# Patient Record
Sex: MALE | Race: WHITE | NOT HISPANIC OR LATINO | Employment: OTHER | URBAN - METROPOLITAN AREA
[De-identification: names, ages, dates, MRNs, and addresses within clinical notes are randomized per-mention and may not be internally consistent; named-entity substitution may affect disease eponyms.]

---

## 2017-01-12 ENCOUNTER — GENERIC CONVERSION - ENCOUNTER (OUTPATIENT)
Dept: OTHER | Facility: OTHER | Age: 27
End: 2017-01-12

## 2017-02-02 ENCOUNTER — GENERIC CONVERSION - ENCOUNTER (OUTPATIENT)
Dept: OTHER | Facility: OTHER | Age: 27
End: 2017-02-02

## 2017-05-02 ENCOUNTER — ALLSCRIPTS OFFICE VISIT (OUTPATIENT)
Dept: OTHER | Facility: OTHER | Age: 27
End: 2017-05-02

## 2017-07-03 ENCOUNTER — ALLSCRIPTS OFFICE VISIT (OUTPATIENT)
Dept: OTHER | Facility: OTHER | Age: 27
End: 2017-07-03

## 2017-10-03 ENCOUNTER — ALLSCRIPTS OFFICE VISIT (OUTPATIENT)
Dept: OTHER | Facility: OTHER | Age: 27
End: 2017-10-03

## 2017-10-04 NOTE — PROGRESS NOTES
Assessment  1  Opioid dependence (304 00) (F11 20)   2  Anxiety (300 00) (F41 9)    Plan  Anxiety    · Follow-up visit in 3 months Evaluation and Treatment  Follow-up  Status: Hold For -  Scheduling  Requested for: 25XHV1581  Anxiety, Insomnia    · ALPRAZolam 2 MG Oral Tablet; TAKE 1 TABLET 3 TIMES DAILY AS NEEDED    Discussion/Summary    Continue methadone taper  Chief Complaint  Patient here today for follow up visit  No complaint of pain  Prescription refill needed  History of Present Illness  Patient comes in for recheck  He is now taking 24 mg of methadone per day  He is decreasing his dose by 3 mg every 2 weeks  He is having more withdrawal symptoms  Review of Systems    Constitutional: feeling poorly-and-feeling tired  Cardiovascular: No complaints of slow heart rate, no fast heart rate, no chest pain, no palpitations, no leg claudication, no lower extremity  Respiratory: No complaints of shortness of breath, no wheezing, no cough, no SOB on exertion, no orthopnea or PND  Gastrointestinal: No complaints of abdominal pain, no constipation, no nausea or vomiting, no diarrhea or bloody stools  Active Problems  1  Anxiety (300 00) (F41 9)   2  Constipation (564 00) (K59 00)   3  Insomnia (780 52) (G47 00)   4  Left-sided chest wall pain (786 52) (R07 89)   5  Need for influenza vaccination (V04 81) (Z23)   6  Need for Tdap vaccination (V06 1) (Z23)   7  Opioid dependence (304 00) (F11 20)   8  Prolonged QT interval (794 31) (R94 31)   9  Rhabdomyolysis (728 88) (M62 82)   10  Tobacco use (305 1) (Z72 0)    Past Medical History    The active problems and past medical history were reviewed and updated today  Family History  Mother    1  Denied: Family history of mental disorder   2  Denied: Family history of substance abuse   3  No pertinent family history  Father    4   No pertinent family history    Social History   · Always uses seat belt   · Current every day smoker (305 1) (F17 200)   · Does not use illicit drugs (J18 57) (Z78 9)   · Exercises regularly   · Full-time employment   · Lives with family   · Single   · Tobacco use (305 1) (Z72 0)    Current Meds   1  ALPRAZolam 2 MG Oral Tablet; TAKE 1 TABLET 3 TIMES DAILY AS NEEDED; Therapy: 29Apr2016 to (Evaluate:01Oct2017); Last Rx:[id number] Ordered   2  Methadone HCl - 10 MG Oral Tablet; 5 daily; Therapy: 41Zcv9716 to (Last Rx:[id number]) Ordered    The medication list was reviewed and updated today  Allergies  1  No Known Drug Allergies    Vitals  Vital Signs    Recorded: 18WQE6438 04:34PM   Heart Rate 72   Systolic 460   Diastolic 86   Height 6 ft    Weight 217 lb 6 oz   BMI Calculated 29 48   BSA Calculated 2 21   O2 Saturation 99     Physical Exam    Constitutional   General appearance: No acute distress, well appearing and well nourished  Eyes   Conjunctiva and lids: No swelling, erythema, or discharge  Pupils and irises: Equal, round and reactive to light  Ears, Nose, Mouth, and Throat   External inspection of ears and nose: Normal     Oropharynx: Normal with no erythema, edema, exudate or lesions  Pulmonary   Respiratory effort: No increased work of breathing or signs of respiratory distress  Auscultation of lungs: Clear to auscultation, equal breath sounds bilaterally, no wheezes, no rales, no rhonci  Cardiovascular   Auscultation of heart: Normal rate and rhythm, normal S1 and S2, without murmurs  Abdomen   Abdomen: Non-tender, no masses  Liver and spleen: No hepatomegaly or splenomegaly  Musculoskeletal   Gait and station: Normal     Digits and nails: Normal without clubbing or cyanosis      Inspection/palpation of joints, bones, and muscles: Normal     Psychiatric   Orientation to person, place and time: Normal     Mood and affect: Normal          Signatures   Electronically signed by : GODFREY Richards ; Oct  3 2017  5:04PM EST                       (Author)

## 2017-10-31 ENCOUNTER — GENERIC CONVERSION - ENCOUNTER (OUTPATIENT)
Dept: OTHER | Facility: OTHER | Age: 27
End: 2017-10-31

## 2017-11-07 ENCOUNTER — GENERIC CONVERSION - ENCOUNTER (OUTPATIENT)
Dept: OTHER | Facility: OTHER | Age: 27
End: 2017-11-07

## 2017-11-30 ENCOUNTER — GENERIC CONVERSION - ENCOUNTER (OUTPATIENT)
Dept: OTHER | Facility: OTHER | Age: 27
End: 2017-11-30

## 2018-01-05 ENCOUNTER — GENERIC CONVERSION - ENCOUNTER (OUTPATIENT)
Dept: OTHER | Facility: OTHER | Age: 28
End: 2018-01-05

## 2018-01-14 VITALS
OXYGEN SATURATION: 98 % | HEART RATE: 59 BPM | WEIGHT: 217 LBS | BODY MASS INDEX: 29.39 KG/M2 | SYSTOLIC BLOOD PRESSURE: 122 MMHG | HEIGHT: 72 IN | DIASTOLIC BLOOD PRESSURE: 68 MMHG

## 2018-01-15 VITALS
HEART RATE: 72 BPM | OXYGEN SATURATION: 99 % | SYSTOLIC BLOOD PRESSURE: 134 MMHG | HEIGHT: 72 IN | WEIGHT: 217.38 LBS | DIASTOLIC BLOOD PRESSURE: 86 MMHG | BODY MASS INDEX: 29.44 KG/M2

## 2018-01-17 NOTE — PROGRESS NOTES
Assessment    1  Encounter for preventive health examination (V70 0) (Z00 00)   2  Opioid dependence (304 00) (F11 20)   3  Prolonged QT interval (794 31) (R94 31)   4  Tobacco use (305 1) (Z72 0)    Plan  Anxiety, Insomnia    · ALPRAZolam 2 MG Oral Tablet; TAKE 1 TABLET 3 TIMES DAILY AS NEEDED  Health Maintenance    · Follow-up visit in 3 months Evaluation and Treatment  Follow-up  Status: Complete   Done: 51UQH7733  Need for influenza vaccination    · Influenza  Opioid dependence    · From  Methadone HCl - 10 MG Oral Tablet 7 daily To Methadone HCl - 10 MG  Oral Tablet 5 daily    Discussion/Summary  Impression: health maintenance visit  Currently, he eats a healthy diet  Prostate cancer screening: PSA is not indicated  Testicular cancer screening: the risks and benefits of testicular cancer screening were discussed  Colorectal cancer screening: colorectal cancer screening is not indicated  The immunizations are up to date  He will continue to follow-up at his methadone clinic  Chief Complaint  Pt  in office today for a check up  History of Present Illness  HM, Adult Male: The patient is being seen for a health maintenance evaluation  Social History: He is unmarried  Work status: working full time  The patient is a current cigarette smoker  He is not ready to quit using tobacco  He reports occasional alcohol use  He Patient on methadone maintenance program    General Health: The patient's health since the last visit is described as good  Lifestyle:  He uses tobacco  He denies drug use  Screening:   HPI: Patient comes in for a recheck  Continues to decrease his methadone  He is now using 50 mg per day  He is decreasing by 5 mg every 2 weeks  Review of Systems    Constitutional: No fever or chills, feels well, no tiredness, no recent weight gain or weight loss     Cardiovascular: No complaints of slow heart rate, no fast heart rate, no chest pain, no palpitations, no leg claudication, no lower extremity  Respiratory: No complaints of shortness of breath, no wheezing, no cough, no SOB on exertion, no orthopnea or PND  Active Problems    1  Anxiety (300 00) (F41 9)   2  Constipation (564 00) (K59 00)   3  Insomnia (780 52) (G47 00)   4  Left-sided chest wall pain (786 52) (R07 89)   5  Need for influenza vaccination (V04 81) (Z23)   6  Need for Tdap vaccination (V06 1) (Z23)   7  Opioid dependence (304 00) (F11 20)   8  Prolonged QT interval (794 31) (R94 31)   9  Rhabdomyolysis (728 88) (M62 82)   10  Tobacco use (305 1) (Z72 0)    Family History  Mother    · Denied: Family history of mental disorder   · Denied: Family history of substance abuse   · No pertinent family history  Father    · No pertinent family history    Social History    · Always uses seat belt   · Current every day smoker (305 1) (F17 200)   · Does not use illicit drugs (M95 90) (Z78 9)   · Exercises regularly   · Full-time employment   · Lives with family   · Single   · Tobacco use (305 1) (Z72 0)    Current Meds   1  ALPRAZolam 2 MG Oral Tablet; TAKE 1 TABLET 3 TIMES DAILY AS NEEDED; Therapy: 98Dmw8025 to (Evaluate:47Xqm7827); Last AW:[id number] Ordered   2  Methadone HCl - 10 MG Oral Tablet; 7 daily; Therapy: 68Pau6673 to (Last Rx:[id number]) Ordered    Allergies    1  No Known Drug Allergies    Vitals   Recorded: 88HJY9204 05:45PM   Heart Rate 57   Systolic 148   Diastolic 68   Height 6 ft    Weight 211 lb    BMI Calculated 28 62   BSA Calculated 2 18   O2 Saturation 97     Physical Exam    Constitutional   General appearance: No acute distress, well appearing and well nourished  Eyes   Conjunctiva and lids: No swelling, erythema, or discharge  Pupils and irises: Equal, round and reactive to light  Ears, Nose, Mouth, and Throat   External inspection of ears and nose: Normal     Oropharynx: Normal with no erythema, edema, exudate or lesions      Pulmonary   Respiratory effort: No increased work of breathing or signs of respiratory distress  Auscultation of lungs: Clear to auscultation  Cardiovascular   Palpation of heart: Normal PMI, no thrills  Auscultation of heart: Normal rate and rhythm, normal S1 and S2, without murmurs  Examination of extremities for edema and/or varicosities: Normal     Abdomen   Abdomen: Non-tender, no masses  Liver and spleen: No hepatomegaly or splenomegaly  Lymphatic   Palpation of lymph nodes in neck: No lymphadenopathy  Musculoskeletal   Gait and station: Normal     Digits and nails: Normal without clubbing or cyanosis  Inspection/palpation of joints, bones, and muscles: Normal     Skin   Skin and subcutaneous tissue: Normal without rashes or lesions  Neurologic   Cranial nerves: Cranial nerves 2-12 intact  Reflexes: 2+ and symmetric  Sensation: No sensory loss      Psychiatric   Orientation to person, place and time: Normal     Mood and affect: Normal        Signatures   Electronically signed by : GODFREY Farris ; Jul  3 2017  6:05PM EST                       (Author)

## 2018-01-22 VITALS
HEIGHT: 72 IN | BODY MASS INDEX: 29.26 KG/M2 | OXYGEN SATURATION: 97 % | WEIGHT: 216 LBS | SYSTOLIC BLOOD PRESSURE: 130 MMHG | HEART RATE: 85 BPM | DIASTOLIC BLOOD PRESSURE: 80 MMHG

## 2018-01-22 VITALS
DIASTOLIC BLOOD PRESSURE: 68 MMHG | OXYGEN SATURATION: 97 % | WEIGHT: 211 LBS | SYSTOLIC BLOOD PRESSURE: 110 MMHG | HEART RATE: 57 BPM | BODY MASS INDEX: 28.58 KG/M2 | HEIGHT: 72 IN

## 2018-01-22 VITALS
HEIGHT: 72 IN | SYSTOLIC BLOOD PRESSURE: 122 MMHG | HEART RATE: 86 BPM | BODY MASS INDEX: 28.71 KG/M2 | DIASTOLIC BLOOD PRESSURE: 80 MMHG | OXYGEN SATURATION: 98 % | WEIGHT: 212 LBS

## 2018-01-22 VITALS
WEIGHT: 213 LBS | SYSTOLIC BLOOD PRESSURE: 130 MMHG | DIASTOLIC BLOOD PRESSURE: 78 MMHG | OXYGEN SATURATION: 97 % | HEART RATE: 93 BPM | BODY MASS INDEX: 28.85 KG/M2 | HEIGHT: 72 IN

## 2018-01-22 VITALS
HEIGHT: 72 IN | SYSTOLIC BLOOD PRESSURE: 132 MMHG | DIASTOLIC BLOOD PRESSURE: 70 MMHG | HEART RATE: 89 BPM | OXYGEN SATURATION: 98 % | WEIGHT: 205 LBS | BODY MASS INDEX: 27.77 KG/M2

## 2018-01-24 VITALS
SYSTOLIC BLOOD PRESSURE: 122 MMHG | OXYGEN SATURATION: 98 % | TEMPERATURE: 100.4 F | WEIGHT: 212.13 LBS | HEIGHT: 72 IN | HEART RATE: 106 BPM | RESPIRATION RATE: 16 BRPM | DIASTOLIC BLOOD PRESSURE: 88 MMHG | BODY MASS INDEX: 28.73 KG/M2

## 2018-02-01 RX ORDER — METHADONE HYDROCHLORIDE 5 MG/1
1 TABLET ORAL DAILY
COMMUNITY
Start: 2015-12-23 | End: 2018-02-02

## 2018-02-01 RX ORDER — HYDROXYZINE 50 MG/1
1-2 TABLET, FILM COATED ORAL AS NEEDED
COMMUNITY
Start: 2017-10-31 | End: 2018-05-03 | Stop reason: SDUPTHER

## 2018-02-01 RX ORDER — ALPRAZOLAM 2 MG/1
2 TABLET ORAL 2 TIMES DAILY
COMMUNITY
Start: 2016-04-29 | End: 2018-02-02 | Stop reason: SDUPTHER

## 2018-02-02 ENCOUNTER — OFFICE VISIT (OUTPATIENT)
Dept: FAMILY MEDICINE CLINIC | Facility: CLINIC | Age: 28
End: 2018-02-02
Payer: COMMERCIAL

## 2018-02-02 VITALS
HEART RATE: 87 BPM | TEMPERATURE: 100.1 F | HEIGHT: 73 IN | OXYGEN SATURATION: 98 % | SYSTOLIC BLOOD PRESSURE: 142 MMHG | DIASTOLIC BLOOD PRESSURE: 84 MMHG | WEIGHT: 209 LBS | RESPIRATION RATE: 17 BRPM | BODY MASS INDEX: 27.7 KG/M2

## 2018-02-02 DIAGNOSIS — F11.23 NARCOTIC WITHDRAWAL (HCC): ICD-10-CM

## 2018-02-02 DIAGNOSIS — F41.9 ANXIETY: Primary | ICD-10-CM

## 2018-02-02 PROBLEM — F11.93 NARCOTIC WITHDRAWAL (HCC): Status: ACTIVE | Noted: 2018-02-02

## 2018-02-02 PROCEDURE — 99213 OFFICE O/P EST LOW 20 MIN: CPT | Performed by: FAMILY MEDICINE

## 2018-02-02 RX ORDER — ALPRAZOLAM 2 MG/1
2 TABLET ORAL 3 TIMES DAILY
Qty: 180 TABLET | Refills: 0 | Status: SHIPPED | OUTPATIENT
Start: 2018-02-02 | End: 2018-02-02 | Stop reason: SDUPTHER

## 2018-02-02 RX ORDER — ALPRAZOLAM 2 MG/1
4 TABLET ORAL 3 TIMES DAILY
Qty: 180 TABLET | Refills: 0 | Status: SHIPPED | OUTPATIENT
Start: 2018-02-02 | End: 2018-03-05 | Stop reason: SDUPTHER

## 2018-02-02 NOTE — PATIENT INSTRUCTIONS
Once his withdrawal symptoms have improved he will attempt to wean off Xanax  He will call for refills until his follow-up appointment

## 2018-02-02 NOTE — PROGRESS NOTES
Assessment/Plan:           Problem List Items Addressed This Visit     Anxiety - Primary    Relevant Medications    ALPRAZolam (XANAX) 2 MG tablet    Narcotic withdrawal (HCC)    Relevant Medications    hydrOXYzine HCL (ATARAX) 50 mg tablet    ALPRAZolam Forestine Guitar) 2 MG tablet            Subjective:      Patient ID: Manuela Avila is a 24 y o  male  Patient comes in for recheck  He took his last dose of methadone yesterday and is in full-blown withdrawal with anxiety, jitteriness, fever and chills  The following portions of the patient's history were reviewed and updated as appropriate: allergies, current medications, past family history, past medical history, past social history, past surgical history and problem list     Review of Systems   Constitutional: Positive for chills, diaphoresis and fatigue  HENT: Negative  Respiratory: Negative  Cardiovascular: Negative  Objective:     Physical Exam   Constitutional: He is oriented to person, place, and time  He appears well-developed and well-nourished  HENT:   Head: Normocephalic and atraumatic  Right Ear: Tympanic membrane normal    Left Ear: Tympanic membrane normal    Eyes: EOM are normal  Pupils are equal, round, and reactive to light  Neck: Neck supple  Cardiovascular: Normal rate, regular rhythm and normal heart sounds  Pulmonary/Chest: Effort normal and breath sounds normal    Abdominal: Soft  Bowel sounds are normal    Musculoskeletal: Normal range of motion  Neurological: He is alert and oriented to person, place, and time  Skin: Skin is warm and dry  Psychiatric: He has a normal mood and affect  Thought content normal    Vitals reviewed

## 2018-03-05 ENCOUNTER — OFFICE VISIT (OUTPATIENT)
Dept: FAMILY MEDICINE CLINIC | Facility: CLINIC | Age: 28
End: 2018-03-05
Payer: COMMERCIAL

## 2018-03-05 VITALS
HEIGHT: 73 IN | TEMPERATURE: 98.6 F | SYSTOLIC BLOOD PRESSURE: 124 MMHG | WEIGHT: 197 LBS | DIASTOLIC BLOOD PRESSURE: 82 MMHG | BODY MASS INDEX: 26.11 KG/M2 | HEART RATE: 110 BPM | OXYGEN SATURATION: 99 %

## 2018-03-05 DIAGNOSIS — F41.9 ANXIETY: Primary | ICD-10-CM

## 2018-03-05 DIAGNOSIS — G47.00 INSOMNIA, UNSPECIFIED TYPE: ICD-10-CM

## 2018-03-05 PROCEDURE — 99213 OFFICE O/P EST LOW 20 MIN: CPT | Performed by: FAMILY MEDICINE

## 2018-03-05 RX ORDER — ALPRAZOLAM 2 MG/1
4 TABLET ORAL 3 TIMES DAILY
Qty: 180 TABLET | Refills: 0 | Status: SHIPPED | OUTPATIENT
Start: 2018-03-05 | End: 2018-04-03 | Stop reason: SDUPTHER

## 2018-03-05 NOTE — PROGRESS NOTES
Assessment/Plan:           Problem List Items Addressed This Visit     None            Subjective:      Patient ID: Omega Neil is a 24 y o  male  Patient comes in for recheck  He still having problems with anxiety and insomnia despite the amount of medication he is taking        The following portions of the patient's history were reviewed and updated as appropriate: allergies, current medications, past family history, past medical history, past social history, past surgical history and problem list     Review of Systems   HENT: Negative  Respiratory: Negative  Cardiovascular: Negative  Gastrointestinal: Negative  Objective:      /82   Pulse (!) 110   Temp 98 6 °F (37 °C)   Ht 6' 1" (1 854 m)   Wt 89 4 kg (197 lb)   SpO2 99%   BMI 25 99 kg/m²          Physical Exam   Skin: Skin is warm and dry  Psychiatric: He has a normal mood and affect   His behavior is normal  Thought content normal

## 2018-04-03 ENCOUNTER — OFFICE VISIT (OUTPATIENT)
Dept: FAMILY MEDICINE CLINIC | Facility: CLINIC | Age: 28
End: 2018-04-03
Payer: COMMERCIAL

## 2018-04-03 VITALS
DIASTOLIC BLOOD PRESSURE: 90 MMHG | WEIGHT: 184.4 LBS | HEIGHT: 73 IN | HEART RATE: 88 BPM | OXYGEN SATURATION: 98 % | BODY MASS INDEX: 24.44 KG/M2 | TEMPERATURE: 98.4 F | SYSTOLIC BLOOD PRESSURE: 130 MMHG

## 2018-04-03 DIAGNOSIS — F41.9 ANXIETY: Primary | ICD-10-CM

## 2018-04-03 DIAGNOSIS — G47.00 INSOMNIA, UNSPECIFIED TYPE: ICD-10-CM

## 2018-04-03 PROCEDURE — 99213 OFFICE O/P EST LOW 20 MIN: CPT | Performed by: FAMILY MEDICINE

## 2018-04-03 RX ORDER — ALPRAZOLAM 2 MG/1
4 TABLET ORAL 3 TIMES DAILY
Qty: 180 TABLET | Refills: 0 | Status: SHIPPED | OUTPATIENT
Start: 2018-04-03 | End: 2018-05-03 | Stop reason: SDUPTHER

## 2018-04-03 NOTE — PROGRESS NOTES
Assessment/Plan:           Problem List Items Addressed This Visit     Anxiety - Primary    Relevant Medications    ALPRAZolam (XANAX) 2 MG tablet    Insomnia            Subjective:      Patient ID: Santo Feliz is a 34 y o  male  Patient comes in for a recheck  He continues to require the same amount of Xanax  He is able to work 4-5 hours per day  But continues to have problems sleeping  He is in counseling once per week  Medication Refill         The following portions of the patient's history were reviewed and updated as appropriate: allergies, current medications, past family history, past medical history, past social history, past surgical history and problem list     Review of Systems   Constitutional: Negative  HENT: Negative  Respiratory: Negative  Cardiovascular: Negative  Objective:      /90   Pulse 88   Temp 98 4 °F (36 9 °C)   Ht 6' 1" (1 854 m)   Wt 83 6 kg (184 lb 6 4 oz)   SpO2 98%   BMI 24 33 kg/m²          Physical Exam   Psychiatric: He has a normal mood and affect   His behavior is normal  Judgment and thought content normal

## 2018-04-03 NOTE — PROGRESS NOTES
Assessment/Plan:           Problem List Items Addressed This Visit     Anxiety - Primary    Insomnia            Subjective:      Patient ID: Wagner Simons is a 34 y o  male      HPI    The following portions of the patient's history were reviewed and updated as appropriate: allergies, current medications, past family history, past medical history, past social history, past surgical history and problem list     Review of Systems      Objective:      /90   Pulse 88   Temp 98 4 °F (36 9 °C)   Ht 6' 1" (1 854 m)   Wt 83 6 kg (184 lb 6 4 oz)   SpO2 98%   BMI 24 33 kg/m²          Physical Exam

## 2018-04-03 NOTE — PATIENT INSTRUCTIONS
We discussed his weaning of Xanax and using hydroxyzine to help him sleep or further anxiety problems  He agrees with this plan but does not feel ready to do this at this point

## 2018-04-09 DIAGNOSIS — F41.9 ANXIETY: ICD-10-CM

## 2018-04-10 RX ORDER — ALPRAZOLAM 2 MG/1
TABLET ORAL
Qty: 180 TABLET | Refills: 0 | OUTPATIENT
Start: 2018-04-10

## 2018-05-03 ENCOUNTER — OFFICE VISIT (OUTPATIENT)
Dept: FAMILY MEDICINE CLINIC | Facility: CLINIC | Age: 28
End: 2018-05-03
Payer: COMMERCIAL

## 2018-05-03 ENCOUNTER — TELEPHONE (OUTPATIENT)
Dept: FAMILY MEDICINE CLINIC | Facility: CLINIC | Age: 28
End: 2018-05-03

## 2018-05-03 VITALS
HEIGHT: 73 IN | HEART RATE: 96 BPM | TEMPERATURE: 99.1 F | OXYGEN SATURATION: 99 % | WEIGHT: 177 LBS | DIASTOLIC BLOOD PRESSURE: 72 MMHG | BODY MASS INDEX: 23.46 KG/M2 | SYSTOLIC BLOOD PRESSURE: 126 MMHG

## 2018-05-03 DIAGNOSIS — R60.9 EDEMA, UNSPECIFIED TYPE: ICD-10-CM

## 2018-05-03 DIAGNOSIS — F11.29 OPIOID DEPENDENCE WITH OPIOID-INDUCED DISORDER (HCC): ICD-10-CM

## 2018-05-03 DIAGNOSIS — G47.00 INSOMNIA, UNSPECIFIED TYPE: Primary | ICD-10-CM

## 2018-05-03 DIAGNOSIS — R63.4 WEIGHT LOSS: ICD-10-CM

## 2018-05-03 DIAGNOSIS — M25.50 ARTHRALGIA, UNSPECIFIED JOINT: ICD-10-CM

## 2018-05-03 DIAGNOSIS — F41.9 ANXIETY: ICD-10-CM

## 2018-05-03 PROBLEM — F11.93 NARCOTIC WITHDRAWAL (HCC): Status: RESOLVED | Noted: 2018-02-02 | Resolved: 2018-05-03

## 2018-05-03 PROBLEM — F11.23 NARCOTIC WITHDRAWAL (HCC): Status: RESOLVED | Noted: 2018-02-02 | Resolved: 2018-05-03

## 2018-05-03 PROCEDURE — 99214 OFFICE O/P EST MOD 30 MIN: CPT | Performed by: FAMILY MEDICINE

## 2018-05-03 RX ORDER — HYDROXYZINE 50 MG/1
50-100 TABLET, FILM COATED ORAL EVERY 4 HOURS PRN
Qty: 60 TABLET | Refills: 3 | Status: SHIPPED | OUTPATIENT
Start: 2018-05-03 | End: 2018-09-04 | Stop reason: SDUPTHER

## 2018-05-03 RX ORDER — ALPRAZOLAM 2 MG/1
4 TABLET ORAL 3 TIMES DAILY
Qty: 180 TABLET | Refills: 0 | Status: SHIPPED | OUTPATIENT
Start: 2018-05-03 | End: 2018-06-04 | Stop reason: SDUPTHER

## 2018-05-03 NOTE — PROGRESS NOTES
Assessment/Plan:           Problem List Items Addressed This Visit     Anxiety    Relevant Medications    ALPRAZolam (XANAX) 2 MG tablet    hydrOXYzine HCL (ATARAX) 50 mg tablet    Insomnia - Primary    Relevant Medications    hydrOXYzine HCL (ATARAX) 50 mg tablet    Opioid dependence (HCC)    Edema    Relevant Orders    CBC and differential    Comprehensive metabolic panel    Urinalysis with reflex to microscopic    Arthralgia    Relevant Orders    MISAEL Screen w/ Reflex to Titer/Pattern    Lyme Antibody Profile with reflex to WB    Uric acid    Sedimentation rate, automated    RF Screen w/ Reflex to Titer    TSH, 3rd generation with T4 reflex    Myoglobin, urine    Urinalysis with reflex to microscopic    Weight loss            Subjective:      Patient ID: Curtistine Fleischer is a 34 y o  male  Patient comes in for recheck  He is still having great deal problems with anxiety and insomnia  He is now noting joint aches and pains especially in his ankles and edema of his feet  He has resume working full-time  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  The following portions of the patient's history were reviewed and updated as appropriate: allergies, current medications, past family history, past medical history, past social history, past surgical history and problem list     Review of Systems   Constitutional: Positive for diaphoresis and fatigue  HENT: Negative  Respiratory: Negative  Cardiovascular: Negative  Gastrointestinal: Negative  Objective:      /72   Pulse 96   Temp 99 1 °F (37 3 °C)   Ht 6' 1" (1 854 m)   Wt 80 3 kg (177 lb)   SpO2 99%   BMI 23 35 kg/m²          Physical Exam   Constitutional: He is oriented to person, place, and time  He appears well-developed and well-nourished  HENT:   Head: Normocephalic and atraumatic     Right Ear: Tympanic membrane normal    Left Ear: Tympanic membrane normal    Eyes: EOM are normal  Pupils are equal, round, and reactive to light  Neck: Neck supple  Cardiovascular: Normal rate, regular rhythm and normal heart sounds  Pulmonary/Chest: Effort normal and breath sounds normal    Abdominal: Soft  Bowel sounds are normal    Musculoskeletal: Normal range of motion  Neurological: He is alert and oriented to person, place, and time  Skin: Skin is warm and dry  Psychiatric: He has a normal mood and affect   Thought content normal

## 2018-05-03 NOTE — TELEPHONE ENCOUNTER
Pharmacy called and the Alprazolam isn't covered at 6 a day - spoke with BD and he said that the patient is aware of that and he pays out of pocket

## 2018-06-04 ENCOUNTER — OFFICE VISIT (OUTPATIENT)
Dept: FAMILY MEDICINE CLINIC | Facility: CLINIC | Age: 28
End: 2018-06-04
Payer: COMMERCIAL

## 2018-06-04 VITALS
TEMPERATURE: 98.4 F | OXYGEN SATURATION: 98 % | WEIGHT: 172 LBS | HEART RATE: 82 BPM | DIASTOLIC BLOOD PRESSURE: 70 MMHG | HEIGHT: 73 IN | BODY MASS INDEX: 22.8 KG/M2 | SYSTOLIC BLOOD PRESSURE: 104 MMHG

## 2018-06-04 DIAGNOSIS — G47.00 INSOMNIA, UNSPECIFIED TYPE: ICD-10-CM

## 2018-06-04 DIAGNOSIS — R63.4 WEIGHT LOSS: ICD-10-CM

## 2018-06-04 DIAGNOSIS — M77.51 TENDINITIS OF RIGHT ANKLE: ICD-10-CM

## 2018-06-04 DIAGNOSIS — F41.9 ANXIETY: Primary | ICD-10-CM

## 2018-06-04 PROCEDURE — 99214 OFFICE O/P EST MOD 30 MIN: CPT | Performed by: FAMILY MEDICINE

## 2018-06-04 RX ORDER — ALPRAZOLAM 2 MG/1
4 TABLET ORAL 3 TIMES DAILY
Qty: 180 TABLET | Refills: 0 | Status: SHIPPED | OUTPATIENT
Start: 2018-06-04 | End: 2018-07-03 | Stop reason: SDUPTHER

## 2018-06-04 NOTE — PROGRESS NOTES
Assessment/Plan:           Problem List Items Addressed This Visit     Anxiety - Primary     He will attempt to wean back on his Xanax  Relevant Medications    ALPRAZolam (XANAX) 2 MG tablet    Insomnia    Weight loss     Although his recent blood work is normal he has a TSH that is on the low side  I recommend a full thyroid profile for his weight loss and possibly CT of chest abdomen pelvis  Tendinitis of right ankle     Air cast is recommended for time being  He has appointment with Orthopedics in 3 days  Subjective:      Patient ID: Francine Ferreira is a 34 y o  male  Patient comes in for recheck  He still is having problems with anxiety and insomnia  He continues to lose weight  Complains of right ankle pain  The following portions of the patient's history were reviewed and updated as appropriate: allergies, current medications, past family history, past medical history, past social history, past surgical history and problem list     Review of Systems   Constitutional: Negative  HENT: Negative  Respiratory: Negative  Cardiovascular: Negative  Objective:      /70   Pulse 82   Temp 98 4 °F (36 9 °C)   Ht 6' 1" (1 854 m)   Wt 78 kg (172 lb)   SpO2 98%   BMI 22 69 kg/m²          Physical Exam   Constitutional: He is oriented to person, place, and time  He appears well-developed and well-nourished  HENT:   Head: Normocephalic and atraumatic  Right Ear: Tympanic membrane normal    Left Ear: Tympanic membrane normal    Eyes: EOM are normal  Pupils are equal, round, and reactive to light  Neck: Neck supple  Cardiovascular: Normal rate, regular rhythm and normal heart sounds  Pulmonary/Chest: Effort normal and breath sounds normal    Abdominal: Soft  Bowel sounds are normal    Musculoskeletal: Normal range of motion  Tender anterior posterior right ankle  Pain with flexion and extension of foot     Neurological: He is alert and oriented to person, place, and time  Skin: Skin is warm and dry  Psychiatric: He has a normal mood and affect   Thought content normal

## 2018-06-05 ENCOUNTER — TELEPHONE (OUTPATIENT)
Dept: FAMILY MEDICINE CLINIC | Facility: CLINIC | Age: 28
End: 2018-06-05

## 2018-06-05 NOTE — TELEPHONE ENCOUNTER
Xanax 2 mg 2 tab tid   Insurance allows 5 daily without prior auth  Call for prior auth if needed @ [phone]  ID# [id number]    Decrease amount or Prior Auth?     Call pharmacy to inform

## 2018-06-22 ENCOUNTER — TELEPHONE (OUTPATIENT)
Dept: FAMILY MEDICINE CLINIC | Facility: CLINIC | Age: 28
End: 2018-06-22

## 2018-07-03 ENCOUNTER — OFFICE VISIT (OUTPATIENT)
Dept: FAMILY MEDICINE CLINIC | Facility: CLINIC | Age: 28
End: 2018-07-03
Payer: COMMERCIAL

## 2018-07-03 VITALS
BODY MASS INDEX: 23.72 KG/M2 | DIASTOLIC BLOOD PRESSURE: 64 MMHG | OXYGEN SATURATION: 98 % | WEIGHT: 179 LBS | TEMPERATURE: 98.1 F | SYSTOLIC BLOOD PRESSURE: 102 MMHG | HEART RATE: 100 BPM | HEIGHT: 73 IN

## 2018-07-03 DIAGNOSIS — G71.11 MYOTONIC DYSTROPHY (HCC): ICD-10-CM

## 2018-07-03 DIAGNOSIS — M79.671 RIGHT FOOT PAIN: Primary | ICD-10-CM

## 2018-07-03 DIAGNOSIS — F41.9 ANXIETY: ICD-10-CM

## 2018-07-03 PROCEDURE — 99214 OFFICE O/P EST MOD 30 MIN: CPT | Performed by: FAMILY MEDICINE

## 2018-07-03 RX ORDER — ALPRAZOLAM 2 MG/1
4 TABLET ORAL 3 TIMES DAILY
Qty: 180 TABLET | Refills: 0 | Status: SHIPPED | OUTPATIENT
Start: 2018-07-03 | End: 2018-08-06 | Stop reason: SDUPTHER

## 2018-07-03 NOTE — TELEPHONE ENCOUNTER
Rejection from pharmacy not received via fax with all the pertinent information  T/c to patient, l/m stating I need insurance info if prior Michele Cid is required for this rx or for further refills

## 2018-07-03 NOTE — PROGRESS NOTES
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[address] )     Continue follow-up with Neurology  Subjective:      Patient ID: April Harding is a 34 y o  male  Patient comes in for a recheck  He is now seeing Neurology at [facility] for his right foot pain  He has been diagnosed with a myotonic dystrophy  He continues aches Xanax 4 mg 3 times a day for his anxiety  He is hydroxyzine to help him sleep at night  He had a full cardiology workup while at [facility]  The following portions of the patient's history were reviewed and updated as appropriate: allergies, current medications, past family history, past medical history, past social history, past surgical history and problem list     Review of Systems   Constitutional: Negative  HENT: Negative  Respiratory: Negative  Cardiovascular: Negative  Objective:      /64   Pulse 100   Temp 98 1 °F (36 7 °C)   Ht 6' 1" (1 854 m)   Wt 81 2 kg (179 lb)   SpO2 98%   BMI 23 62 kg/m²          Physical Exam   Constitutional: He is oriented to person, place, and time  He appears well-developed and well-nourished  HENT:   Head: Normocephalic and atraumatic  Right Ear: Tympanic membrane normal    Left Ear: Tympanic membrane normal    Eyes: EOM are normal  Pupils are equal, round, and reactive to light  Neck: Neck supple  Cardiovascular: Normal rate, regular rhythm and normal heart sounds  Pulmonary/Chest: Effort normal and breath sounds normal    Abdominal: Soft   Bowel sounds are normal    Musculoskeletal:   Right foot and ankle are in a splint   Neurological: He is alert and oriented to person, place, and time    Skin: Skin is warm and dry  Psychiatric: He has a normal mood and affect   Thought content normal

## 2018-08-06 ENCOUNTER — TELEPHONE (OUTPATIENT)
Dept: FAMILY MEDICINE CLINIC | Facility: CLINIC | Age: 28
End: 2018-08-06

## 2018-08-06 DIAGNOSIS — F41.9 ANXIETY: ICD-10-CM

## 2018-08-06 RX ORDER — ALPRAZOLAM 2 MG/1
4 TABLET ORAL 3 TIMES DAILY
Qty: 180 TABLET | Refills: 0 | Status: SHIPPED | OUTPATIENT
Start: 2018-08-06 | End: 2018-09-04 | Stop reason: SDUPTHER

## 2018-08-07 NOTE — TELEPHONE ENCOUNTER
T/c to Pharmacy  Prior Shy Gene is required at dose of 6 qd (2 tab TID)  Pharmacist checked with patient and he stated he definitely needs the 6 a day   [facility]: [phone]  ID [id number]    T/c to Yanet Jaimes     Case reference # [id number]    Determination will be received via fax within 72 hours

## 2018-08-08 ENCOUNTER — TRANSCRIBE ORDERS (OUTPATIENT)
Dept: ADMINISTRATIVE | Facility: HOSPITAL | Age: 28
End: 2018-08-08

## 2018-08-08 DIAGNOSIS — G90.521 COMPLEX REGIONAL PAIN SYNDROME TYPE 1 OF RIGHT LOWER EXTREMITY: Primary | ICD-10-CM

## 2018-08-08 DIAGNOSIS — G89.29 CHRONIC PAIN OF RIGHT ANKLE: ICD-10-CM

## 2018-08-08 DIAGNOSIS — M25.571 CHRONIC PAIN OF RIGHT ANKLE: ICD-10-CM

## 2018-08-17 ENCOUNTER — HOSPITAL ENCOUNTER (OUTPATIENT)
Dept: NUCLEAR MEDICINE | Facility: HOSPITAL | Age: 28
Discharge: HOME | End: 2018-08-17
Payer: COMMERCIAL

## 2018-08-17 DIAGNOSIS — M25.571 CHRONIC PAIN OF RIGHT ANKLE: ICD-10-CM

## 2018-08-17 DIAGNOSIS — G90.521 COMPLEX REGIONAL PAIN SYNDROME TYPE 1 OF RIGHT LOWER EXTREMITY: ICD-10-CM

## 2018-08-17 DIAGNOSIS — G89.29 CHRONIC PAIN OF RIGHT ANKLE: ICD-10-CM

## 2018-08-17 PROCEDURE — 78315 BONE IMAGING 3 PHASE: CPT

## 2018-08-17 PROCEDURE — A9503 TC99M MEDRONATE: HCPCS

## 2018-09-04 ENCOUNTER — OFFICE VISIT (OUTPATIENT)
Dept: FAMILY MEDICINE CLINIC | Facility: CLINIC | Age: 28
End: 2018-09-04
Payer: COMMERCIAL

## 2018-09-04 VITALS
DIASTOLIC BLOOD PRESSURE: 78 MMHG | HEART RATE: 78 BPM | WEIGHT: 176 LBS | HEIGHT: 73 IN | OXYGEN SATURATION: 99 % | TEMPERATURE: 97.8 F | SYSTOLIC BLOOD PRESSURE: 122 MMHG | BODY MASS INDEX: 23.33 KG/M2

## 2018-09-04 DIAGNOSIS — G90.521 COMPLEX REGIONAL PAIN SYNDROME TYPE 1 OF RIGHT LOWER EXTREMITY: Primary | ICD-10-CM

## 2018-09-04 DIAGNOSIS — F41.9 ANXIETY: ICD-10-CM

## 2018-09-04 DIAGNOSIS — G47.00 INSOMNIA, UNSPECIFIED TYPE: ICD-10-CM

## 2018-09-04 DIAGNOSIS — G71.11 MYOTONIC DYSTROPHY (HCC): ICD-10-CM

## 2018-09-04 PROCEDURE — 3008F BODY MASS INDEX DOCD: CPT | Performed by: FAMILY MEDICINE

## 2018-09-04 PROCEDURE — 99214 OFFICE O/P EST MOD 30 MIN: CPT | Performed by: FAMILY MEDICINE

## 2018-09-04 RX ORDER — ALPRAZOLAM 2 MG/1
4 TABLET ORAL 3 TIMES DAILY
Qty: 180 TABLET | Refills: 0 | Status: SHIPPED | OUTPATIENT
Start: 2018-09-04 | End: 2018-10-09 | Stop reason: SDUPTHER

## 2018-09-04 RX ORDER — GABAPENTIN 300 MG/1
CAPSULE ORAL
COMMUNITY
Start: 2018-08-27 | End: 2018-12-04

## 2018-09-04 RX ORDER — HYDROXYZINE 50 MG/1
50-100 TABLET, FILM COATED ORAL EVERY 4 HOURS PRN
Qty: 60 TABLET | Refills: 5 | Status: SHIPPED | OUTPATIENT
Start: 2018-09-04 | End: 2018-10-09 | Stop reason: SDUPTHER

## 2018-09-04 NOTE — PROGRESS NOTES
Assessment/Plan:      Return visit in 4 months     Problem List Items Addressed This Visit     Anxiety    Relevant Medications    ALPRAZolam (XANAX) 2 MG tablet    hydrOXYzine HCL (ATARAX) 50 mg tablet    Insomnia    Relevant Medications    hydrOXYzine HCL (ATARAX) 50 mg tablet    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[address] )     Follow-up with          Complex regional pain syndrome type 1 of right lower extremity - Primary     Follow-up with pain management         Relevant Orders    CBC and differential    Comprehensive metabolic panel            Subjective:      Patient ID: Shannan Stratton is a 34 y o  male  Patient comes in for a checkup  He continues high dose Xanax and continues to have anxiety type symptoms  He has now been diagnosed with complex regional pain syndrome of his right leg  He is seeing Podiatry and another doctor for management of his pain  He is also having difficulty sleeping  He also is seeing a  for myotonic dystonia of which he has no symptoms  The following portions of the patient's history were reviewed and updated as appropriate: allergies, current medications, past family history, past medical history, past social history, past surgical history and problem list     Review of Systems   Constitutional: Positive for diaphoresis  HENT: Negative  Respiratory: Negative  Cardiovascular: Negative  Objective:      /78   Pulse 78   Temp 97 8 °F (36 6 °C)   Ht 6' 1" (1 854 m)   Wt 79 8 kg (176 lb)   SpO2 99%   BMI 23 22 kg/m²          Physical Exam   Constitutional: He is oriented to person, place, and time  He appears well-developed and well-nourished  HENT:   Head: Normocephalic and atraumatic  Right Ear: Tympanic membrane normal    Left Ear: Tympanic membrane normal    Eyes: EOM are normal  Pupils are equal, round, and reactive to light  Neck: Neck supple  Cardiovascular: Normal rate, regular rhythm and normal heart sounds  Pulmonary/Chest: Effort normal and breath sounds normal    Abdominal: Soft  Bowel sounds are normal    Musculoskeletal:   Ambulates with crutches and splint on right ankle   Neurological: He is alert and oriented to person, place, and time  Skin: Skin is warm and dry  Psychiatric: He has a normal mood and affect   Thought content normal

## 2018-10-09 ENCOUNTER — TELEPHONE (OUTPATIENT)
Dept: FAMILY MEDICINE CLINIC | Facility: CLINIC | Age: 28
End: 2018-10-09

## 2018-10-09 DIAGNOSIS — G47.00 INSOMNIA, UNSPECIFIED TYPE: ICD-10-CM

## 2018-10-09 DIAGNOSIS — F41.9 ANXIETY: ICD-10-CM

## 2018-10-09 RX ORDER — ALPRAZOLAM 2 MG/1
4 TABLET ORAL 3 TIMES DAILY
Qty: 180 TABLET | Refills: 0 | Status: SHIPPED | OUTPATIENT
Start: 2018-10-09 | End: 2018-10-10 | Stop reason: SDUPTHER

## 2018-10-09 RX ORDER — HYDROXYZINE 50 MG/1
50-100 TABLET, FILM COATED ORAL EVERY 4 HOURS PRN
Qty: 60 TABLET | Refills: 0 | Status: SHIPPED | OUTPATIENT
Start: 2018-10-09 | End: 2018-11-08 | Stop reason: SDUPTHER

## 2018-10-09 NOTE — TELEPHONE ENCOUNTER
Pt called needs refill for alprazolam and his insurance will only pay for 5-pills per day at 10mg and he also needs refill for hydroxyzine  Site check-ok  [phone]      Pt called and for his alprazolam medication his insurance will only allow him to have 150-pills      Pt called again that he still have not received his alprazolam, the prescription needs to change to 150-pills

## 2018-10-10 RX ORDER — ALPRAZOLAM 2 MG/1
4 TABLET ORAL 3 TIMES DAILY
Qty: 150 TABLET | Refills: 0 | Status: SHIPPED | OUTPATIENT
Start: 2018-10-10 | End: 2018-11-08 | Stop reason: SDUPTHER

## 2018-11-08 DIAGNOSIS — F41.9 ANXIETY: ICD-10-CM

## 2018-11-08 DIAGNOSIS — G47.00 INSOMNIA, UNSPECIFIED TYPE: ICD-10-CM

## 2018-11-08 RX ORDER — HYDROXYZINE 50 MG/1
50-100 TABLET, FILM COATED ORAL EVERY 4 HOURS PRN
Qty: 60 TABLET | Refills: 5 | Status: SHIPPED | OUTPATIENT
Start: 2018-11-08 | End: 2019-01-08

## 2018-11-08 RX ORDER — ALPRAZOLAM 2 MG/1
4 TABLET ORAL 3 TIMES DAILY
Qty: 150 TABLET | Refills: 0 | Status: SHIPPED | OUTPATIENT
Start: 2018-11-08 | End: 2018-12-04 | Stop reason: SDUPTHER

## 2018-12-04 ENCOUNTER — OFFICE VISIT (OUTPATIENT)
Dept: FAMILY MEDICINE CLINIC | Facility: CLINIC | Age: 28
End: 2018-12-04
Payer: COMMERCIAL

## 2018-12-04 VITALS
SYSTOLIC BLOOD PRESSURE: 130 MMHG | WEIGHT: 180 LBS | DIASTOLIC BLOOD PRESSURE: 68 MMHG | BODY MASS INDEX: 23.86 KG/M2 | HEIGHT: 73 IN | HEART RATE: 102 BPM | OXYGEN SATURATION: 98 % | TEMPERATURE: 98.1 F

## 2018-12-04 DIAGNOSIS — G90.521 COMPLEX REGIONAL PAIN SYNDROME TYPE 1 OF RIGHT LOWER EXTREMITY: Primary | ICD-10-CM

## 2018-12-04 DIAGNOSIS — F41.9 ANXIETY: ICD-10-CM

## 2018-12-04 PROCEDURE — 99213 OFFICE O/P EST LOW 20 MIN: CPT | Performed by: FAMILY MEDICINE

## 2018-12-04 PROCEDURE — 3008F BODY MASS INDEX DOCD: CPT | Performed by: FAMILY MEDICINE

## 2018-12-04 RX ORDER — ALPRAZOLAM 2 MG/1
TABLET ORAL
Qty: 150 TABLET | Refills: 3 | Status: SHIPPED | OUTPATIENT
Start: 2018-12-04 | End: 2019-04-04 | Stop reason: SDUPTHER

## 2018-12-04 NOTE — PROGRESS NOTES
Assessment/Plan:    Return visit in 4 months       Problem List Items Addressed This Visit     Anxiety    Relevant Medications    ALPRAZolam (XANAX) 2 MG tablet    Complex regional pain syndrome type 1 of right lower extremity - Primary     Continue follow-up with pain management                 Subjective:      Patient ID: Surjit Spence is a 34 y o  male  Patient comes in for recheck  He has cut down his Xanax to 5 per day  He continues to have severe pain in his right ankle and foot  He has been diagnosed with RS D  there are plans to have spinal nerve stimulator implanted to decrease his pain  The following portions of the patient's history were reviewed and updated as appropriate: allergies, current medications, past family history, past medical history, past social history, past surgical history and problem list     Review of Systems   Constitutional: Negative  HENT: Negative  Cardiovascular: Negative  Gastrointestinal: Negative  Objective:      /68   Pulse 102   Temp 98 1 °F (36 7 °C)   Ht 6' 1" (1 854 m)   Wt 81 6 kg (180 lb)   SpO2 98%   BMI 23 75 kg/m²          Physical Exam   Constitutional: He is oriented to person, place, and time  He appears well-developed and well-nourished  HENT:   Head: Normocephalic and atraumatic  Right Ear: Tympanic membrane normal    Left Ear: Tympanic membrane normal    Eyes: Pupils are equal, round, and reactive to light  EOM are normal    Neck: Neck supple  Cardiovascular: Normal rate, regular rhythm and normal heart sounds  Pulmonary/Chest: Effort normal and breath sounds normal    Abdominal: Soft  Bowel sounds are normal    Musculoskeletal: Normal range of motion  Neurological: He is alert and oriented to person, place, and time  Skin: Skin is warm and dry  Psychiatric: He has a normal mood and affect   Thought content normal

## 2019-01-08 ENCOUNTER — TELEPHONE (OUTPATIENT)
Dept: FAMILY MEDICINE CLINIC | Facility: CLINIC | Age: 29
End: 2019-01-08

## 2019-01-08 DIAGNOSIS — F41.9 ANXIETY: Primary | ICD-10-CM

## 2019-01-08 RX ORDER — HYDROXYZINE PAMOATE 50 MG/1
CAPSULE ORAL
Qty: 60 CAPSULE | Refills: 5 | Status: SHIPPED | OUTPATIENT
Start: 2019-01-08 | End: 2019-07-02 | Stop reason: SDUPTHER

## 2019-01-08 NOTE — TELEPHONE ENCOUNTER
Fax received from [facility] pharmacy  Hydroxyzine HCL tablets is not cover under pt's insurance plan  They suggested Hydroxyzine MARY ANN 50 mg capsules instead  Please send new Rx to the [facility] pharmacy  Thank you

## 2019-01-21 ENCOUNTER — TRANSCRIBE ORDERS (OUTPATIENT)
Dept: NEUROSURGERY | Facility: CLINIC | Age: 29
End: 2019-01-21

## 2019-01-21 DIAGNOSIS — G89.4 CHRONIC PAIN SYNDROME: Primary | ICD-10-CM

## 2019-03-04 ENCOUNTER — TELEPHONE (OUTPATIENT)
Dept: PAIN MEDICINE | Facility: CLINIC | Age: 29
End: 2019-03-04

## 2019-03-04 ENCOUNTER — CONSULT (OUTPATIENT)
Dept: PAIN MEDICINE | Facility: CLINIC | Age: 29
End: 2019-03-04
Payer: COMMERCIAL

## 2019-03-04 VITALS
DIASTOLIC BLOOD PRESSURE: 92 MMHG | SYSTOLIC BLOOD PRESSURE: 134 MMHG | HEART RATE: 74 BPM | TEMPERATURE: 98.8 F | BODY MASS INDEX: 23.22 KG/M2 | WEIGHT: 176 LBS

## 2019-03-04 DIAGNOSIS — G90.521 COMPLEX REGIONAL PAIN SYNDROME TYPE 1 OF RIGHT LOWER EXTREMITY: Primary | ICD-10-CM

## 2019-03-04 PROBLEM — I73.00 RAYNAUD PHENOMENON: Status: ACTIVE | Noted: 2017-11-30

## 2019-03-04 PROBLEM — R94.31 PROLONGED QT INTERVAL: Status: ACTIVE | Noted: 2017-02-02

## 2019-03-04 PROCEDURE — 99244 OFF/OP CNSLTJ NEW/EST MOD 40: CPT | Performed by: ANESTHESIOLOGY

## 2019-03-04 NOTE — TELEPHONE ENCOUNTER
Cecil Reardon and I seen this patient today  He recently underwent a [employer] spinal cord stimulator trial  We discussed with him about proceeding a [employer] DRG trial  He reports that he recently had a psychological evaluation with Dr Drummond prior to his spinal cord stimulator trial in January  Can you please call Dr Drummond's office and obtain the psychological evaluation? Can you also set him up with information for an educational class?

## 2019-03-04 NOTE — PROGRESS NOTES
Assessment:  1  Complex regional pain syndrome type 1 of right lower extremity        Plan:  Moi Chandler is a 34 y o  male who presents for initial consultation for right ankle pain which has been present for the last 10 months  The patient presents today with ongoing right foot pain, as a result of complex regional pain syndrome  The patient has tried multiple interventions to help with his pain including sympathetic nerve blocks, a [employer] Spinal cord stimulator trial which provided 30% pain relief as well as multiple therapies and medications  He will be seeing Dr Bromberg next week for a ketamine infusion to help with his pain and symptoms  I discussed with the patient the possibility of proceeding with a [employer] DRG spinal cord stimulator trial to help with his pain and symptoms  He was educated on this procedure during his office visit today and was given a brochure on this procedure  He recently had a psychological evaluation by Dr Drummond  Therefore, he was instructed that our office will obtain this evaluation to proceed with a DRG trial  He was instructed that the spinal cord stimulator coordinator will contact him on information to proceed with an educational class prior to the trial  The patient stated that he would first like to see if his ketamine infusion is helpful prior to proceeding with the trial      The patient did have a 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 football injury  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  Therefore, we will avoid the use of opioids to treat his pain  However, I discussed with the patient additional medication options to help with his pain such as nortriptyline, amitriptyline, and Cymbalta  However, he will hold off on any of these medications until he has completed his ketamine infusion  The patient will follow up after this ketamine infusion or sooner with the worsening of symptoms       My impressions and treatment recommendations were discussed in detail with the patient who verbalized understanding and had no further questions  Discharge instructions were provided  I personally saw and examined the patient and I agree with the above discussed plan of care  No orders of the defined types were placed in this encounter  No orders of the defined types were placed in this encounter  History of Present Illness:    Flor Eddy is a 34 y o  male who presents for initial consultation for right ankle pain which has been present for the last 10 months  The patient contributes his pain is symptoms due to foot swelling that occurred on April 2018  He denies any predisposing injury and trauma  He reports that his pain is severe nature occurring nearly constant with symptoms worsening during the morning hours  He currently rates his pain at 10 out 10 numeric pain scale  He describes his pain as cramping, shooting, sharp, pressure-like, throbbing, freezing pain  He reports week is in his upper and lower extremities  He reports that his pain is decreased with lying down  He reports no change in pain with prayer, sitting, relaxation, coughing/sneezing, bowel movements  The patient reports increased pain with standing, bending, exercising, relaxation  Patient's treatment history includes surgery, exercise which provided no relief of symptoms  The patient reports that he underwent a nerve block/injection the past which provided him 4 hr relief of symptoms  The patient has tried Lyrica and gabapentin in the past     I have personally reviewed and/or updated the patient's past medical history, past surgical history, family history, social history, current medications, allergies, and vital signs today  Review of Systems:    Review of Systems   Constitutional: Positive for unexpected weight change  Negative for fever  HENT: Negative for trouble swallowing  Eyes: Negative for visual disturbance     Respiratory: Negative for shortness of breath and wheezing  Cardiovascular: Negative for chest pain and palpitations  Gastrointestinal: Negative for constipation, diarrhea, nausea and vomiting  Endocrine: Negative for cold intolerance, heat intolerance and polydipsia  Genitourinary: Negative for difficulty urinating and frequency  Musculoskeletal: Positive for gait problem and joint swelling  Negative for arthralgias and myalgias  Skin: Negative for rash  Neurological: Positive for weakness  Negative for dizziness, seizures, syncope and headaches  Hematological: Does not bruise/bleed easily  Psychiatric/Behavioral: Negative for dysphoric mood  All other systems reviewed and are negative  Patient Active Problem List   Diagnosis    Anxiety    Insomnia    Edema    Arthralgia    Weight loss    Right foot pain    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[address] )    Complex regional pain syndrome type 1 of right lower extremity    Prolonged QT interval    Raynaud phenomenon    Left-sided chest wall pain    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    Constipation       No past medical history on file  Past Surgical History:   Procedure Laterality Date    ARTHROSCOPIC REPAIR ACL Right     ROTATOR CUFF REPAIR Right        Family History   Problem Relation Age of Onset    No Known Problems Mother     No Known Problems Father        Social History     Occupational History    Occupation: Full-time employment   Tobacco Use    Smoking status: Current Every Day Smoker    Smokeless tobacco: Current User   Substance and Sexual Activity    Alcohol use: Not on file    Drug use: No    Sexual activity: Not on file       Current Outpatient Medications on File Prior to Visit   Medication Sig    ALPRAZolam (XANAX) 2 MG tablet 2 bid, 1 qhs    hydrOXYzine pamoate (VISTARIL) 50 mg capsule 1-2 every 4 hr as needed for anxiety     No current facility-administered medications on file prior to visit          No Known Allergies    Physical Exam:    /92   Pulse 74   Temp 98 8 °F (37 1 °C) (Oral)   Wt 79 8 kg (176 lb)   BMI 23 22 kg/m²     Constitutional: normal, well developed, well nourished, alert, in no distress and non-toxic and no overt pain behavior  Eyes: anicteric  HEENT: grossly intact  Neck: supple, symmetric, trachea midline and no masses   Pulmonary:even and unlabored  Cardiovascular:No edema or pitting edema present  Skin:Normal without rashes or lesions and well hydrated  Psychiatric:Mood and affect appropriate  Neurologic:Cranial Nerves II-XII grossly intact  Musculoskeletal:normal    Right Foot Exam:   Hyperesthesia with light palpation 1 cm proximal to the mid malleolus down the anterior aspect of mid metatarsals  Range of motion of right foot non limited with pain  Imaging    THREE PHASE BONE SCAN OF THE FEET AND ANKLES     INDICATION: Acute right ankle pain, swelling, tenderness to touch right lower extremity, 2008 right ACL repair, G90 521: Complex regional pain syndrome i of right lower limb  M25 571: Pain in right ankle and joints of right foot  G89 29: Other chronic pain     PREVIOUS FILM CORRELATION:    No prior pertinent studies for comparison      RADIOPHARMACEUTICAL 23 5 mCi Tc-99m MDP IV     TECHNIQUE:  Perfusion images of the feet and ankles were acquired in the anterior and posterior projection  Blood pool and 2-3 hour delayed images were acquired of the feet and ankles in multiple projections       FINDINGS:      PERFUSION:   Symmetric perfusion to bilateral feet and ankles      BLOOD POOL:  Symmetric unremarkable distribution of the radiotracer      DELAYED:  Delayed 3 hour images of the feet are symmetric and unremarkable, though osseous uptake is limited  Additional 24 hour delayed images of the knees, tibias and feet and ankles are symmetric and unremarkable  There are no focal osseous lesions      IMPRESSION:     1    Symmetric perfusion and radiotracer uptake to both distal lower extremities  No osseous lesions demonstrated  No scintigraphic evidence for CRPS

## 2019-03-05 NOTE — TELEPHONE ENCOUNTER
Pt to be an [employer] DRG trial with Dr Arlette Harris  Pt had a psych eval done with Dr Philip Harry   LM asking Dr Philip Harry to fax eval to me  LM for patient to call me to schedule education class

## 2019-03-05 NOTE — TELEPHONE ENCOUNTER
Spoke with patient's mother and d/t the pain patient is experiencing I will ask [email or web address] to educate patient via phone  Email sent to [employer]    Clinical info excluding psych eval faxed to [employer]

## 2019-03-13 NOTE — TELEPHONE ENCOUNTER
Right L5-S1  Can you find out if he had a Lumbar MRI  I can't see it in the scans  If he hasn't I will order one

## 2019-03-15 PROBLEM — G89.4 CHRONIC PAIN SYNDROME: Status: ACTIVE | Noted: 2019-03-15

## 2019-03-15 NOTE — TELEPHONE ENCOUNTER
Dr Her Favors, Can you please order Lumbar MRI? Received authorization, Auth #[id number], valid 3/5/19-6/5/19      Spoke with patient and he is scheduled as follows:  4/22/19 @ 1300 w/ Leonides Alcantar for H&P  5/2/19 approx 1200 for 1300 trial [address]  [facility]  5/8/19 @ 1315 w/ Akilah Givens for lead removal  NKDA  Pt denies any blood thinning meds/NSAIDS  Email sent to [employer] to inform them of trial dates

## 2019-04-04 ENCOUNTER — OFFICE VISIT (OUTPATIENT)
Dept: FAMILY MEDICINE CLINIC | Facility: CLINIC | Age: 29
End: 2019-04-04
Payer: COMMERCIAL

## 2019-04-04 VITALS
HEART RATE: 71 BPM | WEIGHT: 165 LBS | RESPIRATION RATE: 16 BRPM | SYSTOLIC BLOOD PRESSURE: 110 MMHG | DIASTOLIC BLOOD PRESSURE: 68 MMHG | OXYGEN SATURATION: 98 % | BODY MASS INDEX: 21.87 KG/M2 | HEIGHT: 73 IN | TEMPERATURE: 98.5 F

## 2019-04-04 DIAGNOSIS — F41.9 ANXIETY: ICD-10-CM

## 2019-04-04 DIAGNOSIS — G47.00 INSOMNIA, UNSPECIFIED TYPE: ICD-10-CM

## 2019-04-04 DIAGNOSIS — G90.521 COMPLEX REGIONAL PAIN SYNDROME TYPE 1 OF RIGHT LOWER EXTREMITY: Primary | ICD-10-CM

## 2019-04-04 PROCEDURE — 99213 OFFICE O/P EST LOW 20 MIN: CPT | Performed by: FAMILY MEDICINE

## 2019-04-04 PROCEDURE — 3008F BODY MASS INDEX DOCD: CPT | Performed by: FAMILY MEDICINE

## 2019-04-04 RX ORDER — ALPRAZOLAM 2 MG/1
TABLET ORAL
Qty: 150 TABLET | Refills: 3 | Status: SHIPPED | OUTPATIENT
Start: 2019-04-04 | End: 2019-08-05 | Stop reason: SDUPTHER

## 2019-04-05 ENCOUNTER — HOSPITAL ENCOUNTER (OUTPATIENT)
Dept: RADIOLOGY | Facility: HOSPITAL | Age: 29
Discharge: HOME | End: 2019-04-05
Attending: ANESTHESIOLOGY
Payer: COMMERCIAL

## 2019-04-05 DIAGNOSIS — G90.521 COMPLEX REGIONAL PAIN SYNDROME TYPE 1 OF RIGHT LOWER EXTREMITY: ICD-10-CM

## 2019-04-05 PROCEDURE — 72148 MRI LUMBAR SPINE W/O DYE: CPT

## 2019-04-08 ENCOUNTER — TELEPHONE (OUTPATIENT)
Dept: PAIN MEDICINE | Facility: CLINIC | Age: 29
End: 2019-04-08

## 2019-04-08 NOTE — TELEPHONE ENCOUNTER
Dr Vera Julio, Can you please give patient a call? [phone]  Phone call from mom stating she is upset that they are not seeing you for a pre procedure visit and that this is unacceptable

## 2019-04-12 DIAGNOSIS — G90.521 COMPLEX REGIONAL PAIN SYNDROME TYPE 1 OF RIGHT LOWER EXTREMITY: ICD-10-CM

## 2019-04-12 DIAGNOSIS — G89.4 CHRONIC PAIN SYNDROME: Primary | ICD-10-CM

## 2019-04-12 DIAGNOSIS — Z01.812 PRE-PROCEDURE LAB EXAM: ICD-10-CM

## 2019-04-12 DIAGNOSIS — Z79.01 CHRONIC ANTICOAGULATION: ICD-10-CM

## 2019-04-18 ENCOUNTER — ANESTHESIA EVENT (OUTPATIENT)
Dept: PERIOP | Facility: AMBULARY SURGERY CENTER | Age: 29
End: 2019-04-18

## 2019-04-24 ENCOUNTER — OFFICE VISIT (OUTPATIENT)
Dept: PAIN MEDICINE | Facility: CLINIC | Age: 29
End: 2019-04-24
Payer: COMMERCIAL

## 2019-04-24 VITALS — TEMPERATURE: 98.4 F | DIASTOLIC BLOOD PRESSURE: 76 MMHG | SYSTOLIC BLOOD PRESSURE: 124 MMHG | HEART RATE: 76 BPM

## 2019-04-24 DIAGNOSIS — G90.521 COMPLEX REGIONAL PAIN SYNDROME TYPE 1 OF RIGHT LOWER EXTREMITY: ICD-10-CM

## 2019-04-24 DIAGNOSIS — M51.16 INTERVERTEBRAL DISC DISORDER WITH RADICULOPATHY OF LUMBAR REGION: Primary | ICD-10-CM

## 2019-04-24 PROBLEM — M51.17 INTERVERTEBRAL DISC DISORDER WITH RADICULOPATHY OF LUMBOSACRAL REGION: Status: ACTIVE | Noted: 2019-04-24

## 2019-04-24 PROCEDURE — 99214 OFFICE O/P EST MOD 30 MIN: CPT | Performed by: ANESTHESIOLOGY

## 2019-04-26 ENCOUNTER — HOSPITAL ENCOUNTER (OUTPATIENT)
Dept: RADIOLOGY | Facility: CLINIC | Age: 29
Discharge: HOME | End: 2019-04-26
Attending: ANESTHESIOLOGY | Admitting: ANESTHESIOLOGY
Payer: COMMERCIAL

## 2019-04-26 VITALS
DIASTOLIC BLOOD PRESSURE: 68 MMHG | OXYGEN SATURATION: 100 % | HEART RATE: 71 BPM | SYSTOLIC BLOOD PRESSURE: 118 MMHG | RESPIRATION RATE: 18 BRPM | TEMPERATURE: 97.5 F

## 2019-04-26 DIAGNOSIS — G90.521 COMPLEX REGIONAL PAIN SYNDROME TYPE 1 OF RIGHT LOWER EXTREMITY: ICD-10-CM

## 2019-04-26 PROCEDURE — 64484 NJX AA&/STRD TFRM EPI L/S EA: CPT | Performed by: ANESTHESIOLOGY

## 2019-04-26 PROCEDURE — 64483 NJX AA&/STRD TFRM EPI L/S 1: CPT | Performed by: ANESTHESIOLOGY

## 2019-04-26 RX ORDER — BUPIVACAINE HCL/PF 2.5 MG/ML
10 VIAL (ML) INJECTION ONCE
Status: COMPLETED | OUTPATIENT
Start: 2019-04-26 | End: 2019-04-26

## 2019-04-26 RX ORDER — METHYLPREDNISOLONE ACETATE 80 MG/ML
80 INJECTION, SUSPENSION INTRA-ARTICULAR; INTRALESIONAL; INTRAMUSCULAR; PARENTERAL; SOFT TISSUE ONCE
Status: COMPLETED | OUTPATIENT
Start: 2019-04-26 | End: 2019-04-26

## 2019-04-26 RX ORDER — 0.9 % SODIUM CHLORIDE 0.9 %
10 VIAL (ML) INJECTION ONCE
Status: COMPLETED | OUTPATIENT
Start: 2019-04-26 | End: 2019-04-26

## 2019-04-26 RX ADMIN — Medication 5 ML: at 10:05

## 2019-04-26 RX ADMIN — METHYLPREDNISOLONE ACETATE 80 MG: 80 INJECTION, SUSPENSION INTRA-ARTICULAR; INTRALESIONAL; INTRAMUSCULAR; PARENTERAL; SOFT TISSUE at 10:07

## 2019-04-26 RX ADMIN — IOHEXOL 1 ML: 300 INJECTION, SOLUTION INTRAVENOUS at 10:07

## 2019-04-26 RX ADMIN — BUPIVACAINE HYDROCHLORIDE 2 ML: 2.5 INJECTION, SOLUTION EPIDURAL; INFILTRATION; INTRACAUDAL at 10:07

## 2019-04-26 RX ADMIN — SODIUM CHLORIDE 5 ML: 9 INJECTION, SOLUTION INTRAMUSCULAR; INTRAVENOUS; SUBCUTANEOUS at 10:05

## 2019-04-30 ENCOUNTER — HOSPITAL ENCOUNTER (OUTPATIENT)
Dept: NEUROLOGY | Facility: CLINIC | Age: 29
Discharge: HOME | End: 2019-04-30
Payer: COMMERCIAL

## 2019-04-30 DIAGNOSIS — G90.521 COMPLEX REGIONAL PAIN SYNDROME TYPE 1 OF RIGHT LOWER EXTREMITY: ICD-10-CM

## 2019-04-30 PROCEDURE — 95886 MUSC TEST DONE W/N TEST COMP: CPT | Performed by: PHYSICAL MEDICINE & REHABILITATION

## 2019-04-30 PROCEDURE — 95911 NRV CNDJ TEST 9-10 STUDIES: CPT | Performed by: PHYSICAL MEDICINE & REHABILITATION

## 2019-05-02 ENCOUNTER — ANESTHESIA (OUTPATIENT)
Dept: PERIOP | Facility: AMBULARY SURGERY CENTER | Age: 29
End: 2019-05-02

## 2019-05-03 ENCOUNTER — TELEPHONE (OUTPATIENT)
Dept: PAIN MEDICINE | Facility: CLINIC | Age: 29
End: 2019-05-03

## 2019-05-03 DIAGNOSIS — M51.17 INTERVERTEBRAL DISC DISORDER WITH RADICULOPATHY OF LUMBOSACRAL REGION: Primary | ICD-10-CM

## 2019-05-14 ENCOUNTER — TELEPHONE (OUTPATIENT)
Dept: PAIN MEDICINE | Facility: CLINIC | Age: 29
End: 2019-05-14

## 2019-05-14 ENCOUNTER — CONSULT (OUTPATIENT)
Dept: NEUROSURGERY | Facility: CLINIC | Age: 29
End: 2019-05-14
Payer: COMMERCIAL

## 2019-05-14 VITALS
BODY MASS INDEX: 21.79 KG/M2 | DIASTOLIC BLOOD PRESSURE: 77 MMHG | HEIGHT: 73 IN | TEMPERATURE: 97.8 F | WEIGHT: 164.4 LBS | RESPIRATION RATE: 16 BRPM | SYSTOLIC BLOOD PRESSURE: 130 MMHG | HEART RATE: 48 BPM

## 2019-05-14 DIAGNOSIS — G90.521 COMPLEX REGIONAL PAIN SYNDROME TYPE 1 OF RIGHT LOWER EXTREMITY: Primary | ICD-10-CM

## 2019-05-14 DIAGNOSIS — M51.17 INTERVERTEBRAL DISC DISORDER WITH RADICULOPATHY OF LUMBOSACRAL REGION: ICD-10-CM

## 2019-05-14 DIAGNOSIS — G89.4 CHRONIC PAIN SYNDROME: ICD-10-CM

## 2019-05-14 PROCEDURE — 99243 OFF/OP CNSLTJ NEW/EST LOW 30: CPT | Performed by: NEUROLOGICAL SURGERY

## 2019-05-16 RX ORDER — CEFAZOLIN SODIUM 1 G/50ML
1000 SOLUTION INTRAVENOUS ONCE
Status: CANCELLED | OUTPATIENT
Start: 2019-05-16 | End: 2019-05-16

## 2019-05-23 ENCOUNTER — OFFICE VISIT (OUTPATIENT)
Dept: PAIN MEDICINE | Facility: CLINIC | Age: 29
End: 2019-05-23
Payer: COMMERCIAL

## 2019-05-23 VITALS — HEART RATE: 71 BPM | TEMPERATURE: 98 F | SYSTOLIC BLOOD PRESSURE: 124 MMHG | DIASTOLIC BLOOD PRESSURE: 80 MMHG

## 2019-05-23 DIAGNOSIS — G90.521 COMPLEX REGIONAL PAIN SYNDROME TYPE 1 OF RIGHT LOWER EXTREMITY: ICD-10-CM

## 2019-05-23 DIAGNOSIS — G89.4 CHRONIC PAIN SYNDROME: Primary | ICD-10-CM

## 2019-05-23 DIAGNOSIS — M51.17 INTERVERTEBRAL DISC DISORDER WITH RADICULOPATHY OF LUMBOSACRAL REGION: ICD-10-CM

## 2019-05-23 PROCEDURE — 99214 OFFICE O/P EST MOD 30 MIN: CPT | Performed by: NURSE PRACTITIONER

## 2019-05-30 LAB — HBA1C MFR BLD HPLC: 4.8 %

## 2019-06-05 ENCOUNTER — TELEPHONE (OUTPATIENT)
Dept: OTHER | Facility: HOSPITAL | Age: 29
End: 2019-06-05

## 2019-06-06 ENCOUNTER — TELEPHONE (OUTPATIENT)
Dept: RADIOLOGY | Facility: CLINIC | Age: 29
End: 2019-06-06

## 2019-06-06 ENCOUNTER — DOCUMENTATION (OUTPATIENT)
Dept: OTHER | Facility: HOSPITAL | Age: 29
End: 2019-06-06

## 2019-06-06 ENCOUNTER — HOSPITAL ENCOUNTER (OUTPATIENT)
Dept: RADIOLOGY | Facility: CLINIC | Age: 29
Discharge: HOME | End: 2019-06-06
Payer: COMMERCIAL

## 2019-06-06 VITALS
OXYGEN SATURATION: 97 % | HEART RATE: 74 BPM | SYSTOLIC BLOOD PRESSURE: 131 MMHG | TEMPERATURE: 98.6 F | RESPIRATION RATE: 20 BRPM | DIASTOLIC BLOOD PRESSURE: 84 MMHG

## 2019-06-06 DIAGNOSIS — G89.4 CHRONIC PAIN SYNDROME: ICD-10-CM

## 2019-06-06 DIAGNOSIS — G90.521 COMPLEX REGIONAL PAIN SYNDROME TYPE 1 OF RIGHT LOWER EXTREMITY: ICD-10-CM

## 2019-06-06 PROCEDURE — 96374 THER/PROPH/DIAG INJ IV PUSH: CPT

## 2019-06-06 PROCEDURE — 95972 ALYS CPLX SP/PN NPGT W/PRGRM: CPT | Performed by: ANESTHESIOLOGY

## 2019-06-06 PROCEDURE — 63650 IMPLANT NEUROELECTRODES: CPT | Performed by: ANESTHESIOLOGY

## 2019-06-06 RX ORDER — CEFAZOLIN SODIUM 1 G/50ML
1000 SOLUTION INTRAVENOUS ONCE
Status: COMPLETED | OUTPATIENT
Start: 2019-06-06 | End: 2019-06-06

## 2019-06-06 RX ORDER — CEPHALEXIN 500 MG/1
500 CAPSULE ORAL EVERY 6 HOURS SCHEDULED
Qty: 28 CAPSULE | Refills: 0 | Status: SHIPPED | OUTPATIENT
Start: 2019-06-06 | End: 2019-06-13

## 2019-06-06 RX ADMIN — CEFAZOLIN SODIUM 1000 MG: 1 SOLUTION INTRAVENOUS at 13:42

## 2019-06-07 ENCOUNTER — DOCUMENTATION (OUTPATIENT)
Dept: OTHER | Facility: HOSPITAL | Age: 29
End: 2019-06-07

## 2019-06-11 ENCOUNTER — TELEPHONE (OUTPATIENT)
Dept: OTHER | Facility: HOSPITAL | Age: 29
End: 2019-06-11

## 2019-06-12 ENCOUNTER — OFFICE VISIT (OUTPATIENT)
Dept: PAIN MEDICINE | Facility: CLINIC | Age: 29
End: 2019-06-12

## 2019-06-12 VITALS — TEMPERATURE: 98.8 F | SYSTOLIC BLOOD PRESSURE: 128 MMHG | HEART RATE: 70 BPM | DIASTOLIC BLOOD PRESSURE: 78 MMHG

## 2019-06-12 DIAGNOSIS — G90.521 COMPLEX REGIONAL PAIN SYNDROME TYPE 1 OF RIGHT LOWER EXTREMITY: ICD-10-CM

## 2019-06-12 DIAGNOSIS — G89.4 CHRONIC PAIN SYNDROME: Primary | ICD-10-CM

## 2019-06-12 PROCEDURE — 99024 POSTOP FOLLOW-UP VISIT: CPT | Performed by: ANESTHESIOLOGY

## 2019-06-12 RX ORDER — DICLOFENAC SODIUM 75 MG/1
75 TABLET, DELAYED RELEASE ORAL 2 TIMES DAILY
Qty: 60 TABLET | Refills: 0 | Status: SHIPPED | OUTPATIENT
Start: 2019-06-12 | End: 2019-07-08 | Stop reason: ALTCHOICE

## 2019-06-28 ENCOUNTER — TRANSCRIBE ORDERS (OUTPATIENT)
Dept: ADMINISTRATIVE | Facility: HOSPITAL | Age: 29
End: 2019-06-28

## 2019-06-28 DIAGNOSIS — R53.1 ASTHENIA: ICD-10-CM

## 2019-06-28 DIAGNOSIS — M62.89 MYOFIBROSIS: ICD-10-CM

## 2019-06-28 DIAGNOSIS — R63.4 LOSS OF WEIGHT: Primary | ICD-10-CM

## 2019-07-02 ENCOUNTER — TELEPHONE (OUTPATIENT)
Dept: FAMILY MEDICINE CLINIC | Facility: CLINIC | Age: 29
End: 2019-07-02

## 2019-07-02 DIAGNOSIS — F41.9 ANXIETY: ICD-10-CM

## 2019-07-02 RX ORDER — HYDROXYZINE PAMOATE 50 MG/1
CAPSULE ORAL
Qty: 60 CAPSULE | Refills: 5 | Status: SHIPPED | OUTPATIENT
Start: 2019-07-02 | End: 2019-07-03

## 2019-07-03 DIAGNOSIS — F41.9 ANXIETY: Primary | ICD-10-CM

## 2019-07-03 RX ORDER — HYDROXYZINE HYDROCHLORIDE 25 MG/1
25 TABLET, FILM COATED ORAL EVERY 6 HOURS PRN
Qty: 30 TABLET | Refills: 0 | Status: SHIPPED | OUTPATIENT
Start: 2019-07-03 | End: 2019-07-08

## 2019-07-03 NOTE — TELEPHONE ENCOUNTER
[facility] on [facility] back[facility]  - they do carry Atarax - can you send for BD's pt ???  PLEASE CALL PT BACK

## 2019-07-05 ENCOUNTER — TELEPHONE (OUTPATIENT)
Dept: FAMILY MEDICINE CLINIC | Facility: CLINIC | Age: 29
End: 2019-07-05

## 2019-07-05 ENCOUNTER — HOSPITAL ENCOUNTER (OUTPATIENT)
Dept: RADIOLOGY | Facility: MEDICAL CENTER | Age: 29
Discharge: HOME | End: 2019-07-05
Payer: COMMERCIAL

## 2019-07-05 DIAGNOSIS — R53.1 ASTHENIA: ICD-10-CM

## 2019-07-05 DIAGNOSIS — M62.89 MYOFIBROSIS: ICD-10-CM

## 2019-07-05 DIAGNOSIS — R63.4 LOSS OF WEIGHT: ICD-10-CM

## 2019-07-05 DIAGNOSIS — F41.9 ANXIETY: Primary | ICD-10-CM

## 2019-07-05 PROCEDURE — 76870 US EXAM SCROTUM: CPT

## 2019-07-05 PROCEDURE — 71250 CT THORAX DX C-: CPT

## 2019-07-05 PROCEDURE — 74176 CT ABD & PELVIS W/O CONTRAST: CPT

## 2019-07-05 NOTE — TELEPHONE ENCOUNTER
[facility] for Hydroxyzine HCL 25 mg  It is not covered by insurance     Please switch to Hydroxyzine MARY ANN 25 mg Capsule  [id number]  [id number]

## 2019-07-08 ENCOUNTER — TELEPHONE (OUTPATIENT)
Dept: NEUROSURGERY | Facility: CLINIC | Age: 29
End: 2019-07-08

## 2019-07-08 ENCOUNTER — APPOINTMENT (OUTPATIENT)
Dept: LAB | Facility: HOSPITAL | Age: 29
End: 2019-07-08
Attending: NEUROLOGICAL SURGERY
Payer: COMMERCIAL

## 2019-07-08 ENCOUNTER — OFFICE VISIT (OUTPATIENT)
Dept: NEUROSURGERY | Facility: CLINIC | Age: 29
End: 2019-07-08
Payer: COMMERCIAL

## 2019-07-08 VITALS
HEART RATE: 72 BPM | TEMPERATURE: 98.3 F | WEIGHT: 164 LBS | BODY MASS INDEX: 21.05 KG/M2 | SYSTOLIC BLOOD PRESSURE: 118 MMHG | RESPIRATION RATE: 16 BRPM | DIASTOLIC BLOOD PRESSURE: 72 MMHG | HEIGHT: 74 IN

## 2019-07-08 DIAGNOSIS — Z98.890 POSTOPERATIVE STATE: Primary | ICD-10-CM

## 2019-07-08 DIAGNOSIS — G89.4 CHRONIC PAIN SYNDROME: ICD-10-CM

## 2019-07-08 DIAGNOSIS — G90.521 COMPLEX REGIONAL PAIN SYNDROME TYPE 1 OF RIGHT LOWER EXTREMITY: ICD-10-CM

## 2019-07-08 DIAGNOSIS — G89.18 POSTOPERATIVE PAIN: ICD-10-CM

## 2019-07-08 LAB
ALBUMIN SERPL BCP-MCNC: 4.8 G/DL (ref 3.5–5)
ALP SERPL-CCNC: 36 U/L (ref 46–116)
ALT SERPL W P-5'-P-CCNC: 26 U/L (ref 12–78)
ANION GAP SERPL CALCULATED.3IONS-SCNC: 7 MMOL/L (ref 4–13)
APTT PPP: 30 SECONDS (ref 23–37)
AST SERPL W P-5'-P-CCNC: 19 U/L (ref 5–45)
BASOPHILS # BLD AUTO: 0.02 THOUSANDS/ΜL (ref 0–0.1)
BASOPHILS NFR BLD AUTO: 1 % (ref 0–1)
BILIRUB SERPL-MCNC: 1.2 MG/DL (ref 0.2–1)
BILIRUB UR QL STRIP: NEGATIVE
BUN SERPL-MCNC: 13 MG/DL (ref 5–25)
CALCIUM SERPL-MCNC: 9.4 MG/DL (ref 8.3–10.1)
CHLORIDE SERPL-SCNC: 103 MMOL/L (ref 100–108)
CLARITY UR: CLEAR
CO2 SERPL-SCNC: 33 MMOL/L (ref 21–32)
COLOR UR: YELLOW
CREAT SERPL-MCNC: 0.81 MG/DL (ref 0.6–1.3)
EOSINOPHIL # BLD AUTO: 0.05 THOUSAND/ΜL (ref 0–0.61)
EOSINOPHIL NFR BLD AUTO: 1 % (ref 0–6)
ERYTHROCYTE [DISTWIDTH] IN BLOOD BY AUTOMATED COUNT: 12.1 % (ref 11.6–15.1)
EST. AVERAGE GLUCOSE BLD GHB EST-MCNC: 91 MG/DL
GFR SERPL CREATININE-BSD FRML MDRD: 122 ML/MIN/1.73SQ M
GLUCOSE SERPL-MCNC: 82 MG/DL (ref 65–140)
GLUCOSE UR STRIP-MCNC: NEGATIVE MG/DL
HBA1C MFR BLD: 4.8 % (ref 4.2–6.3)
HCT VFR BLD AUTO: 45.5 % (ref 36.5–49.3)
HGB BLD-MCNC: 15.7 G/DL (ref 12–17)
HGB UR QL STRIP.AUTO: NEGATIVE
IMM GRANULOCYTES # BLD AUTO: 0.01 THOUSAND/UL (ref 0–0.2)
IMM GRANULOCYTES NFR BLD AUTO: 0 % (ref 0–2)
INR PPP: 1.09 (ref 0.84–1.19)
KETONES UR STRIP-MCNC: NEGATIVE MG/DL
LEUKOCYTE ESTERASE UR QL STRIP: NEGATIVE
LYMPHOCYTES # BLD AUTO: 0.92 THOUSANDS/ΜL (ref 0.6–4.47)
LYMPHOCYTES NFR BLD AUTO: 21 % (ref 14–44)
MCH RBC QN AUTO: 31.4 PG (ref 26.8–34.3)
MCHC RBC AUTO-ENTMCNC: 34.5 G/DL (ref 31.4–37.4)
MCV RBC AUTO: 91 FL (ref 82–98)
MONOCYTES # BLD AUTO: 0.3 THOUSAND/ΜL (ref 0.17–1.22)
MONOCYTES NFR BLD AUTO: 7 % (ref 4–12)
NEUTROPHILS # BLD AUTO: 3 THOUSANDS/ΜL (ref 1.85–7.62)
NEUTS SEG NFR BLD AUTO: 70 % (ref 43–75)
NITRITE UR QL STRIP: NEGATIVE
NRBC BLD AUTO-RTO: 0 /100 WBCS
PH UR STRIP.AUTO: 6.5 [PH]
PLATELET # BLD AUTO: 163 THOUSANDS/UL (ref 149–390)
PMV BLD AUTO: 10 FL (ref 8.9–12.7)
POTASSIUM SERPL-SCNC: 4 MMOL/L (ref 3.5–5.3)
PROT SERPL-MCNC: 8.1 G/DL (ref 6.4–8.2)
PROT UR STRIP-MCNC: NEGATIVE MG/DL
PROTHROMBIN TIME: 13.8 SECONDS (ref 11.6–14.5)
RBC # BLD AUTO: 5 MILLION/UL (ref 3.88–5.62)
SODIUM SERPL-SCNC: 143 MMOL/L (ref 136–145)
SP GR UR STRIP.AUTO: 1.01 (ref 1–1.03)
UROBILINOGEN UR QL STRIP.AUTO: 0.2 E.U./DL
WBC # BLD AUTO: 4.3 THOUSAND/UL (ref 4.31–10.16)

## 2019-07-08 PROCEDURE — 85730 THROMBOPLASTIN TIME PARTIAL: CPT

## 2019-07-08 PROCEDURE — 99215 OFFICE O/P EST HI 40 MIN: CPT | Performed by: NEUROLOGICAL SURGERY

## 2019-07-08 PROCEDURE — 85610 PROTHROMBIN TIME: CPT

## 2019-07-08 PROCEDURE — 83036 HEMOGLOBIN GLYCOSYLATED A1C: CPT

## 2019-07-08 PROCEDURE — 81003 URINALYSIS AUTO W/O SCOPE: CPT | Performed by: NEUROLOGICAL SURGERY

## 2019-07-08 PROCEDURE — 36415 COLL VENOUS BLD VENIPUNCTURE: CPT

## 2019-07-08 PROCEDURE — 80053 COMPREHEN METABOLIC PANEL: CPT

## 2019-07-08 PROCEDURE — 85025 COMPLETE CBC W/AUTO DIFF WBC: CPT

## 2019-07-08 RX ORDER — HYDROXYZINE PAMOATE 25 MG/1
25 CAPSULE ORAL EVERY 4 HOURS PRN
Qty: 100 CAPSULE | Refills: 5 | Status: SHIPPED | OUTPATIENT
Start: 2019-07-08 | End: 2019-12-03 | Stop reason: SDUPTHER

## 2019-07-08 RX ORDER — OXYCODONE HYDROCHLORIDE 5 MG/1
5 TABLET ORAL EVERY 4 HOURS PRN
Qty: 30 TABLET | Refills: 0 | Status: SHIPPED | OUTPATIENT
Start: 2019-07-08 | End: 2019-08-08

## 2019-07-08 RX ORDER — MEXILETINE HYDROCHLORIDE 150 MG/1
150 CAPSULE ORAL 3 TIMES DAILY
COMMUNITY
Start: 2019-06-27 | End: 2020-01-06

## 2019-07-08 RX ORDER — CHLORHEXIDINE GLUCONATE 0.12 MG/ML
15 RINSE ORAL ONCE
Status: CANCELLED | OUTPATIENT
Start: 2019-07-08 | End: 2019-07-08

## 2019-07-08 RX ORDER — LEVOFLOXACIN 500 MG/1
500 TABLET, FILM COATED ORAL EVERY 24 HOURS
Qty: 3 TABLET | Refills: 0 | Status: SHIPPED | OUTPATIENT
Start: 2019-07-09 | End: 2019-07-12

## 2019-07-08 NOTE — PROGRESS NOTES
Assessment/Plan:    No problem-specific Assessment & Plan notes found for this encounter  Patient is stable  Symptoms, as detailed in HPI, continue to significantly impact of patient's quality of life in daily activities  After carefully considering presentation, investigations, functional status and co-morbidities, the risk/benefit profile of surgical intervention is favorable  History, physical examination and diagnostic tests were reviewed and questions answered  Diagnosis, care plan and treatment options were discussed  The patient understand instructions and will follow up as directed  Patient with complex region pain syndrome of the right foot  Successful dorsal root ganglion trial right L5, S1  We will proceed with permanent implantation     Expected postoperative course, including activity restrictions, expected pain and postoperative medication were reviewed  Patient provided verbal consent to surgical procedure and signed consent form: Yes    We also discussed the risks and benefits of the procedure the risks being including: infection (~2%), neurologic injury (<1%), new pain, revisions surgery, failure to relieve pain  The benefits including comparable relief as during the trial  The patient stated understanding of the risks and benefits and agreed to proceed  IMAGING REVIEWED: MRI lumbar shows no obvious limiting foraminal stenosis       Diagnoses and all orders for this visit:    Chronic pain syndrome  -     Ambulatory referral to Neurosurgery  -     UA w Reflex to Microscopic w Reflex to Culture  -     Comprehensive metabolic panel; Future  -     CBC and differential; Future  -     APTT; Future  -     Protime-INR; Future  -     HEMOGLOBIN A1C W/ EAG ESTIMATION;  Future  -     Case request operating room: 1 [facility]  (DRG) ELECTRODE RIGHT L5, S1 W IMPLANTABLE PULSE GENERATOR, RIGHT; Standing  -     Case request operating room: PLACEMENT DORSAL ROOT GANGLION (DRG) ELECTRODE RIGHT L5, S1 W IMPLANTABLE PULSE GENERATOR, RIGHT    Complex regional pain syndrome type 1 of right lower extremity  -     Ambulatory referral to Neurosurgery  -     UA w Reflex to Microscopic w Reflex to Culture  -     Comprehensive metabolic panel; Future  -     CBC and differential; Future  -     APTT; Future  -     Protime-INR; Future  -     HEMOGLOBIN A1C W/ EAG ESTIMATION; Future  -     Case request operating room: 1 [facility]  (DRG) ELECTRODE RIGHT L5, S1 W IMPLANTABLE PULSE GENERATOR, RIGHT; Standing  -     Case request operating room: PLACEMENT DORSAL ROOT GANGLION (DRG) ELECTRODE RIGHT L5, S1 W IMPLANTABLE PULSE GENERATOR, RIGHT    Other orders  -     mexiletine (MEXITIL) 150 mg capsule; Take 150 mg by mouth Three times a day  -     Diet NPO; Sips with meds; Standing  -     Nursing Communication [address] [place] [address] Interventions Implemented; Standing  -     Nursing Communication Use 2 CHG cloths, have the patient wash his/her body from the neck down or have staff wash entire body (from neck down) if patient is unable; Standing  -     Nursing Communication Swab both nares with Povidone-Iodine solution, EXCLUDE if patient has shellfish/Iodine allergy; Standing  -     chlorhexidine (PERIDEX) 0 12 % oral rinse 15 mL  -     Void on call to OR; Standing  -     Insert peripheral IV; Standing  -     ceFAZolin (ANCEF) 2,000 mg in dextrose 5 % 100 mL IVPB          Subjective:      Patient ID: Angie Whitney is a 34 y o  male  Patient is a 34year old male with symptoms of right foot pain  Pain is severe and sharp in quality  Pain distribution is calf and foot  Pain is worse with movement any touch  Pain is improved by recent stimulation  The pain is long standing  Maybe related to a knee surgery in the past  The patient underwent a successful dorsal root ganglion trial with 60-70 % relief of pain  They reported improvement in ambulation and rest  They are ready to proceed with surgery  The following portions of the patient's history were reviewed and updated as appropriate:   He  has a past medical history of Intervertebral disc disorder with radiculopathy of lumbosacral region (4/24/2019)  He   Patient Active Problem List    Diagnosis Date Noted    Radiculopathy, lumbar region     Intervertebral disc disorder with radiculopathy of lumbosacral region 04/24/2019    Chronic pain syndrome 03/15/2019    Complex regional pain syndrome type 1 of right lower extremity 09/04/2018    Right foot pain 07/03/2018    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[facility] [address] [facility] 07/03/2018    Edema 05/03/2018    Arthralgia 05/03/2018    Weight loss 05/03/2018    Insomnia 03/05/2018    Anxiety 02/02/2018    Raynaud phenomenon 11/30/2017    Prolonged QT interval 02/02/2017    Left-sided chest wall pain 04/06/2016    Constipation 01/04/2016    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 12/23/2015     He  has a past surgical history that includes Arthroscopic repair ACL (Right) and Rotator cuff repair (Right)  His family history includes No Known Problems in his father and mother  He  reports that he has been smoking  He uses smokeless tobacco  He reports that he does not drink alcohol or use drugs  Current Outpatient Medications   Medication Sig Dispense Refill    ALPRAZolam (XANAX) 2 MG tablet 2 bid, 1 qhs 150 tablet 3    hydrOXYzine pamoate (VISTARIL) 25 mg capsule Take 1 capsule (25 mg total) by mouth every 4 (four) hours as needed for itching or anxiety 100 capsule 5    mexiletine (MEXITIL) 150 mg capsule Take 150 mg by mouth Three times a day      diclofenac (VOLTAREN) 75 mg EC tablet Take 1 tablet (75 mg total) by mouth 2 (two) times a day 60 tablet 0     No current facility-administered medications for this visit        Current Outpatient Medications on File Prior to Visit   Medication Sig    ALPRAZolam (XANAX) 2 MG tablet 2 bid, 1 qhs    hydrOXYzine pamoate (VISTARIL) 25 mg capsule Take 1 capsule (25 mg total) by mouth every 4 (four) hours as needed for itching or anxiety    mexiletine (MEXITIL) 150 mg capsule Take 150 mg by mouth Three times a day    diclofenac (VOLTAREN) 75 mg EC tablet Take 1 tablet (75 mg total) by mouth 2 (two) times a day    [DISCONTINUED] hydrOXYzine HCL (ATARAX) 25 mg tablet Take 1 tablet (25 mg total) by mouth every 6 (six) hours as needed for anxiety     No current facility-administered medications on file prior to visit  He has No Known Allergies       Review of Systems   Constitutional: Positive for fatigue  Feeling poorly    HENT: Negative  Eyes: Negative  Respiratory: Negative  Cardiovascular: Negative  Gastrointestinal: Negative for nausea (due to increase of pain )  Endocrine: Negative  Genitourinary: Negative  Musculoskeletal: Positive for back pain (right side lower back radiates into right buttock, hip, and down right leg )  Right foot CRPS  With discoloration   Skin: Positive for color change (right foot)  Allergic/Immunologic: Negative  Neurological: Positive for weakness (right leg ) and headaches (lack of sleep due to pain)  Negative for dizziness, seizures, syncope and numbness  Hematological: Bruises/bleeds easily  Psychiatric/Behavioral: Positive for sleep disturbance (due to pain )  Negative for confusion  Objective:      /72 (BP Location: Left arm)   Pulse 72   Temp 98 3 °F (36 8 °C) (Tympanic)   Resp 16   Ht 6' 1 5" (1 867 m)   Wt 74 4 kg (164 lb)   BMI 21 34 kg/m²          Physical Exam   Constitutional: He is oriented to person, place, and time  He appears well-developed  No distress  HENT:   Head: Normocephalic and atraumatic  Nose: Nose normal    Eyes: Pupils are equal, round, and reactive to light  Conjunctivae and EOM are normal  Right eye exhibits no discharge  Left eye exhibits no discharge  No scleral icterus  Neck: Normal range of motion  Neck supple  No JVD present   No tracheal deviation present  No thyromegaly present  Cardiovascular: Normal rate  Pulmonary/Chest: Effort normal and breath sounds normal  No stridor  No respiratory distress  He has no wheezes  Abdominal: Soft  Neurological: He is alert and oriented to person, place, and time  He has normal strength  No cranial nerve deficit or sensory deficit  Pain limited PF RLE   Skin: Skin is warm and dry  No rash noted  He is not diaphoretic  No erythema  Psychiatric: He has a normal mood and affect   His behavior is normal  Judgment and thought content normal

## 2019-07-08 NOTE — TELEPHONE ENCOUNTER
Pre operative call day prior surgery scheduled in the AM w/ Dr Ryan Ernandez (DRG) ELECTRODE RIGHT L5, S1 W IMPLANTABLE PULSE GENERATOR, RIGHT (Right Spine Lumbar)---7/8/2019  Discussion/Review    Allergies ---Reviewed   Hold medications --- Reviewed reports never treated with Diclofenac ---did not  script  NPO after MN, night prior surgery ---Reviewed  Medication (s) instructed by healthcare provider to take the morning of surgery w/ sip of water 4 OZ discussed:as per ASU   Post operative scripts electronic transmission: Levaquin   Oxycodone   [facility] and reviewed scheduled [facility] list printed and scanned into record  Pain management script:oxycodone 5 mg , has  mg one tab every 4 hours ------    Pre- operative shower protocol reviewed; Clarify instructions as per protocol, third chlorhexidine shower tonight before surgery, then use CARMEN wipes as per packaging instructions, Use a clean towel and wash cloth starting tonight and continue nightly until seen 2 weeks post operative visit for incision check removal  Change bed linens tonight and continue at least 1-2 times weekly  --reinforced     Informed will receive a telephone call tonight from a hospital representative with time to report on surgery day:--reinforced      Informed will receive a f/u call within in 24 -48 hours post-op to assess recovery reinforce instructions, and to answer any questions  Follow-up appointments reviewed 7/22 8/23   Patient verbalized understanding information provided /discussed

## 2019-07-08 NOTE — TELEPHONE ENCOUNTER
Signed surgical consent in the presence of surgeon after procedure explained: [facility]  (DRG) ELECTRODE RIGHT L5, S1 W IMPLANTABLE PULSE GENERATOR, RIGHT (Right Spine Lumbar--right foot up to calf     Assessment for comorbid medical conditions:   HO adverse response to general anesthesia:denies  Surgical Procedures past 6 months:denies   DRG Trial May 2019   Cardiac:Denies   Pulmonary: Denies   Endocrine:  Denies   MISC/Oncology : denies   Skin intact/Diversion devices (urostomy, colostomy,ileostomy,):--denies   Personal history of venous thromboembolic disease:   Imaginin/15/2019 MTI Lumbar spine  Images in PACS  Pain management:  Alexis Daily   --prescribed diclofenac has never taken medication, katy not pickup script    Medications preoperative and postoperative (AC, ASA, NSAID, vitamins , dietary supplements , OTC)    Anticoagulant /Antiplatelet use  etc -explained in detail   Discussed overview of surgical process from office appointment thru 6 weeks post op:--done     Patient/mother  verbalized understanding, all questions were answered and contact information provided in the event future questions arise

## 2019-07-08 NOTE — H&P (VIEW-ONLY)
Assessment/Plan:    No problem-specific Assessment & Plan notes found for this encounter  Patient is stable  Symptoms, as detailed in HPI, continue to significantly impact of patient's quality of life in daily activities  After carefully considering presentation, investigations, functional status and co-morbidities, the risk/benefit profile of surgical intervention is favorable  History, physical examination and diagnostic tests were reviewed and questions answered  Diagnosis, care plan and treatment options were discussed  The patient understand instructions and will follow up as directed  Patient with complex region pain syndrome of the right foot  Successful dorsal root ganglion trial right L5, S1  We will proceed with permanent implantation     Expected postoperative course, including activity restrictions, expected pain and postoperative medication were reviewed  Patient provided verbal consent to surgical procedure and signed consent form: Yes    We also discussed the risks and benefits of the procedure the risks being including: infection (~2%), neurologic injury (<1%), new pain, revisions surgery, failure to relieve pain  The benefits including comparable relief as during the trial  The patient stated understanding of the risks and benefits and agreed to proceed  IMAGING REVIEWED: MRI lumbar shows no obvious limiting foraminal stenosis       Diagnoses and all orders for this visit:    Chronic pain syndrome  -     Ambulatory referral to Neurosurgery  -     UA w Reflex to Microscopic w Reflex to Culture  -     Comprehensive metabolic panel; Future  -     CBC and differential; Future  -     APTT; Future  -     Protime-INR; Future  -     HEMOGLOBIN A1C W/ EAG ESTIMATION;  Future  -     Case request operating room: 1 [facility]  (DRG) ELECTRODE RIGHT L5, S1 W IMPLANTABLE PULSE GENERATOR, RIGHT; Standing  -     Case request operating room: PLACEMENT DORSAL ROOT GANGLION (DRG) ELECTRODE RIGHT L5, S1 W IMPLANTABLE PULSE GENERATOR, RIGHT    Complex regional pain syndrome type 1 of right lower extremity  -     Ambulatory referral to Neurosurgery  -     UA w Reflex to Microscopic w Reflex to Culture  -     Comprehensive metabolic panel; Future  -     CBC and differential; Future  -     APTT; Future  -     Protime-INR; Future  -     HEMOGLOBIN A1C W/ EAG ESTIMATION; Future  -     Case request operating room: 1 [facility]  (DRG) ELECTRODE RIGHT L5, S1 W IMPLANTABLE PULSE GENERATOR, RIGHT; Standing  -     Case request operating room: PLACEMENT DORSAL ROOT GANGLION (DRG) ELECTRODE RIGHT L5, S1 W IMPLANTABLE PULSE GENERATOR, RIGHT    Other orders  -     mexiletine (MEXITIL) 150 mg capsule; Take 150 mg by mouth Three times a day  -     Diet NPO; Sips with meds; Standing  -     Nursing Communication [address] [place] [address] Interventions Implemented; Standing  -     Nursing Communication Use 2 CHG cloths, have the patient wash his/her body from the neck down or have staff wash entire body (from neck down) if patient is unable; Standing  -     Nursing Communication Swab both nares with Povidone-Iodine solution, EXCLUDE if patient has shellfish/Iodine allergy; Standing  -     chlorhexidine (PERIDEX) 0 12 % oral rinse 15 mL  -     Void on call to OR; Standing  -     Insert peripheral IV; Standing  -     ceFAZolin (ANCEF) 2,000 mg in dextrose 5 % 100 mL IVPB          Subjective:      Patient ID: Santo Feliz is a 34 y o  male  Patient is a 34year old male with symptoms of right foot pain  Pain is severe and sharp in quality  Pain distribution is calf and foot  Pain is worse with movement any touch  Pain is improved by recent stimulation  The pain is long standing  Maybe related to a knee surgery in the past  The patient underwent a successful dorsal root ganglion trial with 60-70 % relief of pain  They reported improvement in ambulation and rest  They are ready to proceed with surgery  The following portions of the patient's history were reviewed and updated as appropriate:   He  has a past medical history of Intervertebral disc disorder with radiculopathy of lumbosacral region (4/24/2019)  He   Patient Active Problem List    Diagnosis Date Noted    Radiculopathy, lumbar region     Intervertebral disc disorder with radiculopathy of lumbosacral region 04/24/2019    Chronic pain syndrome 03/15/2019    Complex regional pain syndrome type 1 of right lower extremity 09/04/2018    Right foot pain 07/03/2018    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[facility] [address] ) 07/03/2018    Edema 05/03/2018    Arthralgia 05/03/2018    Weight loss 05/03/2018    Insomnia 03/05/2018    Anxiety 02/02/2018    Raynaud phenomenon 11/30/2017    Prolonged QT interval 02/02/2017    Left-sided chest wall pain 04/06/2016    Constipation 01/04/2016    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 12/23/2015     He  has a past surgical history that includes Arthroscopic repair ACL (Right) and Rotator cuff repair (Right)  His family history includes No Known Problems in his father and mother  He  reports that he has been smoking  He uses smokeless tobacco  He reports that he does not drink alcohol or use drugs  Current Outpatient Medications   Medication Sig Dispense Refill    ALPRAZolam (XANAX) 2 MG tablet 2 bid, 1 qhs 150 tablet 3    hydrOXYzine pamoate (VISTARIL) 25 mg capsule Take 1 capsule (25 mg total) by mouth every 4 (four) hours as needed for itching or anxiety 100 capsule 5    mexiletine (MEXITIL) 150 mg capsule Take 150 mg by mouth Three times a day      diclofenac (VOLTAREN) 75 mg EC tablet Take 1 tablet (75 mg total) by mouth 2 (two) times a day 60 tablet 0     No current facility-administered medications for this visit        Current Outpatient Medications on File Prior to Visit   Medication Sig    ALPRAZolam (XANAX) 2 MG tablet 2 bid, 1 qhs    hydrOXYzine pamoate (VISTARIL) 25 mg capsule Take 1 capsule (25 mg total) by mouth every 4 (four) hours as needed for itching or anxiety    mexiletine (MEXITIL) 150 mg capsule Take 150 mg by mouth Three times a day    diclofenac (VOLTAREN) 75 mg EC tablet Take 1 tablet (75 mg total) by mouth 2 (two) times a day    [DISCONTINUED] hydrOXYzine HCL (ATARAX) 25 mg tablet Take 1 tablet (25 mg total) by mouth every 6 (six) hours as needed for anxiety     No current facility-administered medications on file prior to visit  He has No Known Allergies       Review of Systems   Constitutional: Positive for fatigue  Feeling poorly    HENT: Negative  Eyes: Negative  Respiratory: Negative  Cardiovascular: Negative  Gastrointestinal: Negative for nausea (due to increase of pain )  Endocrine: Negative  Genitourinary: Negative  Musculoskeletal: Positive for back pain (right side lower back radiates into right buttock, hip, and down right leg )  Right foot CRPS  With discoloration   Skin: Positive for color change (right foot)  Allergic/Immunologic: Negative  Neurological: Positive for weakness (right leg ) and headaches (lack of sleep due to pain)  Negative for dizziness, seizures, syncope and numbness  Hematological: Bruises/bleeds easily  Psychiatric/Behavioral: Positive for sleep disturbance (due to pain )  Negative for confusion  Objective:      /72 (BP Location: Left arm)   Pulse 72   Temp 98 3 °F (36 8 °C) (Tympanic)   Resp 16   Ht 6' 1 5" (1 867 m)   Wt 74 4 kg (164 lb)   BMI 21 34 kg/m²          Physical Exam   Constitutional: He is oriented to person, place, and time  He appears well-developed  No distress  HENT:   Head: Normocephalic and atraumatic  Nose: Nose normal    Eyes: Pupils are equal, round, and reactive to light  Conjunctivae and EOM are normal  Right eye exhibits no discharge  Left eye exhibits no discharge  No scleral icterus  Neck: Normal range of motion  Neck supple  No JVD present   No tracheal deviation present  No thyromegaly present  Cardiovascular: Normal rate  Pulmonary/Chest: Effort normal and breath sounds normal  No stridor  No respiratory distress  He has no wheezes  Abdominal: Soft  Neurological: He is alert and oriented to person, place, and time  He has normal strength  No cranial nerve deficit or sensory deficit  Pain limited PF RLE   Skin: Skin is warm and dry  No rash noted  He is not diaphoretic  No erythema  Psychiatric: He has a normal mood and affect   His behavior is normal  Judgment and thought content normal

## 2019-07-08 NOTE — LETTER
July 8, 2019     Brittani Daley MD  [address] [facility]    Patient: Nilesh Small   YOB: 1990   Date of Visit: 7/8/2019       Dear Dr Beryl Kinsey:    Thank you for referring Yolanda Adler to me for evaluation  Below are my notes for this consultation  If you have questions, please do not hesitate to call me  I look forward to following your patient along with you  Sincerely,        Mukesh Pascal MD        CC: No Recipients  Mukesh Pascal MD  7/8/2019  2:24 PM  Sign at close encounter  Assessment/Plan:    No problem-specific Assessment & Plan notes found for this encounter  Patient is stable  Symptoms, as detailed in HPI, continue to significantly impact of patient's quality of life in daily activities  After carefully considering presentation, investigations, functional status and co-morbidities, the risk/benefit profile of surgical intervention is favorable  History, physical examination and diagnostic tests were reviewed and questions answered  Diagnosis, care plan and treatment options were discussed  The patient understand instructions and will follow up as directed  Patient with complex region pain syndrome of the right foot  Successful dorsal root ganglion trial right L5, S1  We will proceed with permanent implantation     Expected postoperative course, including activity restrictions, expected pain and postoperative medication were reviewed  Patient provided verbal consent to surgical procedure and signed consent form: Yes    We also discussed the risks and benefits of the procedure the risks being including: infection (~2%), neurologic injury (<1%), new pain, revisions surgery, failure to relieve pain  The benefits including comparable relief as during the trial  The patient stated understanding of the risks and benefits and agreed to proceed       IMAGING REVIEWED: MRI lumbar shows no obvious limiting foraminal stenosis       Diagnoses and all orders for this visit:    Chronic pain syndrome  -     Ambulatory referral to Neurosurgery  -     UA w Reflex to Microscopic w Reflex to Culture  -     Comprehensive metabolic panel; Future  -     CBC and differential; Future  -     APTT; Future  -     Protime-INR; Future  -     HEMOGLOBIN A1C W/ EAG ESTIMATION; Future  -     Case request operating room: PLACEMENT DORSAL ROOT GANGLION (DRG) ELECTRODE RIGHT L5, S1 W IMPLANTABLE PULSE GENERATOR, RIGHT; Standing  -     Case request operating room: PLACEMENT DORSAL ROOT GANGLION (DRG) ELECTRODE RIGHT L5, S1 W IMPLANTABLE PULSE GENERATOR, RIGHT    Complex regional pain syndrome type 1 of right lower extremity  -     Ambulatory referral to Neurosurgery  -     UA w Reflex to Microscopic w Reflex to Culture  -     Comprehensive metabolic panel; Future  -     CBC and differential; Future  -     APTT; Future  -     Protime-INR; Future  -     HEMOGLOBIN A1C W/ EAG ESTIMATION; Future  -     Case request operating room: 1 [facility]  (DRG) ELECTRODE RIGHT L5, S1 W IMPLANTABLE PULSE GENERATOR, RIGHT; Standing  -     Case request operating room: PLACEMENT DORSAL ROOT GANGLION (DRG) ELECTRODE RIGHT L5, S1 W IMPLANTABLE PULSE GENERATOR, RIGHT    Other orders  -     mexiletine (MEXITIL) 150 mg capsule; Take 150 mg by mouth Three times a day  -     Diet NPO; Sips with meds; Standing  -     Nursing Communication [address] [place] [address] Interventions Implemented; Standing  -     Nursing Communication Use 2 CHG cloths, have the patient wash his/her body from the neck down or have staff wash entire body (from neck down) if patient is unable; Standing  -     Nursing Communication Swab both nares with Povidone-Iodine solution, EXCLUDE if patient has shellfish/Iodine allergy; Standing  -     chlorhexidine (PERIDEX) 0 12 % oral rinse 15 mL  -     Void on call to OR; Standing  -     Insert peripheral IV;  Standing  -     ceFAZolin (ANCEF) 2,000 mg in dextrose 5 % 100 mL IVPB Subjective:      Patient ID: Angie Whitney is a 34 y o  male  Patient is a 34year old male with symptoms of right foot pain  Pain is severe and sharp in quality  Pain distribution is calf and foot  Pain is worse with movement any touch  Pain is improved by recent stimulation  The pain is long standing  Maybe related to a knee surgery in the past  The patient underwent a successful dorsal root ganglion trial with 60-70 % relief of pain  They reported improvement in ambulation and rest  They are ready to proceed with surgery  The following portions of the patient's history were reviewed and updated as appropriate:   He  has a past medical history of Intervertebral disc disorder with radiculopathy of lumbosacral region (4/24/2019)  He   Patient Active Problem List    Diagnosis Date Noted    Radiculopathy, lumbar region     Intervertebral disc disorder with radiculopathy of lumbosacral region 04/24/2019    Chronic pain syndrome 03/15/2019    Complex regional pain syndrome type 1 of right lower extremity 09/04/2018    Right foot pain 07/03/2018    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[address] ) 07/03/2018    Edema 05/03/2018    Arthralgia 05/03/2018    Weight loss 05/03/2018    Insomnia 03/05/2018    Anxiety 02/02/2018    Raynaud phenomenon 11/30/2017    Prolonged QT interval 02/02/2017    Left-sided chest wall pain 04/06/2016    Constipation 01/04/2016    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 12/23/2015     He  has a past surgical history that includes Arthroscopic repair ACL (Right) and Rotator cuff repair (Right)  His family history includes No Known Problems in his father and mother  He  reports that he has been smoking  He uses smokeless tobacco  He reports that he does not drink alcohol or use drugs    Current Outpatient Medications   Medication Sig Dispense Refill    ALPRAZolam (XANAX) 2 MG tablet 2 bid, 1 qhs 150 tablet 3    hydrOXYzine pamoate (VISTARIL) 25 mg capsule Take 1 capsule (25 mg total) by mouth every 4 (four) hours as needed for itching or anxiety 100 capsule 5    mexiletine (MEXITIL) 150 mg capsule Take 150 mg by mouth Three times a day      diclofenac (VOLTAREN) 75 mg EC tablet Take 1 tablet (75 mg total) by mouth 2 (two) times a day 60 tablet 0     No current facility-administered medications for this visit  Current Outpatient Medications on File Prior to Visit   Medication Sig    ALPRAZolam (XANAX) 2 MG tablet 2 bid, 1 qhs    hydrOXYzine pamoate (VISTARIL) 25 mg capsule Take 1 capsule (25 mg total) by mouth every 4 (four) hours as needed for itching or anxiety    mexiletine (MEXITIL) 150 mg capsule Take 150 mg by mouth Three times a day    diclofenac (VOLTAREN) 75 mg EC tablet Take 1 tablet (75 mg total) by mouth 2 (two) times a day    [DISCONTINUED] hydrOXYzine HCL (ATARAX) 25 mg tablet Take 1 tablet (25 mg total) by mouth every 6 (six) hours as needed for anxiety     No current facility-administered medications on file prior to visit  He has No Known Allergies       Review of Systems   Constitutional: Positive for fatigue  Feeling poorly    HENT: Negative  Eyes: Negative  Respiratory: Negative  Cardiovascular: Negative  Gastrointestinal: Negative for nausea (due to increase of pain )  Endocrine: Negative  Genitourinary: Negative  Musculoskeletal: Positive for back pain (right side lower back radiates into right buttock, hip, and down right leg )  Right foot CRPS  With discoloration   Skin: Positive for color change (right foot)  Allergic/Immunologic: Negative  Neurological: Positive for weakness (right leg ) and headaches (lack of sleep due to pain)  Negative for dizziness, seizures, syncope and numbness  Hematological: Bruises/bleeds easily  Psychiatric/Behavioral: Positive for sleep disturbance (due to pain )  Negative for confusion           Objective:      /72 (BP Location: Left arm)   Pulse 72   Temp 98 3 °F (36 8 °C) (Tympanic)   Resp 16   Ht 6' 1 5" (1 867 m)   Wt 74 4 kg (164 lb)   BMI 21 34 kg/m²           Physical Exam   Constitutional: He is oriented to person, place, and time  He appears well-developed  No distress  HENT:   Head: Normocephalic and atraumatic  Nose: Nose normal    Eyes: Pupils are equal, round, and reactive to light  Conjunctivae and EOM are normal  Right eye exhibits no discharge  Left eye exhibits no discharge  No scleral icterus  Neck: Normal range of motion  Neck supple  No JVD present  No tracheal deviation present  No thyromegaly present  Cardiovascular: Normal rate  Pulmonary/Chest: Effort normal and breath sounds normal  No stridor  No respiratory distress  He has no wheezes  Abdominal: Soft  Neurological: He is alert and oriented to person, place, and time  He has normal strength  No cranial nerve deficit or sensory deficit  Pain limited PF RLE   Skin: Skin is warm and dry  No rash noted  He is not diaphoretic  No erythema  Psychiatric: He has a normal mood and affect   His behavior is normal  Judgment and thought content normal

## 2019-07-09 NOTE — PRE-PROCEDURE INSTRUCTIONS
Pre-Surgery Instructions:   Medication Instructions    ALPRAZolam (XANAX) 2 MG tablet Instructed patient per Anesthesia Guidelines   hydrOXYzine pamoate (VISTARIL) 25 mg capsule Instructed patient per Anesthesia Guidelines   mexiletine (MEXITIL) 150 mg capsule Instructed patient per Anesthesia Guidelines  Before your operation, you play an important role in decreasing your risk for infection by washing with special antiseptic soap  This is an effective way to reduce bacteria on the skin which may help to prevent infections at the surgical site  Please read the following directions in advance  1  In the week before your operation purchase a 4 ounce bottle of antiseptic soap containing chlorhexidine gluconate 4%  Some brand names include: Aplicare, Endure, and Hibiclens  The cost is usually less than $5 00  · For your convenience, the [employer] carries the soap  · It may also be available at your doctor's office or pre-admission testing center, and at most retail pharmacies  · If you are allergic or sensitive to soaps containing chlorhexidine gluconate (CHG), please let your doctor know so another antiseptic soap can be suggested  · CHG antiseptic soap is for external use only  2      The day before your operation follow these directions carefully to get ready  · Place clean lines (sheets) on your bed; you should sleep on clean sheets after your evening shower  · Get clean towels and washcloths ready - you need enough for 2 showers  · Set aside clean underwear, pajamas, and clothing to wear after the shower  Reminders:  · DO NOT use any other soap or body rinse on your skin during or after the antiseptic showers  · DO NOT use lotion , powder, deodorant, or perfume/aftershave of any kind on your skin after your antiseptic shower  · DO NOT shave any body parts in the 24 hours/the day before your operation    · DO NOT get the antiseptic soap in your eyes, ears, nose, mouth, or vaginal area  3      You will need to shower the night before AND the morning of your Surgery  Shower 1:  · The evening before your operation, take the fist shower  · First, shampoo your hair with regular shampoo and rinse it completely before you use the anitseptic soap  After washing and rinsing your hair, rinse your body  · Next, use a clean wash cloth to apply the antiseptic soap and wash your body from the neck down to your toes using 1/2 bottle of the antiseptic soap  You will use the other 1/2 bottle for the second shower  · Clean the area where your incision will be; later this area well for about 2 minutes  · If you ar having head or neck surgery, wash areas with the antiseptic soap  · Rinse yourself completely with running water  · Use a clean towel to dry off  · Wear clean underwear and clothing/pajamas  Shower 2:  · The Morning of your operation, take the second shower following the same steps as Shower 1 using the second 1/2 of the bottle of antiseptic soap  · Use clean cloths and towels to was and dry yourself off  · Wear clean underwear and clothing

## 2019-07-11 ENCOUNTER — DOCUMENTATION (OUTPATIENT)
Dept: NEUROSURGERY | Facility: CLINIC | Age: 29
End: 2019-07-11

## 2019-07-11 NOTE — TELEPHONE ENCOUNTER
Telephoned patient verified he P/U medications ordered 7 8  Hygiene care completed per protocol  Anticipating surgery tomorrow    Preoperative call protocol followed as per documentation on 7/8/2019

## 2019-07-12 ENCOUNTER — ANESTHESIA (OUTPATIENT)
Dept: PERIOP | Facility: HOSPITAL | Age: 29
End: 2019-07-12
Payer: COMMERCIAL

## 2019-07-12 ENCOUNTER — HOSPITAL ENCOUNTER (OUTPATIENT)
Facility: HOSPITAL | Age: 29
Setting detail: OUTPATIENT SURGERY
Discharge: HOME | End: 2019-07-12
Attending: NEUROLOGICAL SURGERY | Admitting: NEUROLOGICAL SURGERY
Payer: COMMERCIAL

## 2019-07-12 ENCOUNTER — ANESTHESIA EVENT (OUTPATIENT)
Dept: PERIOP | Facility: HOSPITAL | Age: 29
End: 2019-07-12
Payer: COMMERCIAL

## 2019-07-12 ENCOUNTER — APPOINTMENT (OUTPATIENT)
Dept: RADIOLOGY | Facility: HOSPITAL | Age: 29
End: 2019-07-12
Payer: COMMERCIAL

## 2019-07-12 VITALS
SYSTOLIC BLOOD PRESSURE: 124 MMHG | TEMPERATURE: 97.6 F | HEIGHT: 73 IN | WEIGHT: 158.38 LBS | BODY MASS INDEX: 20.99 KG/M2 | RESPIRATION RATE: 20 BRPM | OXYGEN SATURATION: 99 % | DIASTOLIC BLOOD PRESSURE: 60 MMHG | HEART RATE: 66 BPM

## 2019-07-12 DIAGNOSIS — G90.521 COMPLEX REGIONAL PAIN SYNDROME TYPE 1 OF RIGHT LOWER EXTREMITY: Primary | ICD-10-CM

## 2019-07-12 PROCEDURE — C1778 LEAD, NEUROSTIMULATOR: HCPCS | Performed by: NEUROLOGICAL SURGERY

## 2019-07-12 PROCEDURE — 63685 INS/RPLC SPI NPG/RCVR POCKET: CPT | Performed by: NEUROLOGICAL SURGERY

## 2019-07-12 PROCEDURE — 63650 IMPLANT NEUROELECTRODES: CPT | Performed by: NEUROLOGICAL SURGERY

## 2019-07-12 PROCEDURE — 72020 X-RAY EXAM OF SPINE 1 VIEW: CPT

## 2019-07-12 PROCEDURE — C1787 PATIENT PROGR, NEUROSTIM: HCPCS | Performed by: NEUROLOGICAL SURGERY

## 2019-07-12 PROCEDURE — C1767 GENERATOR, NEURO NON-RECHARG: HCPCS | Performed by: NEUROLOGICAL SURGERY

## 2019-07-12 DEVICE — DRG IMPLANT LEAD KIT
Type: IMPLANTABLE DEVICE | Status: FUNCTIONAL
Brand: SLIMTIP™

## 2019-07-12 DEVICE — IMPLANTABLE PULSE GENERATOR
Type: IMPLANTABLE DEVICE | Status: FUNCTIONAL
Brand: PROCLAIM™

## 2019-07-12 RX ORDER — ONDANSETRON 2 MG/ML
INJECTION INTRAMUSCULAR; INTRAVENOUS AS NEEDED
Status: DISCONTINUED | OUTPATIENT
Start: 2019-07-12 | End: 2019-07-12 | Stop reason: SURG

## 2019-07-12 RX ORDER — FENTANYL CITRATE/PF 50 MCG/ML
25 SYRINGE (ML) INJECTION
Status: DISCONTINUED | OUTPATIENT
Start: 2019-07-12 | End: 2019-07-12 | Stop reason: HOSPADM

## 2019-07-12 RX ORDER — OXYCODONE HYDROCHLORIDE AND ACETAMINOPHEN 5; 325 MG/1; MG/1
1 TABLET ORAL EVERY 4 HOURS PRN
Status: DISCONTINUED | OUTPATIENT
Start: 2019-07-12 | End: 2019-07-12 | Stop reason: HOSPADM

## 2019-07-12 RX ORDER — CEFAZOLIN SODIUM 2 G/50ML
2000 SOLUTION INTRAVENOUS ONCE
Status: COMPLETED | OUTPATIENT
Start: 2019-07-12 | End: 2019-07-12

## 2019-07-12 RX ORDER — CHLORHEXIDINE GLUCONATE 0.12 MG/ML
15 RINSE ORAL ONCE
Status: COMPLETED | OUTPATIENT
Start: 2019-07-12 | End: 2019-07-12

## 2019-07-12 RX ORDER — CYCLOBENZAPRINE HCL 10 MG
10 TABLET ORAL 3 TIMES DAILY PRN
Qty: 20 TABLET | Refills: 0 | Status: SHIPPED | OUTPATIENT
Start: 2019-07-12 | End: 2019-07-24 | Stop reason: SDUPTHER

## 2019-07-12 RX ORDER — METOCLOPRAMIDE HYDROCHLORIDE 5 MG/ML
10 INJECTION INTRAMUSCULAR; INTRAVENOUS ONCE AS NEEDED
Status: DISCONTINUED | OUTPATIENT
Start: 2019-07-12 | End: 2019-07-12 | Stop reason: HOSPADM

## 2019-07-12 RX ORDER — SODIUM CHLORIDE, SODIUM LACTATE, POTASSIUM CHLORIDE, CALCIUM CHLORIDE 600; 310; 30; 20 MG/100ML; MG/100ML; MG/100ML; MG/100ML
75 INJECTION, SOLUTION INTRAVENOUS CONTINUOUS
Status: DISCONTINUED | OUTPATIENT
Start: 2019-07-12 | End: 2019-07-12 | Stop reason: HOSPADM

## 2019-07-12 RX ORDER — PROPOFOL 10 MG/ML
INJECTION, EMULSION INTRAVENOUS AS NEEDED
Status: DISCONTINUED | OUTPATIENT
Start: 2019-07-12 | End: 2019-07-12 | Stop reason: SURG

## 2019-07-12 RX ORDER — SUCCINYLCHOLINE/SOD CL,ISO/PF 100 MG/5ML
SYRINGE (ML) INTRAVENOUS AS NEEDED
Status: DISCONTINUED | OUTPATIENT
Start: 2019-07-12 | End: 2019-07-12 | Stop reason: SURG

## 2019-07-12 RX ORDER — HYDROMORPHONE HCL/PF 1 MG/ML
0.5 SYRINGE (ML) INJECTION
Status: DISCONTINUED | OUTPATIENT
Start: 2019-07-12 | End: 2019-07-12 | Stop reason: HOSPADM

## 2019-07-12 RX ORDER — ONDANSETRON 2 MG/ML
4 INJECTION INTRAMUSCULAR; INTRAVENOUS ONCE AS NEEDED
Status: DISCONTINUED | OUTPATIENT
Start: 2019-07-12 | End: 2019-07-12 | Stop reason: HOSPADM

## 2019-07-12 RX ORDER — FENTANYL CITRATE 50 UG/ML
INJECTION, SOLUTION INTRAMUSCULAR; INTRAVENOUS AS NEEDED
Status: DISCONTINUED | OUTPATIENT
Start: 2019-07-12 | End: 2019-07-12 | Stop reason: SURG

## 2019-07-12 RX ORDER — ONDANSETRON 2 MG/ML
4 INJECTION INTRAMUSCULAR; INTRAVENOUS EVERY 6 HOURS PRN
Status: DISCONTINUED | OUTPATIENT
Start: 2019-07-12 | End: 2019-07-12 | Stop reason: HOSPADM

## 2019-07-12 RX ORDER — PROPOFOL 10 MG/ML
INJECTION, EMULSION INTRAVENOUS CONTINUOUS PRN
Status: DISCONTINUED | OUTPATIENT
Start: 2019-07-12 | End: 2019-07-12 | Stop reason: SURG

## 2019-07-12 RX ORDER — MIDAZOLAM HYDROCHLORIDE 1 MG/ML
INJECTION INTRAMUSCULAR; INTRAVENOUS AS NEEDED
Status: DISCONTINUED | OUTPATIENT
Start: 2019-07-12 | End: 2019-07-12 | Stop reason: SURG

## 2019-07-12 RX ORDER — BUPIVACAINE HYDROCHLORIDE 2.5 MG/ML
INJECTION, SOLUTION INFILTRATION; PERINEURAL AS NEEDED
Status: DISCONTINUED | OUTPATIENT
Start: 2019-07-12 | End: 2019-07-12 | Stop reason: HOSPADM

## 2019-07-12 RX ORDER — LIDOCAINE HYDROCHLORIDE AND EPINEPHRINE 10; 10 MG/ML; UG/ML
INJECTION, SOLUTION INFILTRATION; PERINEURAL AS NEEDED
Status: DISCONTINUED | OUTPATIENT
Start: 2019-07-12 | End: 2019-07-12 | Stop reason: HOSPADM

## 2019-07-12 RX ORDER — ROCURONIUM BROMIDE 10 MG/ML
INJECTION, SOLUTION INTRAVENOUS AS NEEDED
Status: DISCONTINUED | OUTPATIENT
Start: 2019-07-12 | End: 2019-07-12 | Stop reason: SURG

## 2019-07-12 RX ORDER — GLYCOPYRROLATE 0.2 MG/ML
INJECTION INTRAMUSCULAR; INTRAVENOUS AS NEEDED
Status: DISCONTINUED | OUTPATIENT
Start: 2019-07-12 | End: 2019-07-12 | Stop reason: SURG

## 2019-07-12 RX ADMIN — FENTANYL CITRATE 50 MCG: 50 INJECTION, SOLUTION INTRAMUSCULAR; INTRAVENOUS at 10:19

## 2019-07-12 RX ADMIN — CEFAZOLIN SODIUM 2000 MG: 2 SOLUTION INTRAVENOUS at 10:30

## 2019-07-12 RX ADMIN — ONDANSETRON 4 MG: 2 INJECTION INTRAMUSCULAR; INTRAVENOUS at 12:16

## 2019-07-12 RX ADMIN — OXYCODONE HYDROCHLORIDE AND ACETAMINOPHEN 1 TABLET: 5; 325 TABLET ORAL at 13:49

## 2019-07-12 RX ADMIN — HYDROMORPHONE HYDROCHLORIDE 0.5 MG: 1 INJECTION, SOLUTION INTRAMUSCULAR; INTRAVENOUS; SUBCUTANEOUS at 13:08

## 2019-07-12 RX ADMIN — SODIUM CHLORIDE, SODIUM LACTATE, POTASSIUM CHLORIDE, AND CALCIUM CHLORIDE 75 ML/HR: .6; .31; .03; .02 INJECTION, SOLUTION INTRAVENOUS at 09:17

## 2019-07-12 RX ADMIN — PROPOFOL 120 MCG/KG/MIN: 10 INJECTION, EMULSION INTRAVENOUS at 10:24

## 2019-07-12 RX ADMIN — Medication 100 MG: at 10:23

## 2019-07-12 RX ADMIN — PROPOFOL 300 MG: 10 INJECTION, EMULSION INTRAVENOUS at 10:23

## 2019-07-12 RX ADMIN — SODIUM CHLORIDE, SODIUM LACTATE, POTASSIUM CHLORIDE, AND CALCIUM CHLORIDE: .6; .31; .03; .02 INJECTION, SOLUTION INTRAVENOUS at 12:35

## 2019-07-12 RX ADMIN — GLYCOPYRROLATE 0.2 MG: 0.2 INJECTION, SOLUTION INTRAMUSCULAR; INTRAVENOUS at 11:02

## 2019-07-12 RX ADMIN — ROCURONIUM BROMIDE 10 MG: 10 INJECTION, SOLUTION INTRAVENOUS at 10:19

## 2019-07-12 RX ADMIN — FENTANYL CITRATE 50 MCG: 50 INJECTION, SOLUTION INTRAMUSCULAR; INTRAVENOUS at 10:18

## 2019-07-12 RX ADMIN — GLYCOPYRROLATE 0.2 MG: 0.2 INJECTION, SOLUTION INTRAMUSCULAR; INTRAVENOUS at 10:18

## 2019-07-12 RX ADMIN — FENTANYL CITRATE 25 MCG: 50 INJECTION, SOLUTION INTRAMUSCULAR; INTRAVENOUS at 12:59

## 2019-07-12 RX ADMIN — FENTANYL CITRATE 25 MCG: 50 INJECTION, SOLUTION INTRAMUSCULAR; INTRAVENOUS at 12:46

## 2019-07-12 RX ADMIN — MIDAZOLAM 2 MG: 1 INJECTION INTRAMUSCULAR; INTRAVENOUS at 10:18

## 2019-07-12 RX ADMIN — FENTANYL CITRATE 25 MCG: 50 INJECTION, SOLUTION INTRAMUSCULAR; INTRAVENOUS at 12:54

## 2019-07-12 RX ADMIN — CHLORHEXIDINE GLUCONATE 0.12% ORAL RINSE 15 ML: 1.2 LIQUID ORAL at 09:17

## 2019-07-12 NOTE — DISCHARGE INSTRUCTIONS
Discharge Instructions      Activity:  1  Do not lift more than 10 pounds for 2 weeks  2  Avoid bending, lifting and twisting for 2weeks  3  No strenuous activities  No driving for 2 weeks  4  When able to shower, continue to use clean towel and washcloth for 2 weeks post-op  5  Continue to change bed linens and pajamas more frequently  Wear clean clothes daily  Surgical incision care:  1  If dressing present, keep in place for 3 days  2  Keep incision dry for 3 days  3  May shower with mild antimicrobial soap after 3 days  4  After 3 days, incisions may be left open to air, but must remain clean  5  Do not immerse the incisions in water for 6 weeks  6  Do not apply any creams or ointments to the incision for 6 weeks, unless otherwise instructed by [facility]  7  Contact office if increasing redness, drainage, pain or swelling around the incisions  Postoperative medication:  1  Complete course of antibiotic as directed  2  [facility] will provide pain medication as coordinated with your pain specialist  All prescriptions must come from a single provider  a  Take all medications as prescribed  Call office with any questions/concerns  3  Please contact office for questions regarding dosage and modifications  4  No antiplatelet, anticoagulation or Nonsteroidal anti-inflammatory (NSAIDs) medication until cleared by [email or web address], unless otherwise instructed  5  Do not operate heavy machinery or vehicles while taking sedating medications  6  Use a bowel regimen while on opioids as they induce constipation  Ie  Senokot-S, Miralax, Colace, etc  Increase fiber and water intake

## 2019-07-12 NOTE — ANESTHESIA POSTPROCEDURE EVALUATION
Post-Op Assessment Note    CV Status:  Stable  Pain Score: 8    Pain management: inadequate     Mental Status:  Alert and awake   Hydration Status:  Euvolemic   PONV Controlled:  Controlled   Airway Patency:  Patent  Airway: intubated   Post Op Vitals Reviewed: Yes      Staff: Anesthesiologist, with CRNAs   Comments: Awake on 3L NC   Pt C/O incisional pain to be medicated by PACU nurse          /77 (07/12/19 1240)    Temp 97 7 °F (36 5 °C) (07/12/19 1240)    Pulse 59 (07/12/19 1240)   Resp   16   SpO2   99

## 2019-07-12 NOTE — INTERVAL H&P NOTE
H&P reviewed  After examining the patient I find no changes in the patients condition since the H&P had been written    /64   Pulse 55   Temp 98 3 °F (36 8 °C) (Oral)   Resp 16   Ht 6' 1" (1 854 m)   Wt 71 8 kg (158 lb 6 oz)   SpO2 100%   BMI 20 90 kg/m²

## 2019-07-12 NOTE — ANESTHESIA PREPROCEDURE EVALUATION
Review of Systems/Medical History  Patient summary reviewed        Cardiovascular  Negative cardio ROS    Pulmonary  Negative pulmonary ROS        GI/Hepatic  Negative GI/hepatic ROS          Negative  ROS        Endo/Other  Negative endo/other ROS      GYN  Negative gynecology ROS          Hematology  Negative hematology ROS      Musculoskeletal  Negative musculoskeletal ROS        Neurology    Neuromuscular disease ,   Comment: crps Psychology   Anxiety,              Physical Exam    Airway    Mallampati score: I  TM Distance: >3 FB  Neck ROM: full     Dental   No notable dental hx     Cardiovascular  Comment: Negative ROS, Rhythm: regular, Rate: normal, Cardiovascular exam normal    Pulmonary  Pulmonary exam normal     Other Findings        Anesthesia Plan  ASA Score- 2     Anesthesia Type- general with ASA Monitors  Additional Monitors:   Airway Plan: ETT  Plan Factors- Patient instructed to abstain from smoking on day of procedure  Patient did not smoke on day of surgery  Induction- intravenous  Postoperative Plan- Plan for postoperative opioid use  Informed Consent- Anesthetic plan and risks discussed with patient  I personally reviewed this patient with the CRNA  Discussed and agreed on the Anesthesia Plan with the CRNA  Stephen Lopez

## 2019-07-12 NOTE — OP NOTE
OPERATIVE REPORT  PATIENT NAME: Liliana Howard    :  1990  MRN: [id number]  Pt Location: [facility] OR ROOM 01    SURGERY DATE: 2019    Surgeon(s) and Role:     * Ana Daily MD - Primary    Preop Diagnosis:  Chronic pain syndrome [G89 4]  Complex regional pain syndrome type 1 of right lower extremity [G90 521]    Post-Op Diagnosis Codes:     * Chronic pain syndrome [G89 4]     * Complex regional pain syndrome type 1 of right lower extremity [G90 521]    Procedure(s) (LRB):  PLACEMENT DORSAL ROOT GANGLION (DRG) ELECTRODE RIGHT L5, S1 W IMPLANTABLE PULSE GENERATOR, RIGHT (Right)    Specimen(s):  * No specimens in log *    Estimated Blood Loss:   Minimal    Drains:  * No LDAs found *    Anesthesia Type:   General    Operative Indications:  Chronic pain syndrome [G89 4]  Complex regional pain syndrome type 1 of right lower extremity [G90 521]      Operative Findings:  See dictated note    Complications:   None    Procedure and Technique:  The patient was taken to operative theater induced under general anesthesia and intubated he was positioned prone a Willem frame  The sweet spot during the trial was planned at L5 and S1 right DRG  An right buttock incision was planned for the generator he was prepped and draped in sterile fashion  We used an epidmed needle to gain epidural access via loss of resistance technique  We traveled two electrodes through two different epidural access needles up to towards approximately the dorsal root ganglion of L5 and S1 on the right  We confirmed placement of the the leads on fluoro using both lateral and AP images  We created the appropriate loops as anchors  The pocket on the right buttock was created with an knife and electrocautery  We tunneled that electrodes connected to the impulse generator and this was connected  We then tested impedances which were normal     After all hardware was in place we irrigated each wound    And then closed in layers using 2 0 Vicryl for the subcutaneous tissues and monocryl for the skin sterile dressing was placed  Patient was repositioned supine the hospital bed extubated to room air  he was taken to the postop recovery area stable condition  All needle and sponge counts were correct at the procedure  All neuromonitoring remained stable during the procedure      I was present for the entire procedure    Patient Disposition:  PACU     SIGNATURE: Otilia Roth MD  DATE: July 12, 2019  TIME: 12:25 PM    [facility]  Slimtip lead x2

## 2019-07-15 ENCOUNTER — TELEPHONE (OUTPATIENT)
Dept: NEUROSURGERY | Facility: CLINIC | Age: 29
End: 2019-07-15

## 2019-07-15 NOTE — TELEPHONE ENCOUNTER
Called Oz Hatch to follow up on patient POD 3  Patient reports that he is doing very well overall and denies any incisional issues or fevers  Patient denies any bleeding, dizziness, fever, redness, significant pain and swelling  Pain is much better controlled today and he is only experiencing expected surgical pain at this time  Verified date/time/location of his upcoming POV and advised him to call the office with any further questions or concerns, or if any incisional issues or fevers would arise  Patient received and does understand the discharge instructions  Reviewed incision care with Patient and he has no further questions at this time  Patient was appreciative for the call

## 2019-07-22 ENCOUNTER — CLINICAL SUPPORT (OUTPATIENT)
Dept: NEUROSURGERY | Facility: CLINIC | Age: 29
End: 2019-07-22

## 2019-07-22 VITALS
BODY MASS INDEX: 20.85 KG/M2 | WEIGHT: 158 LBS | SYSTOLIC BLOOD PRESSURE: 118 MMHG | TEMPERATURE: 98.7 F | DIASTOLIC BLOOD PRESSURE: 72 MMHG

## 2019-07-22 DIAGNOSIS — Z98.890 POST-OPERATIVE STATE: Primary | ICD-10-CM

## 2019-07-22 PROCEDURE — 99024 POSTOP FOLLOW-UP VISIT: CPT

## 2019-07-22 NOTE — PROGRESS NOTES
Post-Op Visit-Neurosurgery    Oralia Guillen 34 y o  male MRN: [id number]    Chief Complaint  Patient presents post: [address] [facility] [address] (DRG) ELECTRODE RIGHT L5, S1 W IMPLANTABLE PULSE GENERATOR, RIGHT on 7/12/19 by Dr Kingston Query    History of Present Illness  Patient presents for 2 week POV for incision check  Patient reports he is doing okay overall  He states his post operative pain has improved  The rep did program his generator today  Patient is hopeful his pain will continue to improve  He is not taking oxycodone anymore  He denies any incisional issues  Assessment  Wound Exam: Incision is well approximated  No redness, swelling, drainage, or dehiscence  location: Right upper buttock  Complications: None  Incisions NINA  See image below  Discussion/Summary  Reviewed incision care with patient including daily observation for s/s infection including: increased erythema, edema, drainage, dehiscence of incision or fever >101  Should these be observed, he understands that he is to call and/or return immediately for reassessment  Advised patient to continue cleansing area with mild soap and water and pat dry  Not to apply any lotions, creams, or ointments, & not to submerge in any water for two more weeks  He is to maintain activity restrictions until cleared by the surgeon  Activity levels were also reviewed with the patient in detail, he is to lift no greater than 10 pounds, advised to limit bend/twisting at the waist and ambulation is encouraged as tolerated  Verified date/time/location of upcoming POV on 8/23/2019  He is to call the office with any further questions or concerns, or if any incisional issues or fevers would arise  rep Yanira from [facility], saw the patient at today's visit and performed programming  Programming report attached

## 2019-07-24 DIAGNOSIS — G90.521 COMPLEX REGIONAL PAIN SYNDROME TYPE 1 OF RIGHT LOWER EXTREMITY: ICD-10-CM

## 2019-07-24 RX ORDER — CYCLOBENZAPRINE HCL 10 MG
10 TABLET ORAL 3 TIMES DAILY PRN
Qty: 60 TABLET | Refills: 0 | Status: SHIPPED | OUTPATIENT
Start: 2019-07-24 | End: 2019-08-23

## 2019-08-05 DIAGNOSIS — F41.9 ANXIETY: ICD-10-CM

## 2019-08-05 RX ORDER — ALPRAZOLAM 2 MG/1
TABLET ORAL
Qty: 150 TABLET | Refills: 3 | Status: SHIPPED | OUTPATIENT
Start: 2019-08-05 | End: 2019-12-03 | Stop reason: SDUPTHER

## 2019-08-08 ENCOUNTER — OFFICE VISIT (OUTPATIENT)
Dept: FAMILY MEDICINE CLINIC | Facility: CLINIC | Age: 29
End: 2019-08-08
Payer: COMMERCIAL

## 2019-08-08 VITALS
RESPIRATION RATE: 16 BRPM | WEIGHT: 162 LBS | TEMPERATURE: 97.7 F | DIASTOLIC BLOOD PRESSURE: 70 MMHG | BODY MASS INDEX: 21.47 KG/M2 | HEIGHT: 73 IN | OXYGEN SATURATION: 99 % | SYSTOLIC BLOOD PRESSURE: 120 MMHG | HEART RATE: 85 BPM

## 2019-08-08 DIAGNOSIS — G71.11 MYOTONIC DYSTROPHY (HCC): ICD-10-CM

## 2019-08-08 DIAGNOSIS — F41.9 ANXIETY: ICD-10-CM

## 2019-08-08 DIAGNOSIS — R63.4 WEIGHT LOSS: Primary | ICD-10-CM

## 2019-08-08 DIAGNOSIS — G90.521 COMPLEX REGIONAL PAIN SYNDROME TYPE 1 OF RIGHT LOWER EXTREMITY: ICD-10-CM

## 2019-08-08 PROBLEM — M79.671 RIGHT FOOT PAIN: Status: RESOLVED | Noted: 2018-07-03 | Resolved: 2019-08-08

## 2019-08-08 PROCEDURE — 99214 OFFICE O/P EST MOD 30 MIN: CPT | Performed by: FAMILY MEDICINE

## 2019-08-08 NOTE — PROGRESS NOTES
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[address] )    Anxiety        Complex regional pain syndrome type 1 of right lower extremity  Continue spinal cord stimulator  Follow-up with pain management    Anxiety  Continue Xanax        Subjective:      Patient ID: Angie Whitney is a 34 y o  male  Patient comes in for checkup  He now has a spinal cord stimulator which is reducing some of his pain  His mother is with him is concerned regarding the fact he has lost 40 lb in the last year although he has gained a bit of weight since last visit  He saw his neuromuscular disease doctor for his myotonia who did CT scans, blood work and ultrasound of his scrotum to look for reasons why he is losing weight all of this was essentially normal       The following portions of the patient's history were reviewed and updated as appropriate:   He has a past medical history of Abnormal NCS (nerve conduction studies), Anxiety, Chronic pain disorder, and Intervertebral disc disorder with radiculopathy of lumbosacral region (4/24/2019)  ,  does not have any pertinent problems on file  ,   has a past surgical history that includes Arthroscopic repair ACL (Right); Rotator cuff repair (Right); Other surgical history; Knee arthroscopy (Right); and pr percut implnt neuroelect,epidural (Right, 7/12/2019)  ,  family history includes No Known Problems in his father and mother  ,   reports that he has been smoking  He uses smokeless tobacco  He reports that he does not drink alcohol or use drugs  ,  has No Known Allergies     Current Outpatient Medications   Medication Sig Dispense Refill    ALPRAZolam (XANAX) 2 MG tablet 2 bid, 1 qhs 150 tablet 3    cyclobenzaprine (FLEXERIL) 10 mg tablet Take 1 tablet (10 mg total) by mouth 3 (three) times a day as needed for muscle spasms 60 tablet 0    hydrOXYzine pamoate (VISTARIL) 25 mg capsule Take 1 capsule (25 mg total) by mouth every 4 (four) hours as needed for itching or anxiety 100 capsule 5    mexiletine (MEXITIL) 150 mg capsule Take 150 mg by mouth Three times a day       No current facility-administered medications for this visit  Review of Systems   Constitutional: Positive for fatigue  Respiratory: Negative  Cardiovascular: Negative  Gastrointestinal: Negative  Musculoskeletal: Positive for back pain  Neurological: Positive for weakness  Objective:  Vitals:    08/08/19 1107   BP: 120/70   Pulse: 85   Resp: 16   Temp: 97 7 °F (36 5 °C)   SpO2: 99%   Weight: 73 5 kg (162 lb)   Height: 6' 1" (1 854 m)     Body mass index is 21 37 kg/m²  Physical Exam   Constitutional: He is oriented to person, place, and time  He appears well-developed and well-nourished  HENT:   Head: Normocephalic and atraumatic  Right Ear: Tympanic membrane and external ear normal    Left Ear: Tympanic membrane and external ear normal    Eyes: Pupils are equal, round, and reactive to light  EOM are normal    Neck: Neck supple  Cardiovascular: Normal rate, regular rhythm and normal heart sounds  Pulmonary/Chest: Effort normal and breath sounds normal    Abdominal: Soft  Bowel sounds are normal    Musculoskeletal: Normal range of motion  Neurological: He is alert and oriented to person, place, and time  Skin: Skin is warm and dry  Psychiatric: He has a normal mood and affect   Thought content normal

## 2019-08-13 ENCOUNTER — TELEPHONE (OUTPATIENT)
Dept: PAIN MEDICINE | Facility: MEDICAL CENTER | Age: 29
End: 2019-08-13

## 2019-08-13 NOTE — TELEPHONE ENCOUNTER
FYI: I was unable to find a [facility]AC[facility] form on file only a "release of health info form "    I have left vm for [facility] to c/b on both the mobile and home #'s listed on chart  C/B # and [place] provided

## 2019-08-13 NOTE — TELEPHONE ENCOUNTER
Pt mom is asking if Dr Macarena Casey will be placing the order for PT or would it be Dr Clark Harvey who did the surgery for the stimulator to order the PT?     Please advise, she can be reached at [phone]

## 2019-08-13 NOTE — TELEPHONE ENCOUNTER
I s/w pt and asked him who specifically told him that he would need PT sometime post op? Pt said everyone along the way mentioned it  I explained that FQ is out of office until 8/19/19  Pt said he has his 6 wk ov with the sugeon on 8/23/19 but wanted to get PT set up to start the following week after that appt  I advised pt to reach out to the surgeon's office first about the PT script and if there is a problem getting it he could always c/b and we could ask one of the CRNP to order it or we would have FQ address it when he returns on 8/19/19  Pt will call the surgeon's office

## 2019-08-14 ENCOUNTER — TELEPHONE (OUTPATIENT)
Dept: NEUROSURGERY | Facility: CLINIC | Age: 29
End: 2019-08-14

## 2019-08-14 DIAGNOSIS — G90.521 COMPLEX REGIONAL PAIN SYNDROME TYPE 1 OF RIGHT LOWER EXTREMITY: ICD-10-CM

## 2019-08-14 DIAGNOSIS — Z98.890 POST-OPERATIVE STATE: Primary | ICD-10-CM

## 2019-08-14 DIAGNOSIS — M51.17 INTERVERTEBRAL DISC DISORDER WITH RADICULOPATHY OF LUMBOSACRAL REGION: ICD-10-CM

## 2019-08-14 NOTE — TELEPHONE ENCOUNTER
Received a call from Jen Crespo requesting referral for PT  Discussed with THONG GREGORY who authorized placing the order for PT  Notified Jen Crespo of this  She was appreciative

## 2019-08-19 ENCOUNTER — TELEPHONE (OUTPATIENT)
Dept: FAMILY MEDICINE CLINIC | Facility: CLINIC | Age: 29
End: 2019-08-19

## 2019-08-19 DIAGNOSIS — R63.4 WEIGHT LOSS: Primary | ICD-10-CM

## 2019-08-21 ENCOUNTER — TELEPHONE (OUTPATIENT)
Dept: NEUROSURGERY | Facility: CLINIC | Age: 29
End: 2019-08-21

## 2019-08-21 NOTE — TELEPHONE ENCOUNTER
Returned janell to mother questions when patient can start Physical therapy----6 week postoperative visit 8/23 --discuss with DR Oneal Lombard ---  georgina release activity restriction and order start physical therapy     She reports he is doing well , no longer has pain CRPS pain in right leg , continues with coldness, pleased with results of  Surgery

## 2019-08-23 ENCOUNTER — OFFICE VISIT (OUTPATIENT)
Dept: NEUROSURGERY | Facility: CLINIC | Age: 29
End: 2019-08-23

## 2019-08-23 VITALS
BODY MASS INDEX: 21.07 KG/M2 | TEMPERATURE: 98.2 F | SYSTOLIC BLOOD PRESSURE: 110 MMHG | RESPIRATION RATE: 16 BRPM | WEIGHT: 159 LBS | HEIGHT: 73 IN | DIASTOLIC BLOOD PRESSURE: 90 MMHG

## 2019-08-23 DIAGNOSIS — Z48.89 AFTERCARE FOLLOWING SURGERY: Primary | ICD-10-CM

## 2019-08-23 PROCEDURE — 99024 POSTOP FOLLOW-UP VISIT: CPT | Performed by: NEUROLOGICAL SURGERY

## 2019-08-23 NOTE — PROGRESS NOTES
Assessment/Plan:    No problem-specific Assessment & Plan notes found for this encounter  Patient is 6 weeks post operative from a DRG implantation  He has minimal complaints  The patient reported good efficacy from surgery  Does have some coldness and generator discomfort    All the patients restrictions are lifted  All wounds well healed or progressing as normal  I will follow up in 3 months to follow the generator discomfort  Diagnoses and all orders for this visit:    Aftercare following surgery  -     Ambulatory referral to Physical Therapy; Future          Subjective:      Patient ID: Shaunna Aragon is a 34 y o  male  HPI    The following portions of the patient's history were reviewed and updated as appropriate: allergies, current medications, past family history, past medical history, past social history, past surgical history and problem list     Review of Systems   Constitutional: Positive for fatigue  Feeling poorly    HENT: Negative  Eyes: Negative  Respiratory: Negative  Cardiovascular: Negative  Gastrointestinal: Negative for nausea (due to increase of pain )  Endocrine: Negative  Genitourinary: Negative  Musculoskeletal: Positive for back pain (SCS helping with CRPS in foot  )  Right foot CRPS  With discoloration  SCS is helping   Skin: Positive for color change (right foot)  Allergic/Immunologic: Negative  Neurological: Positive for weakness (right leg )  Negative for dizziness, seizures, syncope, numbness and headaches  Hematological: Bruises/bleeds easily  Psychiatric/Behavioral: Positive for sleep disturbance (due to pain )  Negative for confusion  Objective:      /90 (BP Location: Left arm)   Temp 98 2 °F (36 8 °C) (Tympanic)   Resp 16   Ht 6' 1" (1 854 m)   Wt 72 1 kg (159 lb)   BMI 20 98 kg/m²          Physical Exam   Constitutional: He is oriented to person, place, and time  He appears well-developed     HENT:   Head: Normocephalic  Eyes: Pupils are equal, round, and reactive to light  Pulmonary/Chest: Effort normal    Neurological: He is alert and oriented to person, place, and time  incisions well healed

## 2019-10-31 ENCOUNTER — DOCUMENTATION (OUTPATIENT)
Dept: OTHER | Facility: HOSPITAL | Age: 29
End: 2019-10-31

## 2019-10-31 NOTE — PROGRESS NOTES
Patient arrived on 10/30/19 for his three-month [facility] [facility] follow-up appointment  Patient completed all questionnaires associated with the study  Patient stated his activity level has increased, he is attending PT and exercising on other days and that his overall quality of life has greatly improved  Data was collected from his DRG  Programming not done at this visit  Pepe Alcocer has an appointment with Dr Corey Wong on 11/22/2019, surgical follow-up

## 2019-11-22 ENCOUNTER — OFFICE VISIT (OUTPATIENT)
Dept: NEUROSURGERY | Facility: CLINIC | Age: 29
End: 2019-11-22

## 2019-11-22 VITALS
DIASTOLIC BLOOD PRESSURE: 82 MMHG | TEMPERATURE: 98.1 F | HEART RATE: 76 BPM | HEIGHT: 73 IN | WEIGHT: 168 LBS | RESPIRATION RATE: 16 BRPM | SYSTOLIC BLOOD PRESSURE: 124 MMHG | BODY MASS INDEX: 22.26 KG/M2

## 2019-11-22 DIAGNOSIS — Z48.89 AFTERCARE FOLLOWING SURGERY: Primary | ICD-10-CM

## 2019-11-22 PROCEDURE — 99024 POSTOP FOLLOW-UP VISIT: CPT | Performed by: NEUROLOGICAL SURGERY

## 2019-11-22 NOTE — PROGRESS NOTES
Assessment/Plan:    No problem-specific Assessment & Plan notes found for this encounter  Patient is 4 months post op from a dorsal root ganglion stimulator placement  He reports that he is happy with his results no significant stimulator issues  We can follow-up PRN at this point     Diagnoses and all orders for this visit:    Aftercare following surgery          Subjective:      Patient ID: Mariah Diane is a 34 y o  male  Patient is 4 months post op from a dorsal root ganglion stimulator placement no issues reported  Getting good efficacy and pain relief from the stimulator  The following portions of the patient's history were reviewed and updated as appropriate: allergies, current medications, past family history, past medical history, past social history, past surgical history and problem list     Review of Systems   Constitutional: Positive for fatigue  Feeling poorly    HENT: Negative  Eyes: Negative  Respiratory: Negative  Cardiovascular: Negative  Gastrointestinal: Negative for nausea (due to increase of pain )  Endocrine: Negative  Genitourinary: Negative  Musculoskeletal: Positive for back pain (SCS helping with CRPS in foot  )  Right foot CRPS  With discoloration  SCS is helping   Skin: Positive for color change (right foot)  Allergic/Immunologic: Negative  Neurological: Positive for weakness (right leg )  Negative for dizziness, seizures, syncope, numbness and headaches  Hematological: Bruises/bleeds easily  Psychiatric/Behavioral: Positive for sleep disturbance (due to pain )  Negative for confusion  All other systems reviewed and are negative        I have personally reviewed all aspects of the review of systems as documented    Objective:      /82   Pulse 76   Temp 98 1 °F (36 7 °C) (Tympanic)   Resp 16   Ht 6' 1" (1 854 m)   Wt 76 2 kg (168 lb)   BMI 22 16 kg/m²          Physical Exam   Constitutional: He is oriented to person, place, and time  He appears well-developed  HENT:   Head: Normocephalic and atraumatic  Pulmonary/Chest: Effort normal    Neurological: He is alert and oriented to person, place, and time       incisions well healed

## 2019-12-02 ENCOUNTER — TELEPHONE (OUTPATIENT)
Dept: NEUROSURGERY | Facility: CLINIC | Age: 29
End: 2019-12-02

## 2019-12-02 DIAGNOSIS — G90.521 COMPLEX REGIONAL PAIN SYNDROME TYPE 1 OF RIGHT LOWER EXTREMITY: ICD-10-CM

## 2019-12-02 DIAGNOSIS — Z48.89 AFTERCARE FOLLOWING SURGERY: Primary | ICD-10-CM

## 2019-12-02 DIAGNOSIS — M51.17 INTERVERTEBRAL DISC DISORDER WITH RADICULOPATHY OF LUMBOSACRAL REGION: ICD-10-CM

## 2019-12-02 NOTE — TELEPHONE ENCOUNTER
Received a call from both [place] of  PT and the patients mother Archana requesting Rx for PT be placed  She states that Dr Johann Belcher mentioned PT during recent OV 11/22/2019  Placed order with approval from Dr Johann Belcher  Faxed to provided fax number

## 2019-12-03 DIAGNOSIS — F41.9 ANXIETY: ICD-10-CM

## 2019-12-03 RX ORDER — HYDROXYZINE PAMOATE 25 MG/1
25 CAPSULE ORAL EVERY 4 HOURS PRN
Qty: 100 CAPSULE | Refills: 5 | Status: SHIPPED | OUTPATIENT
Start: 2019-12-03 | End: 2020-01-31 | Stop reason: SDUPTHER

## 2019-12-03 RX ORDER — ALPRAZOLAM 2 MG/1
TABLET ORAL
Qty: 150 TABLET | Refills: 3 | Status: SHIPPED | OUTPATIENT
Start: 2019-12-03 | End: 2020-03-31 | Stop reason: SDUPTHER

## 2020-01-02 ENCOUNTER — TELEPHONE (OUTPATIENT)
Dept: PAIN MEDICINE | Facility: CLINIC | Age: 30
End: 2020-01-02
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
## 2020-01-02 NOTE — TELEPHONE ENCOUNTER
Patient's Mother Hammad Tolentino would like  a copy of the patients formal diagnosis for CRPS to be mailed to the address on file    Please advise, thx  Please contact the patients Gricel Alamo phone #[phone]

## 2020-01-03 NOTE — TELEPHONE ENCOUNTER
I faxed the orig consult from 3/4/19 and the H&P from the [facility] Trial from 6/6/19 to fax # provided  I called and s/w Marty Cordoba and made her fax sent

## 2020-01-06 ENCOUNTER — TELEPHONE (OUTPATIENT)
Dept: OTHER | Facility: HOSPITAL | Age: 30
End: 2020-01-06

## 2020-01-06 ENCOUNTER — OFFICE VISIT (OUTPATIENT)
Dept: FAMILY MEDICINE CLINIC | Facility: CLINIC | Age: 30
End: 2020-01-06
Payer: COMMERCIAL

## 2020-01-06 VITALS
HEIGHT: 73 IN | OXYGEN SATURATION: 99 % | BODY MASS INDEX: 21.74 KG/M2 | DIASTOLIC BLOOD PRESSURE: 72 MMHG | SYSTOLIC BLOOD PRESSURE: 106 MMHG | WEIGHT: 164 LBS | HEART RATE: 80 BPM | TEMPERATURE: 99.4 F

## 2020-01-06 DIAGNOSIS — J02.9 SORE THROAT: ICD-10-CM

## 2020-01-06 DIAGNOSIS — J06.9 VIRAL UPPER RESPIRATORY TRACT INFECTION: ICD-10-CM

## 2020-01-06 DIAGNOSIS — J06.9 ACUTE UPPER RESPIRATORY INFECTION: Primary | ICD-10-CM

## 2020-01-06 LAB — S PYO AG THROAT QL: NEGATIVE

## 2020-01-06 PROCEDURE — 87880 STREP A ASSAY W/OPTIC: CPT | Performed by: NURSE PRACTITIONER

## 2020-01-06 PROCEDURE — 87147 CULTURE TYPE IMMUNOLOGIC: CPT | Performed by: NURSE PRACTITIONER

## 2020-01-06 PROCEDURE — 87631 RESP VIRUS 3-5 TARGETS: CPT | Performed by: NURSE PRACTITIONER

## 2020-01-06 PROCEDURE — 3008F BODY MASS INDEX DOCD: CPT | Performed by: NURSE PRACTITIONER

## 2020-01-06 PROCEDURE — 99213 OFFICE O/P EST LOW 20 MIN: CPT | Performed by: NURSE PRACTITIONER

## 2020-01-06 PROCEDURE — 87070 CULTURE OTHR SPECIMN AEROBIC: CPT | Performed by: NURSE PRACTITIONER

## 2020-01-06 RX ORDER — BROMPHENIRAMINE MALEATE, PSEUDOEPHEDRINE HYDROCHLORIDE, AND DEXTROMETHORPHAN HYDROBROMIDE 2; 30; 10 MG/5ML; MG/5ML; MG/5ML
5 SYRUP ORAL 4 TIMES DAILY PRN
Qty: 118 ML | Refills: 0 | Status: SHIPPED | OUTPATIENT
Start: 2020-01-06 | End: 2020-07-29

## 2020-01-06 RX ORDER — LIDOCAINE HYDROCHLORIDE 20 MG/ML
15 SOLUTION OROPHARYNGEAL 4 TIMES DAILY PRN
Qty: 100 ML | Refills: 0 | Status: SHIPPED | OUTPATIENT
Start: 2020-01-06 | End: 2020-07-29

## 2020-01-06 RX ORDER — ACETAZOLAMIDE 250 MG/1
125 TABLET ORAL 2 TIMES DAILY
COMMUNITY
Start: 2019-12-24 | End: 2021-05-10

## 2020-01-06 NOTE — TELEPHONE ENCOUNTER
Frakn Gray agreed to be seen 1/15/2020 for his 6 month f/u visit for the [employer] [facility] study at [place] @ 1:15 p m  He'll meet with the  and the [employer] Rep only

## 2020-01-06 NOTE — PROGRESS NOTES
Assessment/Plan:    Upper respiratory infection  Rapid strep negative, will obtain throat culture and follow  To begin Bromfed DM as needed for cough and viscous lidocaine  Counseled on staying well hydrated, Tylneol/Motrin for fever, and salt water gargles  Follow up if symptoms have not improved in 1 week or sooner if symptoms worsened  Diagnoses and all orders for this visit:    Acute upper respiratory infection  -     Influenza A/B and RSV PCR; Future  -     Influenza A/B and RSV PCR  -     brompheniramine-pseudoephedrine-DM 30-2-10 MG/5ML syrup; Take 5 mL by mouth 4 (four) times a day as needed for congestion  -     Lidocaine Viscous HCl (XYLOCAINE) 2 % mucosal solution; Swish and spit 15 mL 4 (four) times a day as needed (sore throat)    Sore throat  -     POCT rapid strepA  -     Throat culture; Future    Viral upper respiratory tract infection    Other orders  -     acetaZOLAMIDE (DIAMOX) 250 mg tablet; Take 250 mg by mouth 2 (two) times a day          Subjective:      Patient ID: Brandon Novak is a 34 y o  male  Rana Slight presents reporting a sore throat x 3 days  He is also having post nasal drip, cough, and rhinorrhea  He treated his symtpoms with OTC [place] which provided some relief  Denies fever, SOB, wheezing or sick contacts         The following portions of the patient's history were reviewed and updated as appropriate:   He   Patient Active Problem List    Diagnosis Date Noted    Upper respiratory infection 01/06/2020    Radiculopathy, lumbar region     Intervertebral disc disorder with radiculopathy of lumbosacral region 04/24/2019    Chronic pain syndrome 03/15/2019    Complex regional pain syndrome type 1 of right lower extremity 09/04/2018    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[facility] [address] ) 07/03/2018    Edema 05/03/2018    Arthralgia 05/03/2018    Weight loss 05/03/2018    Insomnia 03/05/2018    Anxiety 02/02/2018    Raynaud phenomenon 11/30/2017    Prolonged QT interval 02/02/2017    Constipation 01/04/2016    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 12/23/2015     Current Outpatient Medications   Medication Sig Dispense Refill    acetaZOLAMIDE (DIAMOX) 250 mg tablet Take 250 mg by mouth 2 (two) times a day      ALPRAZolam (XANAX) 2 MG tablet 2 bid, 1 qhs 150 tablet 3    hydrOXYzine pamoate (VISTARIL) 25 mg capsule Take 1 capsule (25 mg total) by mouth every 4 (four) hours as needed for itching or anxiety 100 capsule 5    brompheniramine-pseudoephedrine-DM 30-2-10 MG/5ML syrup Take 5 mL by mouth 4 (four) times a day as needed for congestion 118 mL 0    Lidocaine Viscous HCl (XYLOCAINE) 2 % mucosal solution Swish and spit 15 mL 4 (four) times a day as needed (sore throat) 100 mL 0     No current facility-administered medications for this visit  He has No Known Allergies       Review of Systems   Constitutional: Positive for chills and fatigue  HENT: Positive for congestion, postnasal drip, rhinorrhea, sore throat and voice change  Respiratory: Positive for cough  Cardiovascular: Negative  Neurological: Negative  Psychiatric/Behavioral: Negative  /72   Pulse 80   Temp 99 4 °F (37 4 °C) (Tympanic)   Ht 6' 1" (1 854 m)   Wt 74 4 kg (164 lb)   SpO2 99%   BMI 21 64 kg/m²     Objective:     Physical Exam   Constitutional: He is oriented to person, place, and time  He appears well-developed and well-nourished  Non-toxic appearance  HENT:   Head: Normocephalic and atraumatic  Right Ear: Tympanic membrane normal    Left Ear: Tympanic membrane normal    Nose: Rhinorrhea present  Mouth/Throat: Posterior oropharyngeal erythema present  No oropharyngeal exudate  Tonsils absent    Neck: Neck supple  Cardiovascular: Normal rate and normal heart sounds  No murmur heard  Pulmonary/Chest: Effort normal and breath sounds normal    Lymphadenopathy:     He has no cervical adenopathy     Neurological: He is alert and oriented to person, place, and time  Skin: Skin is warm and dry  Capillary refill takes less than 2 seconds  Psychiatric: He has a normal mood and affect  His behavior is normal    Nursing note and vitals reviewed        Results for orders placed or performed in visit on 01/06/20   POCT rapid strepA   Result Value Ref Range     RAPID STREP A Negative Negative

## 2020-01-06 NOTE — ASSESSMENT & PLAN NOTE
Rapid strep negative, will obtain throat culture and follow  To begin Bromfed DM as needed for cough and viscous lidocaine  Counseled on staying well hydrated, Tylneol/Motrin for fever, and salt water gargles  Follow up if symptoms have not improved in 1 week or sooner if symptoms worsened

## 2020-01-07 LAB
FLUAV RNA NPH QL NAA+PROBE: NORMAL
FLUBV RNA NPH QL NAA+PROBE: NORMAL
RSV RNA NPH QL NAA+PROBE: NORMAL

## 2020-01-08 LAB — BACTERIA THROAT CULT: ABNORMAL

## 2020-01-09 ENCOUNTER — TELEPHONE (OUTPATIENT)
Dept: FAMILY MEDICINE CLINIC | Facility: CLINIC | Age: 30
End: 2020-01-09

## 2020-01-09 DIAGNOSIS — J02.9 SORE THROAT: Primary | ICD-10-CM

## 2020-01-09 RX ORDER — AMOXICILLIN 500 MG/1
500 CAPSULE ORAL EVERY 12 HOURS SCHEDULED
Qty: 20 CAPSULE | Refills: 0 | Status: SHIPPED | OUTPATIENT
Start: 2020-01-09 | End: 2020-01-19

## 2020-01-09 NOTE — TELEPHONE ENCOUNTER
----- Message from Elias Bobby, [address] sent at 1/9/2020  7:29 AM EST -----  Please call patient to make aware that his throat culture was positive for a bacterial infection  If his throat is still sore, I will send an an antibiotic  Please let me know either way

## 2020-01-09 NOTE — TELEPHONE ENCOUNTER
----- Message from Ben Perez, [address] sent at 1/7/2020  3:34 PM EST -----  Please call patient to make aware that viral culture obtained for influenza was negative, throat culture still pending

## 2020-01-09 NOTE — TELEPHONE ENCOUNTER
Please call patient to make aware that I sent in amoxicillin  He is to let us know if his symptoms have not started to improve by Monday  Please reiterated the importance of gargling with salt water, staying well-hydrated, resting, in Tylenol/Motrin for fever    Thank you

## 2020-01-10 ENCOUNTER — TELEPHONE (OUTPATIENT)
Dept: FAMILY MEDICINE CLINIC | Facility: CLINIC | Age: 30
End: 2020-01-10

## 2020-01-10 NOTE — TELEPHONE ENCOUNTER
----- Message from Sheridan Goff, [address] sent at 1/7/2020  3:34 PM EST -----  Please call patient to make aware that viral culture obtained for influenza was negative, throat culture still pending

## 2020-01-14 ENCOUNTER — TELEPHONE (OUTPATIENT)
Dept: PAIN MEDICINE | Facility: CLINIC | Age: 30
End: 2020-01-14

## 2020-01-14 DIAGNOSIS — G90.521 COMPLEX REGIONAL PAIN SYNDROME TYPE 1 OF RIGHT LOWER EXTREMITY: Primary | ICD-10-CM

## 2020-01-14 NOTE — TELEPHONE ENCOUNTER
S/w pt and his mother Janis Barron  They had several questions about the patient's plan of care moving forward  Pt has been participating in physical therapy, originially prescribed by Dr Zully Perera  PT script expires this coming Thursday  Pt and mother state that the physical therapy script was only written by Dr Zully Perera because they were not able to receive one from [facility] office fast enough  They would like to know, from [facility], if pt should continue physical therapy or if he has completed as much treatment to be beneficial  Advised that because Dr Zully Perera was the original prescriber, that they should reach out to that office for clarification of how long pt needs to participate in physical therapy  Pt and his mother countered, by saying that  should be the doctor to make the decision because he is responsible for managing pt's pain moving forward  Pt has not been seen in the office recently, and does not have any upcoming appts  Pt is willing to come in for office visit if necessary  Please advise, thank you

## 2020-01-14 NOTE — TELEPHONE ENCOUNTER
S/w pt and his mother, states he feels he is still making progress in therapy and he would like to continue with PT appointments  States his PT script is valid through the rest of this week, after that he will need a new prescription  Please advise, thank you

## 2020-01-14 NOTE — TELEPHONE ENCOUNTER
He's done extensive PT  Has he plateaued or is he still making progress    If he's plateaued, he may d/c and do exercises on his own

## 2020-01-14 NOTE — TELEPHONE ENCOUNTER
Pts mother Nikunj Zuniga called  She said she rec'd a call from dr cunningham's office months ago about setting up PT for the patients CR[facility]  Caller needs script faxed to her at fax# [phone], also needs to find out where she needs to go for this therapy so we can "all be on the same page"  Please advise   Ph# [phone]

## 2020-01-15 ENCOUNTER — DOCUMENTATION (OUTPATIENT)
Dept: OTHER | Facility: HOSPITAL | Age: 30
End: 2020-01-15

## 2020-01-15 NOTE — PROGRESS NOTES
Clinical Research Note    Kimberly Singleton arrived in Dr Fabian Franz  01/15/20 for:  6 month St  [facility] TARGET Study       Questionnaires were completed   Adverse events were reviewed  NONE   Device interrogation collected by [employer]  Programming NOT DONE at this visit  Next scheduled will be the 12 month follow-up appointment   Advised Nanci to reach out to clinical trials or to [address] [place] [address] with any additional questions or concerns

## 2020-01-15 NOTE — TELEPHONE ENCOUNTER
I s/w pt and he would like the PT script and a list of [facility] PT locations faxed to him at Fax # [phone]  Faxes sent

## 2020-01-31 DIAGNOSIS — F41.9 ANXIETY: ICD-10-CM

## 2020-01-31 RX ORDER — HYDROXYZINE PAMOATE 25 MG/1
25 CAPSULE ORAL EVERY 4 HOURS PRN
Qty: 100 CAPSULE | Refills: 5 | Status: SHIPPED | OUTPATIENT
Start: 2020-01-31 | End: 2020-07-28 | Stop reason: SDUPTHER

## 2020-03-31 DIAGNOSIS — F41.9 ANXIETY: ICD-10-CM

## 2020-03-31 RX ORDER — ALPRAZOLAM 2 MG/1
TABLET ORAL
Qty: 150 TABLET | Refills: 3 | Status: SHIPPED | OUTPATIENT
Start: 2020-03-31 | End: 2020-07-29 | Stop reason: SDUPTHER

## 2020-03-31 NOTE — TELEPHONE ENCOUNTER
rf Alprazolam   PDMP  03/02/2020  1  12/03/2019  ALPRAZOLAM 2 MG TABLET  150 0  30      [facility]

## 2020-04-21 ENCOUNTER — TELEPHONE (OUTPATIENT)
Dept: FAMILY MEDICINE CLINIC | Facility: CLINIC | Age: 30
End: 2020-04-21

## 2020-06-19 ENCOUNTER — DOCUMENTATION (OUTPATIENT)
Dept: NEUROSURGERY | Facility: CLINIC | Age: 30
End: 2020-06-19

## 2020-07-28 ENCOUNTER — TELEPHONE (OUTPATIENT)
Dept: FAMILY MEDICINE CLINIC | Facility: CLINIC | Age: 30
End: 2020-07-28

## 2020-07-28 DIAGNOSIS — F41.9 ANXIETY: ICD-10-CM

## 2020-07-28 RX ORDER — HYDROXYZINE PAMOATE 25 MG/1
25 CAPSULE ORAL EVERY 4 HOURS PRN
Qty: 100 CAPSULE | Refills: 5 | Status: SHIPPED | OUTPATIENT
Start: 2020-07-28 | End: 2020-11-23 | Stop reason: SDUPTHER

## 2020-07-28 RX ORDER — ALPRAZOLAM 2 MG/1
TABLET ORAL
Qty: 150 TABLET | Refills: 3 | OUTPATIENT
Start: 2020-07-28

## 2020-07-28 NOTE — TELEPHONE ENCOUNTER
06/28/2020  1  03/31/2020  ALPRAZOLAM 2 MG TABLET  150 0  30  BR G[facility]  [id number]  PENNS ([id number])  3   Private Pay  [place]    05/31/2020  1  03/31/2020  ALPRAZOLAM 2 MG TABLET  150 0  30  BR GREG[facility]IO  [id number]  PENNS ([id number])  2   Private Pay  [place]    05/01/2020  1  03/31/2020  ALPRAZOLAM 2 MG TABLET  150 0  30  BR [facility]  [id number]  PENNS ([id number])  1   Private Pay  [place]    03/31/2020  1  03/31/2020  ALPRAZOLAM 2 MG TABLET  150 0  50  BR G[facility]  [id number]  PENNS ([id number])  0   Private Pay  [place]    03/02/2020  1  12/03/2019  ALPRAZOLAM 2 MG TABLET  150 0  30  BR GREG[facility]  [id number]  PENNS ([id number])  3   Private Pay  [place]    02/01/2020  1  12/03/2019  ALPRAZOLAM 2 MG TABLET  150 0  30  BR [facility]  [id number]  PENNS ([id number])  2   Private Pay  [place]    01/02/2020  1  12/03/2019  ALPRAZOLAM 2 MG TABLET  150 0  30  BR GREG[facility]IO  [id number]  PENNS ([id number])  1   Private Pay  [place]

## 2020-07-28 NOTE — TELEPHONE ENCOUNTER
Medication:  PDMP  06/28/2020  1  03/31/2020  ALPRAZOLAM 2 MG TABLET  150 0  30  BR [facility]REG[facility]  [id number]  PENNS ([id number])  3   Private Pay  [place]    05/31/2020  1  03/31/2020  ALPRAZOLAM 2 MG TABLET  150 0  30  BR GREG[facility]IO  [id number]  PENNS ([id number])  2   Private Pay  [place]    05/01/2020  1  03/31/2020  ALPRAZOLAM 2 MG TABLET  150 0  30  BR GREG[facility]IO  [id number]  PENNS ([id number])  1   Private Pay  [place]       Active agreement on file -No

## 2020-07-29 ENCOUNTER — OFFICE VISIT (OUTPATIENT)
Dept: FAMILY MEDICINE CLINIC | Facility: CLINIC | Age: 30
End: 2020-07-29
Payer: COMMERCIAL

## 2020-07-29 ENCOUNTER — APPOINTMENT (OUTPATIENT)
Dept: LAB | Facility: HOSPITAL | Age: 30
End: 2020-07-29
Payer: COMMERCIAL

## 2020-07-29 VITALS
WEIGHT: 156 LBS | BODY MASS INDEX: 20.67 KG/M2 | HEART RATE: 76 BPM | HEIGHT: 73 IN | DIASTOLIC BLOOD PRESSURE: 70 MMHG | SYSTOLIC BLOOD PRESSURE: 112 MMHG | OXYGEN SATURATION: 98 % | TEMPERATURE: 97.8 F

## 2020-07-29 DIAGNOSIS — F41.9 ANXIETY: ICD-10-CM

## 2020-07-29 DIAGNOSIS — R63.4 WEIGHT LOSS: ICD-10-CM

## 2020-07-29 DIAGNOSIS — Z00.00 HEALTH MAINTENANCE EXAMINATION: Primary | ICD-10-CM

## 2020-07-29 DIAGNOSIS — G71.19 PARAMYOTONIA CONGENITA: ICD-10-CM

## 2020-07-29 DIAGNOSIS — G90.521 COMPLEX REGIONAL PAIN SYNDROME TYPE 1 OF RIGHT LOWER EXTREMITY: ICD-10-CM

## 2020-07-29 PROBLEM — J06.9 UPPER RESPIRATORY INFECTION: Status: RESOLVED | Noted: 2020-01-06 | Resolved: 2020-07-29

## 2020-07-29 PROBLEM — R60.9 EDEMA: Status: RESOLVED | Noted: 2018-05-03 | Resolved: 2020-07-29

## 2020-07-29 PROBLEM — G71.11 MYOTONIC DYSTROPHY (HCC): Status: RESOLVED | Noted: 2018-07-03 | Resolved: 2020-07-29

## 2020-07-29 PROBLEM — M25.50 ARTHRALGIA: Status: RESOLVED | Noted: 2018-05-03 | Resolved: 2020-07-29

## 2020-07-29 LAB
ALBUMIN SERPL BCP-MCNC: 5.1 G/DL (ref 3.4–4.8)
ALP SERPL-CCNC: 28.2 U/L (ref 10–129)
ALT SERPL W P-5'-P-CCNC: 14 U/L (ref 5–63)
ANION GAP SERPL CALCULATED.3IONS-SCNC: 9 MMOL/L (ref 4–13)
AST SERPL W P-5'-P-CCNC: 16 U/L (ref 15–41)
BASOPHILS # BLD AUTO: 0.02 THOUSANDS/ΜL (ref 0–0.1)
BASOPHILS NFR BLD AUTO: 0 % (ref 0–1)
BILIRUB SERPL-MCNC: 1.81 MG/DL (ref 0.3–1.2)
BUN SERPL-MCNC: 17 MG/DL (ref 6–20)
CALCIUM SERPL-MCNC: 9.5 MG/DL (ref 8.4–10.2)
CHLORIDE SERPL-SCNC: 108 MMOL/L (ref 96–108)
CO2 SERPL-SCNC: 25 MMOL/L (ref 22–33)
CREAT SERPL-MCNC: 0.94 MG/DL (ref 0.5–1.2)
EOSINOPHIL # BLD AUTO: 0.01 THOUSAND/ΜL (ref 0–0.61)
EOSINOPHIL NFR BLD AUTO: 0 % (ref 0–6)
ERYTHROCYTE [DISTWIDTH] IN BLOOD BY AUTOMATED COUNT: 12.6 % (ref 11.6–15.1)
GFR SERPL CREATININE-BSD FRML MDRD: 110 ML/MIN/1.73SQ M
GLUCOSE P FAST SERPL-MCNC: 89 MG/DL (ref 65–99)
HCT VFR BLD AUTO: 44.2 % (ref 36.5–49.3)
HGB BLD-MCNC: 16 G/DL (ref 12–17)
IMM GRANULOCYTES # BLD AUTO: 0.01 THOUSAND/UL (ref 0–0.2)
IMM GRANULOCYTES NFR BLD AUTO: 0 % (ref 0–2)
LYMPHOCYTES # BLD AUTO: 0.71 THOUSANDS/ΜL (ref 0.6–4.47)
LYMPHOCYTES NFR BLD AUTO: 14 % (ref 14–44)
MCH RBC QN AUTO: 30.8 PG (ref 26.8–34.3)
MCHC RBC AUTO-ENTMCNC: 36.2 G/DL (ref 31.4–37.4)
MCV RBC AUTO: 85 FL (ref 82–98)
MONOCYTES # BLD AUTO: 0.31 THOUSAND/ΜL (ref 0.17–1.22)
MONOCYTES NFR BLD AUTO: 6 % (ref 4–12)
NEUTROPHILS # BLD AUTO: 4.01 THOUSANDS/ΜL (ref 1.85–7.62)
NEUTS SEG NFR BLD AUTO: 80 % (ref 43–75)
PLATELET # BLD AUTO: 166 THOUSANDS/UL (ref 149–390)
PMV BLD AUTO: 9.3 FL (ref 8.9–12.7)
POTASSIUM SERPL-SCNC: 3.6 MMOL/L (ref 3.5–5)
PROT SERPL-MCNC: 6.9 G/DL (ref 6.4–8.3)
RBC # BLD AUTO: 5.2 MILLION/UL (ref 3.88–5.62)
SODIUM SERPL-SCNC: 142 MMOL/L (ref 133–145)
TSH SERPL DL<=0.05 MIU/L-ACNC: 1.03 UIU/ML (ref 0.34–5.6)
WBC # BLD AUTO: 5.07 THOUSAND/UL (ref 4.31–10.16)

## 2020-07-29 PROCEDURE — 3008F BODY MASS INDEX DOCD: CPT | Performed by: FAMILY MEDICINE

## 2020-07-29 PROCEDURE — 99395 PREV VISIT EST AGE 18-39: CPT | Performed by: FAMILY MEDICINE

## 2020-07-29 PROCEDURE — 80053 COMPREHEN METABOLIC PANEL: CPT

## 2020-07-29 PROCEDURE — 85025 COMPLETE CBC W/AUTO DIFF WBC: CPT

## 2020-07-29 PROCEDURE — 36415 COLL VENOUS BLD VENIPUNCTURE: CPT

## 2020-07-29 PROCEDURE — 84443 ASSAY THYROID STIM HORMONE: CPT

## 2020-07-29 RX ORDER — ALPRAZOLAM 2 MG/1
TABLET ORAL
Qty: 150 TABLET | Refills: 3 | Status: SHIPPED | OUTPATIENT
Start: 2020-07-29 | End: 2020-11-23 | Stop reason: SDUPTHER

## 2020-07-29 RX ORDER — ALPRAZOLAM 2 MG/1
TABLET ORAL
Qty: 150 TABLET | Refills: 3 | Status: CANCELLED | OUTPATIENT
Start: 2020-07-29

## 2020-07-29 NOTE — PROGRESS NOTES
Tobacco Cessation Counseling: Tobacco cessation counseling was provided  The patient is sincerely urged to quit consumption of tobacco  He is not ready to quit tobacco    Assessment/Plan:    Return visit in 6 months  We will call regarding results of his blood tests     Problem List Items Addressed This Visit        Nervous and Auditory    Complex regional pain syndrome type 1 of right lower extremity       Musculoskeletal and Integument    Paramyotonia congenita     Continue Diamox 250 mg b i d  Other    Anxiety    Relevant Medications    ALPRAZolam (XANAX) 2 MG tablet    Weight loss    Relevant Orders    CBC and differential    Comprehensive metabolic panel    TSH, 3rd generation with Free T4 reflex    Health maintenance examination - Primary            Subjective:      Patient ID: Fletcher Ackerman is a 31 y o  male  Patient comes in for a checkup  Since he was here last he was diagnosed with paramyotonia congenita  He has weakness and diffuse muscle spasms  He has been given Diamox to help with his muscle spasms which has helped to some degree  Nerve stimulator is working well for his complex regional pain syndrome of his right lower extremity  He continues to have problems with anxiety and insomnia and is using Xanax for this  The following portions of the patient's history were reviewed and updated as appropriate:   He has a past medical history of Abnormal NCS (nerve conduction studies), Anxiety, Chronic pain disorder, and Intervertebral disc disorder with radiculopathy of lumbosacral region (4/24/2019)  ,  does not have any pertinent problems on file  ,   has a past surgical history that includes Arthroscopic repair ACL (Right); Rotator cuff repair (Right); Other surgical history; Knee arthroscopy (Right); and pr percut implnt neuroelect,epidural (Right, 7/12/2019)  ,  family history includes No Known Problems in his father and mother  ,   reports that he has been smoking   He uses smokeless tobacco  He reports that he does not drink alcohol or use drugs  ,  has No Known Allergies     Current Outpatient Medications   Medication Sig Dispense Refill    acetaZOLAMIDE (DIAMOX) 250 mg tablet Take 250 mg by mouth 2 (two) times a day      ALPRAZolam (XANAX) 2 MG tablet 2 bid, 1 qhs 150 tablet 3    hydrOXYzine pamoate (VISTARIL) 25 mg capsule Take 1 capsule (25 mg total) by mouth every 4 (four) hours as needed for itching or anxiety 100 capsule 5     No current facility-administered medications for this visit  Review of Systems   Constitutional: Negative  Respiratory: Negative  Cardiovascular: Negative  Gastrointestinal: Negative  Musculoskeletal: Positive for myalgias  Neurological: Positive for weakness  Psychiatric/Behavioral: Positive for sleep disturbance  Negative for dysphoric mood  The patient is nervous/anxious  Objective:  Vitals:    07/29/20 0930   BP: 112/70   Pulse: 76   Temp: 97 8 °F (36 6 °C)   SpO2: 98%   Weight: 70 8 kg (156 lb)   Height: 6' 1" (1 854 m)     Body mass index is 20 58 kg/m²  Physical Exam   Constitutional: He is oriented to person, place, and time  He appears well-developed and well-nourished  HENT:   Head: Normocephalic and atraumatic  Right Ear: External ear normal    Left Ear: External ear normal    Eyes: Pupils are equal, round, and reactive to light  EOM are normal    Neck: Neck supple  Cardiovascular: Normal rate, regular rhythm and normal heart sounds  Pulmonary/Chest: Effort normal and breath sounds normal    Abdominal: Soft  Bowel sounds are normal    Musculoskeletal: Normal range of motion  Neurological: He is alert and oriented to person, place, and time  Skin: Skin is warm and dry  Psychiatric: He has a normal mood and affect   Thought content normal

## 2020-07-30 ENCOUNTER — TELEPHONE (OUTPATIENT)
Dept: OTHER | Facility: HOSPITAL | Age: 30
End: 2020-07-30

## 2020-07-30 NOTE — TELEPHONE ENCOUNTER
spoke with Deepak Anthony on [id number] to discuss the TARGET study with him  I will mail the study-related questionnaires to the patient since he does not have an appointment scheduled with pain management  At this time, he does not need an adjustment with his device as it is working well  No device-related concerns to report or other areas of concern  Deepak Anthony will complete the questionnaires and return to clinical trials as soon as possible

## 2020-08-21 ENCOUNTER — DOCUMENTATION (OUTPATIENT)
Dept: OTHER | Facility: HOSPITAL | Age: 30
End: 2020-08-21

## 2020-08-21 NOTE — PROGRESS NOTES
Luiza Cueto completed all questionnaires associated with the 12-Month St  [facility] TARGET study  He had requested site mail questionnaires as he did not have a follow-up appointment scheduled with Dr Veliz Parent  See source document for details  Next study-related appointment will be the 18-month follow up visit

## 2020-11-03 ENCOUNTER — TELEMEDICINE (OUTPATIENT)
Dept: FAMILY MEDICINE CLINIC | Facility: CLINIC | Age: 30
End: 2020-11-03
Payer: MEDICARE

## 2020-11-03 VITALS — WEIGHT: 156 LBS | HEIGHT: 73 IN | BODY MASS INDEX: 20.67 KG/M2

## 2020-11-03 DIAGNOSIS — B34.9 VIRAL SYNDROME: Primary | ICD-10-CM

## 2020-11-03 PROCEDURE — 99213 OFFICE O/P EST LOW 20 MIN: CPT | Performed by: STUDENT IN AN ORGANIZED HEALTH CARE EDUCATION/TRAINING PROGRAM

## 2020-11-23 ENCOUNTER — OFFICE VISIT (OUTPATIENT)
Dept: FAMILY MEDICINE CLINIC | Facility: CLINIC | Age: 30
End: 2020-11-23
Payer: MEDICARE

## 2020-11-23 VITALS
BODY MASS INDEX: 21.6 KG/M2 | WEIGHT: 163 LBS | DIASTOLIC BLOOD PRESSURE: 70 MMHG | OXYGEN SATURATION: 100 % | HEIGHT: 73 IN | SYSTOLIC BLOOD PRESSURE: 112 MMHG | HEART RATE: 72 BPM | TEMPERATURE: 97.2 F

## 2020-11-23 DIAGNOSIS — Z00.00 MEDICARE ANNUAL WELLNESS VISIT, SUBSEQUENT: ICD-10-CM

## 2020-11-23 DIAGNOSIS — G71.19 PARAMYOTONIA CONGENITA: ICD-10-CM

## 2020-11-23 DIAGNOSIS — R05.9 COUGH: Primary | ICD-10-CM

## 2020-11-23 DIAGNOSIS — G47.00 INSOMNIA, UNSPECIFIED TYPE: ICD-10-CM

## 2020-11-23 DIAGNOSIS — F41.9 ANXIETY: ICD-10-CM

## 2020-11-23 DIAGNOSIS — G90.521 COMPLEX REGIONAL PAIN SYNDROME TYPE 1 OF RIGHT LOWER EXTREMITY: ICD-10-CM

## 2020-11-23 PROCEDURE — 99214 OFFICE O/P EST MOD 30 MIN: CPT | Performed by: FAMILY MEDICINE

## 2020-11-23 PROCEDURE — G0402 INITIAL PREVENTIVE EXAM: HCPCS | Performed by: FAMILY MEDICINE

## 2020-11-23 RX ORDER — HYDROXYZINE PAMOATE 25 MG/1
25 CAPSULE ORAL EVERY 4 HOURS PRN
Qty: 100 CAPSULE | Refills: 5 | Status: CANCELLED | OUTPATIENT
Start: 2020-11-23

## 2020-11-23 RX ORDER — HYDROXYZINE PAMOATE 25 MG/1
25 CAPSULE ORAL EVERY 6 HOURS PRN
Qty: 100 CAPSULE | Refills: 5 | Status: SHIPPED | OUTPATIENT
Start: 2020-11-23 | End: 2021-05-03

## 2020-11-23 RX ORDER — ALPRAZOLAM 2 MG/1
TABLET ORAL
Qty: 150 TABLET | Refills: 3 | Status: SHIPPED | OUTPATIENT
Start: 2020-11-23 | End: 2021-03-22 | Stop reason: SDUPTHER

## 2020-11-23 RX ORDER — METHYLPREDNISOLONE 4 MG/1
TABLET ORAL
Qty: 21 EACH | Refills: 0 | Status: SHIPPED | OUTPATIENT
Start: 2020-11-23 | End: 2021-01-04

## 2020-11-23 RX ORDER — ALPRAZOLAM 2 MG/1
TABLET ORAL
Qty: 150 TABLET | Refills: 3 | Status: CANCELLED | OUTPATIENT
Start: 2020-11-23

## 2020-12-22 ENCOUNTER — TELEPHONE (OUTPATIENT)
Dept: FAMILY MEDICINE CLINIC | Facility: CLINIC | Age: 30
End: 2020-12-22

## 2021-01-04 ENCOUNTER — TELEMEDICINE (OUTPATIENT)
Dept: FAMILY MEDICINE CLINIC | Facility: CLINIC | Age: 31
End: 2021-01-04
Payer: MEDICARE

## 2021-01-04 VITALS — TEMPERATURE: 99.1 F

## 2021-01-04 DIAGNOSIS — J06.9 ACUTE URI: Primary | ICD-10-CM

## 2021-01-04 PROCEDURE — 99213 OFFICE O/P EST LOW 20 MIN: CPT | Performed by: FAMILY MEDICINE

## 2021-01-04 RX ORDER — METHYLPREDNISOLONE 4 MG/1
TABLET ORAL
Qty: 21 EACH | Refills: 0 | Status: SHIPPED | OUTPATIENT
Start: 2021-01-04 | End: 2021-03-23 | Stop reason: HOSPADM

## 2021-01-04 NOTE — PROGRESS NOTES
Virtual Regular Visit      Assessment/Plan:    Problem List Items Addressed This Visit        Respiratory    Acute URI - Primary               Reason for visit is   Chief Complaint   Patient presents with    Virtual Brief Visit     fatigue, congestion, sore throat, body aches x5-6 days  no known exposure    Virtual Regular Visit        Encounter provider Nancie Ring MD    Provider located at [facility] [address]  [place] [id number] [phone]      Recent Visits  No visits were found meeting these conditions  Showing recent visits within past 7 days and meeting all other requirements     Today's Visits  Date Type Provider Dept   01/04/21 Telemedicine Nancie Ring MD [facility] [address] today's visits and meeting all other requirements     Future Appointments  No visits were found meeting these conditions  Showing future appointments within next 150 days and meeting all other requirements        The patient was identified by name and date of birth  Tello Finch was informed that this is a telemedicine visit and that the visit is being conducted through [facility] and patient was informed that this is not a secure, HIPAA-compliant platform  He agrees to proceed     My office door was closed  No one else was in the room  He acknowledged consent and understanding of privacy and security of the video platform  The patient has agreed to participate and understands they can discontinue the visit at any time  Patient is aware this is a billable service  Subjective  Tello Finch is a 31 y o  male he complains of sinus congestion and headache  Patsi Reil       JI     Past Medical History:   Diagnosis Date    Abnormal NCS (nerve conduction studies)     Anxiety     Chronic pain disorder     Intervertebral disc disorder with radiculopathy of lumbosacral region 4/24/2019       Past Surgical History:   Procedure Laterality Date    ARTHROSCOPIC REPAIR ACL Right     KNEE ARTHROSCOPY Right     ACL    OTHER SURGICAL HISTORY      trial stimulator    FL PERCUT IMPLNT NEUROELECT,EPIDURAL Right 7/12/2019    Procedure: PLACEMENT DORSAL ROOT GANGLION (DRG) ELECTRODE RIGHT L5, S1 W IMPLANTABLE PULSE GENERATOR, RIGHT;  Surgeon: Saar Nolan MD;  Location:  MAIN OR;  Service: Neurosurgery    ROTATOR CUFF REPAIR Right        Current Outpatient Medications   Medication Sig Dispense Refill    acetaZOLAMIDE (DIAMOX) 250 mg tablet Take 250 mg by mouth 2 (two) times a day      ALPRAZolam (XANAX) 2 MG tablet 2 bid, 1 qhs 150 tablet 3    hydrOXYzine pamoate (VISTARIL) 25 mg capsule Take 1 capsule (25 mg total) by mouth every 6 (six) hours as needed for itching or anxiety 100 capsule 5     No current facility-administered medications for this visit  No Known Allergies    Review of Systems   Constitutional: Negative for fever  HENT: Positive for congestion  Respiratory: Negative  Gastrointestinal: Negative  Video Exam    Vitals:    01/04/21 1017   Temp: 99 1 °F (37 3 °C)       Physical Exam  Constitutional:       Appearance: Normal appearance  HENT:      Head: Normocephalic and atraumatic  Pulmonary:      Effort: Pulmonary effort is normal    Neurological:      Mental Status: He is alert and oriented to person, place, and time  Psychiatric:         Mood and Affect: Mood normal          Behavior: Behavior normal           I spent 15 minutes directly with the patient during this visit      VIRTUAL VISIT DISCLAIMER    Reza Esqueda acknowledges that he has consented to an online visit or consultation  He understands that the online visit is based solely on information provided by him, and that, in the absence of a face-to-face physical evaluation by the physician, the diagnosis he receives is both limited and provisional in terms of accuracy and completeness   This is not intended to replace a full medical face-to-face evaluation by the physician  Maria R Martínez understands and accepts these terms

## 2021-01-11 ENCOUNTER — TELEPHONE (OUTPATIENT)
Dept: FAMILY MEDICINE CLINIC | Facility: CLINIC | Age: 31
End: 2021-01-11

## 2021-01-11 NOTE — TELEPHONE ENCOUNTER
T/c with pt  He finished his steroid pack, but lost his taste and smell  He went to [facility] for a covid test - he is positive - just wants his doctor to be aware - he is following protocol and feels symptoms that are common with how far along into the virus that he is

## 2021-03-18 ENCOUNTER — TRANSCRIBE ORDERS (OUTPATIENT)
Dept: ADMINISTRATIVE | Facility: HOSPITAL | Age: 31
End: 2021-03-18

## 2021-03-18 DIAGNOSIS — G71.00 MUSCULAR DYSTROPHY (HCC): Primary | ICD-10-CM

## 2021-03-22 DIAGNOSIS — F41.9 ANXIETY: ICD-10-CM

## 2021-03-22 RX ORDER — ALPRAZOLAM 2 MG/1
TABLET ORAL
Qty: 150 TABLET | Refills: 3 | Status: SHIPPED | OUTPATIENT
Start: 2021-03-22 | End: 2021-07-19 | Stop reason: SDUPTHER

## 2021-03-22 NOTE — TELEPHONE ENCOUNTER
02/23/2021 2 11/23/2020 ALPRAZOLAM 2 MG TABLET 150 0 30 [facility]   [id number] PENNS ([id number]) 3 Medicare PA 01/23/2021 2 11/23/2020 ALPRAZOLAM 2 MG TABLET 150 0 30 [facility]   [id number] PENNS (4121) 2 Medicare PA 12/23/2020 2 11/23/2020 ALPRAZOLAM 2 MG TABLET 150 0 30 BR [facility]   [id number] PENNS (7260) 1 Medicare PA 11/23/2020 2 11/23/2020 ALPRAZOLAM 2 MG TABLET 150 0 30 BR [facility]   [id number] PENNS (5083) 0 Private Pay [place] 10/26/2020 2 07/29/2020 ALPRAZOLAM 2 MG TABLET 150 0 30 BR [facility]   [id number] PENNS (4629) 3 Private Pay [place] 09/25/2020 1 07/29/2020 ALPRAZOLAM 2 MG TABLET 150 0 30 BR [facility]   [id number] PENNS ([id number]) 2 Private Pay PA

## 2021-03-22 NOTE — TELEPHONE ENCOUNTER
Medication:  PDMP   02/23/2021  2  11/23/2020  ALPRAZOLAM 2 MG TABLET  150 0  30  BR GREGORIO  [id number]  PENNS ([id number])  3   Medicare  PA    01/23/2021  2  11/23/2020  ALPRAZOLAM 2 MG TABLET  150 0  30  BR GREGORIO  [id number]  PENNS (8923)  2   Medicare  PA    12/23/2020  2  11/23/2020  ALPRAZOLAM 2 MG TABLET  150 0  30  BR GREGORIO  [id number]  PENNS (3096)  1   Medicare  PA    11/23/2020  2  11/23/2020  ALPRAZOLAM 2 MG TABLET  150 0  30  BR GREGORIO  [id number]  PENNS (6463)  0   Private Pay  PA    10/26/2020  2  07/29/2020  ALPRAZOLAM 2 MG TABLET  150 0  30  BR GREGORIO  [id number]  PENNS ([id number])  3   Private Pay  PA        Active agreement on file -No

## 2021-03-23 ENCOUNTER — OFFICE VISIT (OUTPATIENT)
Dept: CARDIOLOGY CLINIC | Facility: CLINIC | Age: 31
End: 2021-03-23
Payer: MEDICARE

## 2021-03-23 VITALS
WEIGHT: 157 LBS | DIASTOLIC BLOOD PRESSURE: 60 MMHG | HEIGHT: 73 IN | BODY MASS INDEX: 20.81 KG/M2 | SYSTOLIC BLOOD PRESSURE: 100 MMHG | HEART RATE: 60 BPM

## 2021-03-23 DIAGNOSIS — G71.19 PARAMYOTONIA CONGENITA: Primary | ICD-10-CM

## 2021-03-23 DIAGNOSIS — R94.31 PROLONGED QT INTERVAL: ICD-10-CM

## 2021-03-23 PROCEDURE — 99203 OFFICE O/P NEW LOW 30 MIN: CPT | Performed by: NURSE PRACTITIONER

## 2021-03-23 NOTE — PROGRESS NOTES
Patient ID: Nati Lainez is a 36 y o  male  Plan:      Prolonged QT interval  No evidence of prolonged long QT noted on EKG  No family history of SCD  Recommend EKG monitoring 1-2 weeks after initiating mexiletine     Paramyotonia congenita  Follows with Dr Makenzie Flores at [facility]  Weaning from acetazolamide in preparation to begin mexiletine        Follow up Plan/Summary Comments:  No evidence QT prolongation on the EKG the patient brought with him today  There is no family history of sudden cardiac death  Recommend EKG with QT measurement 1-2 weeks following initiation of mexiletine and then periodically       HPI:    had the pleasure of meeting Chad Dakin in the office today at the request of Dr Sandy Hamilton of [facility]  Chad Dakin is a pleasant 44-year-old male with a history of paramyotonia congenita and complex regional pain syndrome of the right lower extremity  He is currently maintained on Vistaril,  Xanax, and acetazolamide and has a DRG stimulator in the right lower back  As a result of these conditions, Chad Dakin unfortunately suffers tremendous pain and muscle spasms and weakness  His specialist is planning to wean him from acetazolamide and transition to mexiletine to see if he will have any relief  He reportedly had a prolonged QT interval on a prior EKG from 2017  This is not available to me at this time  He is here to  obtain clearance with an EKG prior to starting this new medication  He denies any known cardiac history and there is no family history of SCD  Chad Dakin denies any chest pain, shortness of breath, lightheadedness, syncope  He does occasionally have dizziness which he was told is a side effect of weaning off acetazolamide  Family history, no significant cardiac disease     Social history- he previously smoked and currently uses vape    No alcohol or drug use    Review of Systems   10  point ROS  was otherwise non pertinent or negative except as per HPI or as below    Gait: Normal      Most recent or relevant cardiac/vascular testing:    Echo 6/26/2018 EF 50%, mild TR    EKG SB, 54 QTc 400 ms    Objective:     /60   Pulse 60   Ht 6' 1" (1 854 m)   Wt 71 2 kg (157 lb)   BMI 20 71 kg/m²     PHYSICAL EXAM:    General:  Normal appearance, no acute distress  Eyes:  Anicteric  Oral mucosa:  Wearing a mask  Neck:  No JVD  Carotid upstrokes are brisk without bruits  No masses  Chest:  Clear to auscultation   Cardiac:  No palpable PMI  Normal S1 and S2  No murmur gallop or rub  Abdomen:  Soft and nontender  No palpable organomegaly or aortic enlargement  Extremities:  No peripheral edema  Musculoskeletal:  Symmetric  Vascular:  Pedal pulses are intact  Neuro:  Grossly symmetric  Psych:  Alert and oriented x3      No Known Allergies    Current Outpatient Medications:     acetaZOLAMIDE (DIAMOX) 250 mg tablet, Take 125 mg by mouth 2 (two) times a day , Disp: , Rfl:     ALPRAZolam (XANAX) 2 MG tablet, 2 bid, 1 qhs, Disp: 150 tablet, Rfl: 3    hydrOXYzine pamoate (VISTARIL) 25 mg capsule, Take 1 capsule (25 mg total) by mouth every 6 (six) hours as needed for itching or anxiety, Disp: 100 capsule, Rfl: 5  Past Medical History:   Diagnosis Date    Abnormal NCS (nerve conduction studies)     Anxiety     Chronic pain disorder     Intervertebral disc disorder with radiculopathy of lumbosacral region 4/24/2019     Past Surgical History:   Procedure Laterality Date    ARTHROSCOPIC REPAIR ACL Right     KNEE ARTHROSCOPY Right     ACL    OTHER SURGICAL HISTORY      trial stimulator    FL PERCUT IMPLNT NEUROELECT,EPIDURAL Right 7/12/2019    Procedure: PLACEMENT DORSAL ROOT GANGLION (DRG) ELECTRODE RIGHT L5, S1 W IMPLANTABLE PULSE GENERATOR, RIGHT;  Surgeon: Amy Carroll MD;  Location: [facility];  Service: Neurosurgery    ROTATOR CUFF REPAIR Right        CMP:   Lab Results   Component Value Date    K 3 6 07/29/2020     07/29/2020    CO2 25 07/29/2020 BUN 17 07/29/2020    CREATININE 0 94 07/29/2020    EGFR 110 07/29/2020     Lipid Profile:  No results found for: CHOL, TRIG, HDL, LDL      Social History     Tobacco Use   Smoking Status Former Smoker   Smokeless Tobacco Former User   Tobacco Comment    encourged smoking cessation

## 2021-03-23 NOTE — ASSESSMENT & PLAN NOTE
Follows with Dr Rosy Alves at [facility]    Weaning from acetazolamide in preparation to begin mexiletine

## 2021-03-23 NOTE — ASSESSMENT & PLAN NOTE
No evidence of prolonged long QT noted on EKG  No family history of SCD  Recommend EKG monitoring 1-2 weeks after initiating mexiletine

## 2021-03-30 ENCOUNTER — CLINICAL SUPPORT (OUTPATIENT)
Dept: NUTRITION | Facility: HOSPITAL | Age: 31
End: 2021-03-30
Payer: MEDICARE

## 2021-03-30 DIAGNOSIS — R63.4 WEIGHT LOSS: ICD-10-CM

## 2021-03-30 NOTE — PROGRESS NOTES
Initial Nutrition Assessment Form    Patient Name: Kayden Justin    YOB: 1990    Sex: Male     Assessment Date: 3/30/2021  Start Time: 1500 Stop Time: 1530 Total Minutes: 30     Data:  Present at session: self   Patient Concerns: "Unable to gain weight and keep losing even though eating same"   Medical Dx/Reason for Referral: Muscular dystrophy ([address] ) (G71 00)   Past Medical History:   Diagnosis Date    Abnormal NCS (nerve conduction studies)     Anxiety     Chronic pain disorder     Intervertebral disc disorder with radiculopathy of lumbosacral region 4/24/2019       Current Outpatient Medications   Medication Sig Dispense Refill    acetaZOLAMIDE (DIAMOX) 250 mg tablet Take 125 mg by mouth 2 (two) times a day       ALPRAZolam (XANAX) 2 MG tablet 2 bid, 1 qhs 150 tablet 3    hydrOXYzine pamoate (VISTARIL) 25 mg capsule Take 1 capsule (25 mg total) by mouth every 6 (six) hours as needed for itching or anxiety 100 capsule 5     No current facility-administered medications for this visit  Additional Meds/Supplements: Patient reported Vitamin C, Vitamin D "here and there"   Special Learning Needs: None   Height: 6'1" as of 3/23/21   Weight: Wt Readings from Last 3 Encounters:   03/23/21 71 2 kg (157 lb)   11/23/20 73 9 kg (163 lb)   11/03/20 70 8 kg (156 lb)     There is no height or weight on file to calculate BMI  Recent Weight Change: [x]Yes     []No  Amount: Patient reported history of "80 lb weight loss x 2 years; currently losing around 1 lb every 3 months"; per Chart review-weight loss of 6 lb x 4 months from 11/23/20-3/23/21 (loss in 4 months not significant at 3 6%, and seems to have slowed)        Energy Needs: Lida Sas Equation:  1464-8898 calories based on Activity Factor of 1 3 and Stress Factor of 1 3    No Known Allergies    Social History     Substance and Sexual Activity   Alcohol Use Never    Frequency: Never       Social History     Tobacco Use   Smoking Status Former Smoker   Smokeless Tobacco Former User   Tobacco Comment    encourged smoking cessation       Who shops? Patient, Girlfriend   Who cooks? Patient, Girlfriend (Girlfriend mostly)   Exercise: Limited activity related to medical condition   Prior Counseling? []Yes     [x]No  When: NA   Why: NA        Diet Hx:  Breakfast:  a m  Day 1: 2 eggs, 2 pieces buttered toast, 1/2 can corned beef hash, 16 9 fl oz water bottle  Day 2: 2 mini chaffels (egg, mozzarella cheese, almond flour) and syrup, 1/2 kiwi, 1/2 can corned beef hash, 1/2 piece of toast  Day 3: Bowl Honey Nut Cheerios and whole milk   Daytime Snacks:  p m  Day 1: Doritos, 1 SmartWater (24 oz), 1 [employer] Peanut Butter Bar  Day 2: Pretzels, 1 Meat stick, 2 cheese sticks, 1 [employer] Peanut Butter Bar, 1 Michael [employer]ms Soft Baked Bar  Day 3: LUNCH: Burger with bun, cheese, ketchup and hawk, steamed broccoli with salt and pepper, 16 9 fl oz water  Day 3: PM Snack 1 piece chicken piccata       Dinner:  p m     Day 1: Homemade pizza with sauce, chicken, ferrari; homemade potato, chicken, cheese casserole  Day 2: Mashed potatoes, Roasted asparagus with parmesan, Chicken Piccata with pasta, 16 9 fl oz water, Pistachio Gelato  Day 3: 1 Regular taco with beef, sour cream, cheese, guacamole; 1 Doritos taco with beef, sour cream, cheese, guacamole, 16 9 fl oz water         Nighttime Snacks: Day 1: 2 bottles of water (16 9 fl oz), 1 White chocolate macadamia nut [employer], Fruit snacks, 1 Peanut Butter Chocolate Chip Chewy Bar    Day 2: Piece of ice cream cake, 2 Fruit Snacks, 1 Oatmeal Raisin Chewy Bar, 1 White Chocolate Macadamia Nut Raf Bar    Day 3: 1 bottle water (16 9 fl oz), 1 Fruit Snack, 1 Oatmeal Raisin Chewy Bar, 1 Peanut Butter Chocolate Chip Chewy Bar        Nutrition Diagnosis:   Predicted suboptimal energy intakes related to increased protein/energy needs and demands related to medical condition as evidenced by reported and documented weight loss with average daily energy intakes of 0279-8610 calories as per 3 Day Food Record (Day 1 estimate 2270 calories, Day 2 estimate 3180 calories, Day 3 estimate 1940 calories as recorded)  Medical Nutrition Therapy Intervention:  [x]Individualized Meal Plan []Understanding Lab Values   []Basic Pathophysiology of Disease []Food/Medication Interactions   []Food Diary []Exercise   []Lifestyle/Behavior Modification Techniques []Medication, Mechanism of Action   []Label Reading []Self Blood Glucose Monitoring   []Weight/BMI Goals [x]Other - High Calorie/High Protein Nutrition Therapy, 3000-Calorie individualized meal plan, general healthful nutrition tips to maximize intakes   Other Notes: Patient presents with significant distress, frustration over inability to stop ongoing weight loss despite maintaining diet and intakes  3 Day Food Record reviewed with Patient and Girlfriend  Girlfriend analyzed daily caloric intakes on Record using mostly Food Labels, "reading carefully", per Girlfriend  As above, Girlfriend estimated caloric intakes as 2270, 3180, 1940 for the 3 Days respectively  Patient discussed losing "a lot of muscle, I used to be bulky, bigger"  RD estimated protein intakes to be approximately  grams/day based on Food Record above, or approximately 1 26-1 5 grams/kg  Patient may require more calories/protein given that continues to lose weight including "muscle" per Patient, appreciating Patient with Muscular Dystrophy as per Nutrition Referral          Comprehension: [x]Excellent  []Very Good  []Good  []Fair   []Poor    Receptivity: [x]Excellent  []Very Good  []Good  []Fair   []Poor    Expected Compliance: [x]Excellent  []Very Good  []Good  []Fair   []Poor        Goals:  1  Follow 3000 calorie meal plan     2  Consume large protein portions with meals, include high quality proteins with all meals and snacks (swap Cereal Bars for Raf Bar or equivalent that provides more protein and calories, more nutrition)  3  Add 2-3 Ensure High Protein daily, consume throughout day, between meals  No follow-ups on file    Labs:  CMP  Lab Results   Component Value Date    K 3 6 07/29/2020     07/29/2020    CO2 25 07/29/2020    BUN 17 07/29/2020    CREATININE 0 94 07/29/2020    GLUF 89 07/29/2020    CALCIUM 9 5 07/29/2020    AST 16 07/29/2020    ALT 14 07/29/2020    ALKPHOS 28 2 07/29/2020    EGFR 110 07/29/2020       BMP  Lab Results   Component Value Date    CALCIUM 9 5 07/29/2020    K 3 6 07/29/2020    CO2 25 07/29/2020     07/29/2020    BUN 17 07/29/2020    CREATININE 0 94 07/29/2020       Lipids  No results found for: CHOL  No results found for: HDL  No results found for: LDLCALC  No results found for: TRIG  No results found for: CHOLHDL    Hemoglobin A1C  Lab Results   Component Value Date    HGBA1C 4 8 07/08/2019       Fasting Glucose  Lab Results   Component Value Date    GLUF 89 07/29/2020       Insulin     Thyroid  No results found for: TSH, V5NPQSN, W6DAVLA, THYROIDAB    Hepatic Function Panel  Lab Results   Component Value Date    ALT 14 07/29/2020    AST 16 07/29/2020    ALKPHOS 28 2 07/29/2020       Celiac Disease Antibody Panel  No results found for: ENDOMYSIAL IGA, GLIADIN IGA, GLIADIN IGG, IGA, TISSUE TRANSGLUT AB, TTG IGA   Iron  No results found for: IRON, TIBC, FERRITIN    Vitamins  No results found for: VITAMIN B2   No results found for: NICOTINAMIDE, NICOTINIC ACID   No results found for: VITAMINB6  No results found for: LANIQSUK21  No results found for: VITB5  No results found for: J8YOVMLM  No results found for: THYROGLB  No results found for: VITAMIN K   No results found for: 25-HYDROXY VIT D   No components found for: VITAMINE     Gamaliel Vera MS, RD, [address] [address]

## 2021-05-03 DIAGNOSIS — F41.9 ANXIETY: ICD-10-CM

## 2021-05-03 RX ORDER — HYDROXYZINE PAMOATE 25 MG/1
25 CAPSULE ORAL EVERY 6 HOURS PRN
Qty: 100 CAPSULE | Refills: 5 | Status: SHIPPED | OUTPATIENT
Start: 2021-05-03 | End: 2021-10-02

## 2021-05-10 ENCOUNTER — OFFICE VISIT (OUTPATIENT)
Dept: FAMILY MEDICINE CLINIC | Facility: CLINIC | Age: 31
End: 2021-05-10
Payer: MEDICARE

## 2021-05-10 VITALS
HEART RATE: 82 BPM | DIASTOLIC BLOOD PRESSURE: 60 MMHG | OXYGEN SATURATION: 95 % | WEIGHT: 160 LBS | HEIGHT: 73 IN | TEMPERATURE: 97 F | BODY MASS INDEX: 21.2 KG/M2 | SYSTOLIC BLOOD PRESSURE: 110 MMHG

## 2021-05-10 DIAGNOSIS — G90.521 COMPLEX REGIONAL PAIN SYNDROME TYPE 1 OF RIGHT LOWER EXTREMITY: Primary | ICD-10-CM

## 2021-05-10 DIAGNOSIS — F41.9 ANXIETY: ICD-10-CM

## 2021-05-10 DIAGNOSIS — G47.00 INSOMNIA, UNSPECIFIED TYPE: ICD-10-CM

## 2021-05-10 DIAGNOSIS — G71.19 PARAMYOTONIA CONGENITA: ICD-10-CM

## 2021-05-10 PROBLEM — R63.4 WEIGHT LOSS: Status: RESOLVED | Noted: 2018-05-03 | Resolved: 2021-05-10

## 2021-05-10 PROBLEM — R05.9 COUGH: Status: RESOLVED | Noted: 2020-11-23 | Resolved: 2021-05-10

## 2021-05-10 PROBLEM — J06.9 ACUTE URI: Status: RESOLVED | Noted: 2020-01-06 | Resolved: 2021-05-10

## 2021-05-10 PROCEDURE — 99214 OFFICE O/P EST MOD 30 MIN: CPT | Performed by: FAMILY MEDICINE

## 2021-05-10 RX ORDER — NORTRIPTYLINE HYDROCHLORIDE 10 MG/1
CAPSULE ORAL
COMMUNITY
Start: 2021-04-28 | End: 2021-08-09 | Stop reason: ALTCHOICE

## 2021-05-10 NOTE — PROGRESS NOTES
Assessment/Plan:         Problem List Items Addressed This Visit        Nervous and Auditory    Complex regional pain syndrome type 1 of right lower extremity - Primary       Continue  Nortriptyline 10 mg q h s  Musculoskeletal and Integument    Paramyotonia congenita       Other    Anxiety      Continue Xanax 1 mg q i d  and hydroxyzine as needed         Insomnia            Subjective:      Patient ID: Destiny Ulrich is a 36 y o  male  Patient comes in for checkup  He is now seeing doctors [facility] for pain management and Neurology  He has been taken off of his CTs all my and started on nortriptyline to see if this will decrease his pain  He continues to take the Xanax regularly and hydroxyzine as needed for his anxiety  The following portions of the patient's history were reviewed and updated as appropriate:   Past Medical History:  He has a past medical history of Abnormal NCS (nerve conduction studies), Anxiety, Chronic pain disorder, and Intervertebral disc disorder with radiculopathy of lumbosacral region (4/24/2019)  ,  _______________________________________________________________________  Medical Problems:  does not have any pertinent problems on file ,  _______________________________________________________________________  Past Surgical History:   has a past surgical history that includes Arthroscopic repair ACL (Right); Rotator cuff repair (Right); Other surgical history; Knee arthroscopy (Right); and pr percut implnt neuroelect,epidural (Right, 7/12/2019)  ,  _______________________________________________________________________  Family History:  family history includes No Known Problems in his father and mother ,  _______________________________________________________________________  Social History:   reports that he has quit smoking   He has quit using smokeless tobacco  He reports that he does not drink alcohol or use drugs ,  _______________________________________________________________________  Allergies:  has No Known Allergies     _______________________________________________________________________  Current Outpatient Medications   Medication Sig Dispense Refill    ALPRAZolam (XANAX) 2 MG tablet 2 bid, 1 qhs 150 tablet 3    hydrOXYzine pamoate (VISTARIL) 25 mg capsule TAKE 1 CAPSULE (25 MG TOTAL) BY MOUTH EVERY 6 (SIX) HOURS AS NEEDED FOR ITCHING OR ANXIETY 100 capsule 5    nortriptyline (PAMELOR) 10 mg capsule        No current facility-administered medications for this visit       _______________________________________________________________________  Review of Systems   Constitutional: Negative  Respiratory: Negative  Cardiovascular: Negative  Psychiatric/Behavioral: The patient is nervous/anxious  Objective:  Vitals:    05/10/21 1019   BP: 110/60   BP Location: Left arm   Patient Position: Sitting   Cuff Size: Adult   Pulse: 82   Temp: (!) 97 °F (36 1 °C)   TempSrc: Tympanic   SpO2: 95%   Weight: 72 6 kg (160 lb)   Height: 6' 1" (1 854 m)     Body mass index is 21 11 kg/m²  Physical Exam  Constitutional:       Appearance: He is well-developed  HENT:      Head: Normocephalic and atraumatic  Eyes:      Pupils: Pupils are equal, round, and reactive to light  Neck:      Musculoskeletal: Neck supple  Cardiovascular:      Rate and Rhythm: Normal rate and regular rhythm  Heart sounds: Normal heart sounds  Pulmonary:      Effort: Pulmonary effort is normal       Breath sounds: Normal breath sounds  Abdominal:      General: Bowel sounds are normal       Palpations: Abdomen is soft  Musculoskeletal: Normal range of motion  Skin:     General: Skin is warm and dry  Neurological:      Mental Status: He is alert and oriented to person, place, and time  Psychiatric:         Thought Content:  Thought content normal

## 2021-05-21 ENCOUNTER — OFFICE VISIT (OUTPATIENT)
Dept: LAB | Facility: HOSPITAL | Age: 31
End: 2021-05-21
Payer: MEDICARE

## 2021-05-21 ENCOUNTER — TRANSCRIBE ORDERS (OUTPATIENT)
Dept: ADMINISTRATIVE | Facility: HOSPITAL | Age: 31
End: 2021-05-21

## 2021-05-21 DIAGNOSIS — Z79.899 ENCOUNTER FOR LONG-TERM (CURRENT) USE OF OTHER MEDICATIONS: ICD-10-CM

## 2021-05-21 DIAGNOSIS — G90.521 REFLEX SYMPATHETIC DYSTROPHY OF RIGHT LEG: ICD-10-CM

## 2021-05-21 DIAGNOSIS — G71.19 PARAMYOTONIA CONGENITA: Primary | ICD-10-CM

## 2021-05-21 DIAGNOSIS — G71.19 PARAMYOTONIA CONGENITA: ICD-10-CM

## 2021-05-21 LAB
ATRIAL RATE: 85 BPM
P AXIS: 52 DEGREES
PR INTERVAL: 142 MS
QRS AXIS: 49 DEGREES
QRSD INTERVAL: 84 MS
QT INTERVAL: 350 MS
QTC INTERVAL: 416 MS
T WAVE AXIS: 34 DEGREES
VENTRICULAR RATE: 85 BPM

## 2021-05-21 PROCEDURE — 93010 ELECTROCARDIOGRAM REPORT: CPT | Performed by: INTERNAL MEDICINE

## 2021-05-21 PROCEDURE — 93005 ELECTROCARDIOGRAM TRACING: CPT

## 2021-06-15 ENCOUNTER — APPOINTMENT (OUTPATIENT)
Dept: LAB | Facility: HOSPITAL | Age: 31
End: 2021-06-15
Payer: MEDICARE

## 2021-06-15 ENCOUNTER — TRANSCRIBE ORDERS (OUTPATIENT)
Dept: ADMINISTRATIVE | Facility: HOSPITAL | Age: 31
End: 2021-06-15

## 2021-06-15 ENCOUNTER — OFFICE VISIT (OUTPATIENT)
Dept: LAB | Facility: HOSPITAL | Age: 31
End: 2021-06-15
Payer: MEDICARE

## 2021-06-15 DIAGNOSIS — Z79.899 ENCOUNTER FOR LONG-TERM (CURRENT) USE OF OTHER MEDICATIONS: ICD-10-CM

## 2021-06-15 DIAGNOSIS — G90.521 REFLEX SYMPATHETIC DYSTROPHY OF RIGHT LEG: Primary | ICD-10-CM

## 2021-06-15 DIAGNOSIS — G90.521 REFLEX SYMPATHETIC DYSTROPHY OF RIGHT LEG: ICD-10-CM

## 2021-06-15 LAB
ALBUMIN SERPL BCP-MCNC: 4.6 G/DL (ref 3.5–5)
ALP SERPL-CCNC: 50 U/L (ref 46–116)
ALT SERPL W P-5'-P-CCNC: 41 U/L (ref 12–78)
ANION GAP SERPL CALCULATED.3IONS-SCNC: 6 MMOL/L (ref 4–13)
AST SERPL W P-5'-P-CCNC: 25 U/L (ref 5–45)
ATRIAL RATE: 87 BPM
BASOPHILS # BLD AUTO: 0.03 THOUSANDS/ΜL (ref 0–0.1)
BASOPHILS NFR BLD AUTO: 1 % (ref 0–1)
BILIRUB SERPL-MCNC: 0.87 MG/DL (ref 0.2–1)
BUN SERPL-MCNC: 16 MG/DL (ref 5–25)
CALCIUM SERPL-MCNC: 8.8 MG/DL (ref 8.3–10.1)
CHLORIDE SERPL-SCNC: 104 MMOL/L (ref 100–108)
CO2 SERPL-SCNC: 35 MMOL/L (ref 21–32)
CREAT SERPL-MCNC: 0.81 MG/DL (ref 0.6–1.3)
EOSINOPHIL # BLD AUTO: 0.05 THOUSAND/ΜL (ref 0–0.61)
EOSINOPHIL NFR BLD AUTO: 1 % (ref 0–6)
ERYTHROCYTE [DISTWIDTH] IN BLOOD BY AUTOMATED COUNT: 12.2 % (ref 11.6–15.1)
GFR SERPL CREATININE-BSD FRML MDRD: 120 ML/MIN/1.73SQ M
GLUCOSE SERPL-MCNC: 96 MG/DL (ref 65–140)
HCT VFR BLD AUTO: 46.9 % (ref 36.5–49.3)
HGB BLD-MCNC: 16.1 G/DL (ref 12–17)
IMM GRANULOCYTES # BLD AUTO: 0.01 THOUSAND/UL (ref 0–0.2)
IMM GRANULOCYTES NFR BLD AUTO: 0 % (ref 0–2)
LYMPHOCYTES # BLD AUTO: 0.82 THOUSANDS/ΜL (ref 0.6–4.47)
LYMPHOCYTES NFR BLD AUTO: 21 % (ref 14–44)
MCH RBC QN AUTO: 30.7 PG (ref 26.8–34.3)
MCHC RBC AUTO-ENTMCNC: 34.3 G/DL (ref 31.4–37.4)
MCV RBC AUTO: 90 FL (ref 82–98)
MONOCYTES # BLD AUTO: 0.31 THOUSAND/ΜL (ref 0.17–1.22)
MONOCYTES NFR BLD AUTO: 8 % (ref 4–12)
NEUTROPHILS # BLD AUTO: 2.63 THOUSANDS/ΜL (ref 1.85–7.62)
NEUTS SEG NFR BLD AUTO: 69 % (ref 43–75)
NRBC BLD AUTO-RTO: 0 /100 WBCS
P AXIS: 34 DEGREES
PLATELET # BLD AUTO: 149 THOUSANDS/UL (ref 149–390)
PMV BLD AUTO: 9.5 FL (ref 8.9–12.7)
POTASSIUM SERPL-SCNC: 4.3 MMOL/L (ref 3.5–5.3)
PR INTERVAL: 152 MS
PROT SERPL-MCNC: 7.5 G/DL (ref 6.4–8.2)
QRS AXIS: 25 DEGREES
QRSD INTERVAL: 80 MS
QT INTERVAL: 366 MS
QTC INTERVAL: 440 MS
RBC # BLD AUTO: 5.24 MILLION/UL (ref 3.88–5.62)
SODIUM SERPL-SCNC: 145 MMOL/L (ref 136–145)
T WAVE AXIS: 13 DEGREES
VENTRICULAR RATE: 87 BPM
WBC # BLD AUTO: 3.85 THOUSAND/UL (ref 4.31–10.16)

## 2021-06-15 PROCEDURE — 80053 COMPREHEN METABOLIC PANEL: CPT

## 2021-06-15 PROCEDURE — 36415 COLL VENOUS BLD VENIPUNCTURE: CPT

## 2021-06-15 PROCEDURE — 85025 COMPLETE CBC W/AUTO DIFF WBC: CPT

## 2021-06-15 PROCEDURE — 93010 ELECTROCARDIOGRAM REPORT: CPT | Performed by: INTERNAL MEDICINE

## 2021-06-15 PROCEDURE — 93005 ELECTROCARDIOGRAM TRACING: CPT

## 2021-07-19 DIAGNOSIS — F41.9 ANXIETY: ICD-10-CM

## 2021-07-19 RX ORDER — ALPRAZOLAM 2 MG/1
TABLET ORAL
Qty: 150 TABLET | Refills: 3 | Status: SHIPPED | OUTPATIENT
Start: 2021-07-19 | End: 2021-11-17 | Stop reason: SDUPTHER

## 2021-07-19 NOTE — TELEPHONE ENCOUNTER
07/09/2021 2 07/09/2021   ACETAMINOPHEN-COD #3 TABLET  20 0 4 DA MOG   [id number]  PENNS ([id number]) 0 22 5 MME [place]    07/06/2021 2 07/06/2021   ACETAMINOPHEN-COD #3 TABLET  12 0 2 DA MOG   [id number]  PENNS ([id number]) 0 27  0 MME [place]    06/22/2021 2 03/22/2021   ALPRAZOLAM 2 MG TABLET  150 0 30 BR GREGORIO   [id number]  PENNS ([id number]) 3  Medicare [place]    05/22/2021 2 03/22/2021   ALPRAZOLAM 2 MG TABLET  150 0 30 BR GREGORIO   [id number]  PENNS ([id number]) 2  [place]are [place]    04/22/2021 2 03/22/2021   ALPRAZOLAM 2 MG TABLET  150 0 30 BR GREGORIO   [id number]  PENNS ([id number]) 1  [place]icare [place]

## 2021-08-09 ENCOUNTER — OFFICE VISIT (OUTPATIENT)
Dept: CARDIOLOGY CLINIC | Facility: CLINIC | Age: 31
End: 2021-08-09
Payer: MEDICARE

## 2021-08-09 ENCOUNTER — HOSPITAL ENCOUNTER (OUTPATIENT)
Dept: NON INVASIVE DIAGNOSTICS | Facility: CLINIC | Age: 31
Discharge: HOME | End: 2021-08-09
Payer: MEDICARE

## 2021-08-09 VITALS
HEART RATE: 70 BPM | HEIGHT: 73 IN | WEIGHT: 169 LBS | DIASTOLIC BLOOD PRESSURE: 60 MMHG | BODY MASS INDEX: 22.4 KG/M2 | SYSTOLIC BLOOD PRESSURE: 106 MMHG

## 2021-08-09 DIAGNOSIS — I49.9 ARRHYTHMIA: ICD-10-CM

## 2021-08-09 DIAGNOSIS — R94.31 PROLONGED QT INTERVAL: ICD-10-CM

## 2021-08-09 DIAGNOSIS — G71.19 PARAMYOTONIA CONGENITA: Primary | ICD-10-CM

## 2021-08-09 PROCEDURE — 93225 XTRNL ECG REC<48 HRS REC: CPT

## 2021-08-09 PROCEDURE — 99214 OFFICE O/P EST MOD 30 MIN: CPT | Performed by: NURSE PRACTITIONER

## 2021-08-09 PROCEDURE — 93000 ELECTROCARDIOGRAM COMPLETE: CPT | Performed by: NURSE PRACTITIONER

## 2021-08-09 PROCEDURE — 93226 XTRNL ECG REC<48 HR SCAN A/R: CPT

## 2021-08-09 RX ORDER — MEXILETINE HYDROCHLORIDE 150 MG/1
150 CAPSULE ORAL 2 TIMES DAILY
COMMUNITY
Start: 2021-08-01

## 2021-08-09 NOTE — ASSESSMENT & PLAN NOTE
Follows with Dr Enedelia Perkins at [facility]      Continue routine EKG monitoring with use of mexiletine

## 2021-08-09 NOTE — ASSESSMENT & PLAN NOTE
No evidence of prolonged long QT noted on EKG  No family history of SCD  Recommend periodic EKG monitoring

## 2021-08-09 NOTE — PROGRESS NOTES
Patient ID: Angie Whitney is a 36 y o  male  Plan:      Paramyotonia congenita  Follows with Dr Jay Keller at [facility]  Continue routine EKG monitoring with use of mexiletine     Prolonged QT interval  No evidence of prolonged long QT noted on EKG  No family history of SCD  Recommend periodic EKG monitoring        Follow up Plan/Summary Comments:  Darya Gill is stable from a cardiac perspective  His EKG shows sinus arrhythmia which is a normal variant  There is no prolongation of the QT interval   He remains asymptomatic from a cardiac standpoint  He will have a Holter monitor placed as ordered by his neurologist, Dr Heidy Anderson  Would recommend periodic EKG monitoring for QT prolongation  Follow-up on a p r n basis  HPI:  I had the pleasure seeing Darya Gill in the office today accompanied by his fiancee  He was referred to our office several months ago by Dr Jay Keller at [facility]  His evaluation was in preparation to begin mexiletine therapy for his paramyotonia congenita and CRPS of the RLE  Since his last visit with me in March, he has been weaned from  acetazolamide and started on mexiletine  Prior to the initiation of this medication, he had an EKG done  This was unremarkable  There is no known family history of sudden cardiac death  Recommendation was made to obtain an EKG with QT measurement 1-2 weeks after the initiation of mexiletine  Darya Gill reports that his 1st EKG was fine, however the EKG completed approximately 1 month after the initiation of this medication showed sinus arrhythmia  It is for this reason he was referred back to our office  He denies any cardiac symptoms including palpitations, chest discomfort, lightheadedness, dizziness, syncope  Review of Systems   10  point ROS  was otherwise non pertinent or negative except as per HPI or as below     Gait: Normal      Most recent or relevant cardiac/vascular testing:    Echo 6/26/2018 EF 50%, mild TR Results for orders placed or performed in visit on 08/09/21   POCT ECG    Impression    SR with sinus arrhythmia, 71  QT interval 376 ms           Objective:     /60   Pulse 70   Ht 6' 1" (1 854 m)   Wt 76 7 kg (169 lb)   BMI 22 30 kg/m²     PHYSICAL EXAM:    General:  Normal appearance, no acute distress  Eyes:  Anicteric  Oral mucosa:  Wearing a mask  Neck:  No JVD  Carotid upstrokes are brisk without bruits  No masses  Chest:  Clear to auscultation   Cardiac:  No palpable PMI  Normal S1 and S2  No murmur gallop or rub  Abdomen:  Soft and nontender  No palpable organomegaly or aortic enlargement  Extremities:  No peripheral edema  Musculoskeletal:  Symmetric  Vascular:  Pedal pulses are intact  Neuro:  Grossly symmetric  Psych:  Alert and oriented x3      No Known Allergies    Current Outpatient Medications:     ALPRAZolam (XANAX) 2 MG tablet, 2 bid, 1 qhs, Disp: 150 tablet, Rfl: 3    hydrOXYzine pamoate (VISTARIL) 25 mg capsule, TAKE 1 CAPSULE (25 MG TOTAL) BY MOUTH EVERY 6 (SIX) HOURS AS NEEDED FOR ITCHING OR ANXIETY, Disp: 100 capsule, Rfl: 5    mexiletine (MEXITIL) 150 mg capsule, Take 150 mg by mouth 2 (two) times a day, Disp: , Rfl:   Past Medical History:   Diagnosis Date    Abnormal NCS (nerve conduction studies)     Anxiety     Chronic pain disorder     Intervertebral disc disorder with radiculopathy of lumbosacral region 4/24/2019     Past Surgical History:   Procedure Laterality Date    ARTHROSCOPIC REPAIR ACL Right     KNEE ARTHROSCOPY Right     ACL    OTHER SURGICAL HISTORY      trial stimulator    DE PERCUT IMPLNT NEUROELECT,EPIDURAL Right 7/12/2019    Procedure: PLACEMENT DORSAL ROOT GANGLION (DRG) ELECTRODE RIGHT L5, S1 W IMPLANTABLE PULSE GENERATOR, RIGHT;  Surgeon: Niurka De La Torre MD;  Location:  MAIN OR;  Service: Neurosurgery    ROTATOR CUFF REPAIR Right        CMP:   Lab Results   Component Value Date    K 4 3 06/15/2021     06/15/2021    CO2 35 (H) 06/15/2021    BUN 16 06/15/2021    CREATININE 0 81 06/15/2021    EGFR 120 06/15/2021     Lipid Profile:  No results found for: CHOL, TRIG, HDL, LDL      Social History     Tobacco Use   Smoking Status Former Smoker   Smokeless Tobacco Former User   Tobacco Comment    encourged smoking cessation

## 2021-08-16 PROCEDURE — 93227 XTRNL ECG REC<48 HR R&I: CPT | Performed by: INTERNAL MEDICINE

## 2021-10-02 DIAGNOSIS — F41.9 ANXIETY: ICD-10-CM

## 2021-10-02 RX ORDER — HYDROXYZINE PAMOATE 25 MG/1
25 CAPSULE ORAL EVERY 6 HOURS PRN
Qty: 100 CAPSULE | Refills: 5 | Status: SHIPPED | OUTPATIENT
Start: 2021-10-02 | End: 2022-02-26

## 2021-10-13 ENCOUNTER — OFFICE VISIT (OUTPATIENT)
Dept: FAMILY MEDICINE CLINIC | Facility: CLINIC | Age: 31
End: 2021-10-13
Payer: MEDICARE

## 2021-10-13 VITALS
DIASTOLIC BLOOD PRESSURE: 72 MMHG | SYSTOLIC BLOOD PRESSURE: 118 MMHG | TEMPERATURE: 98 F | HEIGHT: 73 IN | OXYGEN SATURATION: 99 % | WEIGHT: 159.2 LBS | HEART RATE: 65 BPM | BODY MASS INDEX: 21.1 KG/M2

## 2021-10-13 DIAGNOSIS — G90.521 COMPLEX REGIONAL PAIN SYNDROME TYPE 1 OF RIGHT LOWER EXTREMITY: Primary | ICD-10-CM

## 2021-10-13 DIAGNOSIS — G71.19 PARAMYOTONIA CONGENITA: ICD-10-CM

## 2021-10-13 DIAGNOSIS — F41.9 ANXIETY: ICD-10-CM

## 2021-10-13 DIAGNOSIS — R94.31 PROLONGED QT INTERVAL: ICD-10-CM

## 2021-10-13 PROCEDURE — 99214 OFFICE O/P EST MOD 30 MIN: CPT | Performed by: FAMILY MEDICINE

## 2021-10-13 RX ORDER — METAXALONE 800 MG/1
TABLET ORAL
COMMUNITY
Start: 2021-10-06 | End: 2021-11-19

## 2021-11-04 ENCOUNTER — TELEPHONE (OUTPATIENT)
Dept: UROLOGY | Facility: MEDICAL CENTER | Age: 31
End: 2021-11-04

## 2021-11-12 ENCOUNTER — OFFICE VISIT (OUTPATIENT)
Dept: UROLOGY | Facility: CLINIC | Age: 31
End: 2021-11-12
Payer: MEDICARE

## 2021-11-12 VITALS
WEIGHT: 160 LBS | HEART RATE: 63 BPM | SYSTOLIC BLOOD PRESSURE: 124 MMHG | DIASTOLIC BLOOD PRESSURE: 86 MMHG | RESPIRATION RATE: 16 BRPM | BODY MASS INDEX: 21.2 KG/M2 | TEMPERATURE: 98 F | HEIGHT: 73 IN

## 2021-11-12 DIAGNOSIS — N48.89 PENILE LUMP: Primary | ICD-10-CM

## 2021-11-12 PROCEDURE — 99203 OFFICE O/P NEW LOW 30 MIN: CPT | Performed by: PHYSICIAN ASSISTANT

## 2021-11-17 DIAGNOSIS — F41.9 ANXIETY: ICD-10-CM

## 2021-11-17 RX ORDER — ALPRAZOLAM 2 MG/1
TABLET ORAL
Qty: 150 TABLET | Refills: 3 | Status: SHIPPED | OUTPATIENT
Start: 2021-11-17 | End: 2021-12-06 | Stop reason: SDUPTHER

## 2021-11-19 ENCOUNTER — OFFICE VISIT (OUTPATIENT)
Dept: FAMILY MEDICINE CLINIC | Facility: CLINIC | Age: 31
End: 2021-11-19
Payer: MEDICARE

## 2021-11-19 VITALS
HEART RATE: 84 BPM | OXYGEN SATURATION: 99 % | WEIGHT: 163.2 LBS | BODY MASS INDEX: 21.63 KG/M2 | HEIGHT: 73 IN | TEMPERATURE: 99.2 F | DIASTOLIC BLOOD PRESSURE: 60 MMHG | SYSTOLIC BLOOD PRESSURE: 114 MMHG | RESPIRATION RATE: 18 BRPM

## 2021-11-19 DIAGNOSIS — B88.9 INFESTATION: Primary | ICD-10-CM

## 2021-11-19 PROCEDURE — 99213 OFFICE O/P EST LOW 20 MIN: CPT | Performed by: FAMILY MEDICINE

## 2021-11-19 RX ORDER — PERMETHRIN 50 MG/G
CREAM TOPICAL ONCE
Qty: 60 G | Refills: 0 | Status: SHIPPED | OUTPATIENT
Start: 2021-11-19 | End: 2021-11-19

## 2021-12-06 ENCOUNTER — TELEPHONE (OUTPATIENT)
Dept: FAMILY MEDICINE CLINIC | Facility: CLINIC | Age: 31
End: 2021-12-06

## 2021-12-06 DIAGNOSIS — F41.9 ANXIETY: ICD-10-CM

## 2021-12-06 RX ORDER — ALPRAZOLAM 2 MG/1
TABLET ORAL
Qty: 150 TABLET | Refills: 3 | Status: SHIPPED | OUTPATIENT
Start: 2021-12-06 | End: 2022-07-25 | Stop reason: SDUPTHER

## 2021-12-14 DIAGNOSIS — F41.9 ANXIETY: ICD-10-CM

## 2022-01-21 ENCOUNTER — TELEPHONE (OUTPATIENT)
Dept: FAMILY MEDICINE CLINIC | Facility: CLINIC | Age: 32
End: 2022-01-21

## 2022-01-21 NOTE — TELEPHONE ENCOUNTER
Patient called stating he has faxed a release for records today to be sent to  Dr Damon Chen at   [facility]     Patient is asking for us to call him to let him know fax was received

## 2022-01-25 NOTE — TELEPHONE ENCOUNTER
Release for records received  [facility] and request was scanned into chart and attached to this note  Pt notified

## 2022-01-28 ENCOUNTER — TELEPHONE (OUTPATIENT)
Dept: FAMILY MEDICINE CLINIC | Facility: CLINIC | Age: 32
End: 2022-01-28

## 2022-01-28 NOTE — TELEPHONE ENCOUNTER
Pt signed [address] to disclose Complete Health record to Dr BRETT Dowd  Faxed to [facility]  on 01/26/2022 by me   Scanned and request attached to this t/c note

## 2022-02-14 ENCOUNTER — TELEPHONE (OUTPATIENT)
Dept: FAMILY MEDICINE CLINIC | Facility: CLINIC | Age: 32
End: 2022-02-14

## 2022-02-14 NOTE — TELEPHONE ENCOUNTER
T/c from pt 's mom, Archana - she is listed on his medical communication form - she would like to ask questions regarding his prescritptions  I tried to get more info but this was all she would share with me  Jesus Castelan [phone]

## 2022-02-26 DIAGNOSIS — F41.9 ANXIETY: ICD-10-CM

## 2022-02-26 RX ORDER — HYDROXYZINE PAMOATE 25 MG/1
25 CAPSULE ORAL EVERY 6 HOURS PRN
Qty: 100 CAPSULE | Refills: 2 | Status: SHIPPED | OUTPATIENT
Start: 2022-02-26

## 2022-03-31 ENCOUNTER — TELEPHONE (OUTPATIENT)
Dept: FAMILY MEDICINE CLINIC | Facility: CLINIC | Age: 32
End: 2022-03-31

## 2022-03-31 NOTE — TELEPHONE ENCOUNTER
Called pt to offer him an appt since he is due for an AWV, pt stated that he moved to [place], address in chart updated  Please remove PCP from pt's chart  Thank you       Anita Patel/tri  03/31/22  12:37 PM

## 2022-06-14 NOTE — TELEPHONE ENCOUNTER
06/14/22 3:23 PM        Thank you for your request  Your request has been received, reviewed, and the patient chart updated  The PCP has successfully been removed with a patient attribution note  This message will now be completed          Thank you  Gwenn Kayser

## 2022-07-19 ENCOUNTER — TELEPHONE (OUTPATIENT)
Dept: FAMILY MEDICINE CLINIC | Facility: CLINIC | Age: 32
End: 2022-07-19

## 2022-07-19 NOTE — TELEPHONE ENCOUNTER
T/c from pts mom -- last saw Dr Daniella Malloy in Nov 2022, then pt reported to office that he was moving to [place], so Dr Daniella Malloy was taken off as pcp  Pt has now moved back to [place] and wants to continue seeing Dr Daniella Malloy  However, he also needs a refill of his xanax, so he needs an appt soon  Pts mom is requesting Dr Daniella Malloy find a place for pt on the schedule sooner than next ovl is available, and a refill of pts xanax to get him through to the appt  Pts mom is aware Dr Daniella Malloy is out of the office until 7/26 and they will not have a response until that time  Also made mom aware the office would contact pt directly at that time and verified the p# in the system is still the correct p#

## 2022-07-25 DIAGNOSIS — F41.9 ANXIETY: ICD-10-CM

## 2022-07-25 RX ORDER — ALPRAZOLAM 2 MG/1
TABLET ORAL
Qty: 150 TABLET | Refills: 0 | Status: SHIPPED | OUTPATIENT
Start: 2022-07-25 | End: 2022-08-23 | Stop reason: SDUPTHER

## 2022-07-25 NOTE — TELEPHONE ENCOUNTER
Medication filled for 1 month  Please find 15 minute appt   In the next month and do AWV at that time

## 2022-07-26 ENCOUNTER — OFFICE VISIT (OUTPATIENT)
Dept: FAMILY MEDICINE CLINIC | Facility: CLINIC | Age: 32
End: 2022-07-26
Payer: MEDICARE

## 2022-07-26 VITALS
TEMPERATURE: 99 F | DIASTOLIC BLOOD PRESSURE: 78 MMHG | SYSTOLIC BLOOD PRESSURE: 118 MMHG | BODY MASS INDEX: 23.54 KG/M2 | HEART RATE: 88 BPM | WEIGHT: 177.6 LBS | OXYGEN SATURATION: 98 % | HEIGHT: 73 IN

## 2022-07-26 DIAGNOSIS — G71.19 PARAMYOTONIA CONGENITA: ICD-10-CM

## 2022-07-26 DIAGNOSIS — Z00.00 MEDICARE ANNUAL WELLNESS VISIT, SUBSEQUENT: ICD-10-CM

## 2022-07-26 DIAGNOSIS — G90.521 COMPLEX REGIONAL PAIN SYNDROME TYPE 1 OF RIGHT LOWER EXTREMITY: Primary | ICD-10-CM

## 2022-07-26 DIAGNOSIS — M51.17 INTERVERTEBRAL DISC DISORDER WITH RADICULOPATHY OF LUMBOSACRAL REGION: ICD-10-CM

## 2022-07-26 DIAGNOSIS — F41.9 ANXIETY: ICD-10-CM

## 2022-07-26 DIAGNOSIS — G89.4 CHRONIC PAIN SYNDROME: ICD-10-CM

## 2022-07-26 PROCEDURE — 99214 OFFICE O/P EST MOD 30 MIN: CPT | Performed by: FAMILY MEDICINE

## 2022-07-26 PROCEDURE — G0438 PPPS, INITIAL VISIT: HCPCS | Performed by: FAMILY MEDICINE

## 2022-07-26 NOTE — PROGRESS NOTES
Assessment and Plan:   Return visit in 3 months  Having him sign a controlled substance agreement and doing urine drug screen today  Problem List Items Addressed This Visit        Nervous and Auditory    Complex regional pain syndrome type 1 of right lower extremity - Primary    Relevant Orders    [facility] All Prescribed Meds    Amphetamines, Methamphetamines    Butalbital    Phenobarbital    Secobarbital    Temazepam    Alprazolam    Clonazepam    Diazepam    Lorazepam    Oxazepam    Gabapentin    Pregabalin    Cocaine    Heroin    Buprenorphine    Levorphanol    Meperidine    Naltrexone    Fentanyl    Methadone    Oxycodone    Oxymorphone    Tapentadol    THC    Tramadol    Codeine, Hydrocodone, Hydropmorphone, Morphine    Bath Salts    Ethyl Glucuronide/Ethyl Sulfate    Kratom    Spice    Methylphenidate    Phentermine    Validity Oxidant    Validity Creatinine    Validity pH    Validity Specific    Intervertebral disc disorder with radiculopathy of lumbosacral region       Musculoskeletal and Integument    Paramyotonia congenita       Other    Anxiety    Chronic pain syndrome      Other Visit Diagnoses     Medicare annual wellness visit, subsequent               Preventive health issues were discussed with patient, and age appropriate screening tests were ordered as noted in patient's After Visit Summary  Personalized health advice and appropriate referrals for health education or preventive services given if needed, as noted in patient's After Visit Summary  History of Present Illness:     Patient presents for a Medicare Wellness Visit    Patient returns our practice for several months  He moved to [facility] in attempt to be in a warmer climate to help with his chronic pain  Cool nights exacerbated his pain and he had difficulty finding medical services so he returned to [place]    While in [place] he attempted to decrease his Xanax dose which we had resulted in withdrawal symptoms and worsening of his chronic pain  Also suffers from anxiety for which he uses Vistaril 25 mg as needed and questions whether this seems to help him  Patient Care Team:  Kyree Trevizo MD as PCP - [address] [place] [address] (RTE)  Jarvis Gibbs MD (Pain Medicine)     Review of Systems:     Review of Systems   Constitutional: Negative  Respiratory: Negative  Cardiovascular: Negative  Musculoskeletal: Positive for arthralgias          Problem List:     Patient Active Problem List   Diagnosis    Anxiety    Insomnia    Complex regional pain syndrome type 1 of right lower extremity    Prolonged QT interval    Raynaud phenomenon    Chronic pain syndrome    Intervertebral disc disorder with radiculopathy of lumbosacral region    Paramyotonia congenita      Past Medical and Surgical History:     Past Medical History:   Diagnosis Date    Abnormal NCS (nerve conduction studies)     Anxiety     Chronic pain disorder     Intervertebral disc disorder with radiculopathy of lumbosacral region 4/24/2019     Past Surgical History:   Procedure Laterality Date    ARTHROSCOPIC REPAIR ACL Right     KNEE ARTHROSCOPY Right     ACL    OTHER SURGICAL HISTORY      trial stimulator    ND PERCUT IMPLNT NEUROELECT,EPIDURAL Right 7/12/2019    Procedure: PLACEMENT DORSAL ROOT GANGLION (DRG) ELECTRODE RIGHT L5, S1 W IMPLANTABLE PULSE GENERATOR, RIGHT;  Surgeon: John Greene MD;  Location:  MAIN OR;  Service: Neurosurgery    ROTATOR CUFF REPAIR Right       Family History:     Family History   Problem Relation Age of Onset    No Known Problems Mother     No Known Problems Father       Social History:     Social History     Socioeconomic History    Marital status: Single     Spouse name: None    Number of children: None    Years of education: None    Highest education level: None   Occupational History    Occupation: Full-time employment   Tobacco Use    Smoking status: Former Smoker    Smokeless tobacco: Former User  Tobacco comment: encourged smoking cessation   Vaping Use    Vaping Use: Never used   Substance and Sexual Activity    Alcohol use: Never    Drug use: No    Sexual activity: None   Other Topics Concern    None   Social History Narrative    Always uses a seat belt    Lives with family    Exercises regularly     Social Determinants of Health     Financial Resource Strain: Not on file   Food Insecurity: Not on file   Transportation Needs: Not on file   Physical Activity: Not on file   Stress: Not on file   Social Connections: Not on file   Intimate Partner Violence: Not on file   Housing Stability: Not on file      Medications and Allergies:     Current Outpatient Medications   Medication Sig Dispense Refill    ALPRAZolam (XANAX) 2 MG tablet 2 bid, 1 qhs 150 tablet 0    hydrOXYzine pamoate (VISTARIL) 25 mg capsule TAKE 1 CAPSULE (25 MG TOTAL) BY MOUTH EVERY 6 (SIX) HOURS AS NEEDED FOR ITCHING OR ANXIETY 100 capsule 2    mexiletine (MEXITIL) 150 mg capsule Take 150 mg by mouth 2 (two) times a day       No current facility-administered medications for this visit  No Known Allergies   Immunizations: There is no immunization history for the selected administration types on file for this patient  Health Maintenance:         Topic Date Due    Hepatitis C Screening  Never done    HIV Screening  Discontinued         Topic Date Due    COVID-19 Vaccine (1) Never done    Influenza Vaccine (1) 09/01/2022      Medicare Screening Tests and Risk Assessments:     Christiana Deras is here for his Subsequent Wellness visit  Last Medicare Wellness visit information reviewed, patient interviewed and updates made to the record today  Health Risk Assessment:   Patient rates overall health as good  Patient feels that their physical health rating is slightly better  Patient is satisfied with their life  Eyesight was rated as same  Hearing was rated as same   Patient feels that their emotional and mental health rating is same  Patients states they are never, rarely angry  Patient states they are never, rarely unusually tired/fatigued  Pain experienced in the last 7 days has been none  Patient states that he has experienced no weight loss or gain in last 6 months  Depression Screening:   PHQ-2 Score: 0      Fall Risk Screening: In the past year, patient has experienced: no history of falling in past year      Home Safety:  Patient has trouble with stairs inside or outside of their home  Patient has working smoke alarms and has working carbon monoxide detector  Home safety hazards include: none  Nutrition:   Current diet is Regular  High in protein, healthy foods    Medications:   Patient is currently taking over-the-counter supplements  OTC medications include: see medication list  Patient is able to manage medications  Activities of Daily Living (ADLs)/Instrumental Activities of Daily Living (IADLs):   Walk and transfer into and out of bed and chair?: Yes  Dress and groom yourself?: Yes    Bathe or shower yourself?: Yes    Feed yourself?  Yes  Do your laundry/housekeeping?: Yes  Manage your money, pay your bills and track your expenses?: Yes  Make your own meals?: Yes    Do your own shopping?: Yes    Previous Hospitalizations:   Any hospitalizations or ED visits within the last 12 months?: No      Advance Care Planning:   Living will: No    Durable POA for healthcare: No    Advanced directive: No    Advanced directive counseling given: Yes    Five wishes given: Yes    End of Life Decisions reviewed with patient: Yes    Provider agrees with end of life decisions: Yes      PREVENTIVE SCREENINGS      Cardiovascular Screening:    General: Risks and Benefits Discussed    Due for: Lipid Panel      Diabetes Screening:     General: Risks and Benefits Discussed    Due for: Blood Glucose      Colorectal Cancer Screening:     General: Screening Not Indicated      Prostate Cancer Screening:    General: Screening Not Indicated Abdominal Aortic Aneurysm (AAA) Screening:    Risk factors include: tobacco use        General: Screening Not Indicated      Lung Cancer Screening:     General: Screening Not Indicated      Hepatitis C Screening:    General: Risks and Benefits Discussed    Hep C Screening Accepted: Yes      Screening, Brief Intervention, and Referral to Treatment (SBIRT)    Screening  Typical number of drinks in a day: 0  Typical number of drinks in a week: 0  Interpretation: Low risk drinking behavior  Single Item Drug Screening:  How often have you used an illegal drug (including marijuana) or a prescription medication for non-medical reasons in the past year? never    Single Item Drug Screen Score: 0  Interpretation: Negative screen for possible drug use disorder    No exam data present     Physical Exam:     /78 (BP Location: Left arm, Patient Position: Sitting, Cuff Size: Standard)   Pulse 88   Temp 99 °F (37 2 °C) (Tympanic)   Ht 6' 1" (1 854 m)   Wt 80 6 kg (177 lb 9 6 oz)   SpO2 98%   BMI 23 43 kg/m²     Physical Exam  Vitals and nursing note reviewed  Constitutional:       Appearance: Normal appearance  He is well-developed  HENT:      Head: Normocephalic and atraumatic  Eyes:      Conjunctiva/sclera: Conjunctivae normal    Cardiovascular:      Rate and Rhythm: Normal rate and regular rhythm  Pulmonary:      Effort: Pulmonary effort is normal       Breath sounds: Normal breath sounds  Abdominal:      Palpations: Abdomen is soft  Musculoskeletal:      Cervical back: Neck supple  Skin:     General: Skin is warm and dry  Neurological:      Mental Status: He is alert     Psychiatric:         Mood and Affect: Mood normal          Behavior: Behavior normal           Kvng Wayne MD

## 2022-07-26 NOTE — PATIENT INSTRUCTIONS
Medicare Preventive Visit Patient Instructions  Thank you for completing your Welcome to Medicare Visit or Medicare Annual Wellness Visit today  Your next wellness visit will be due in one year (7/27/2023)  The screening/preventive services that you may require over the next 5-10 years are detailed below  Some tests may not apply to you based off risk factors and/or age  Screening tests ordered at today's visit but not completed yet may show as past due  Also, please note that scanned in results may not display below  Preventive Screenings:  Service Recommendations Previous Testing/Comments   Colorectal Cancer Screening  · Colonoscopy    · Fecal Occult Blood Test (FOBT)/Fecal Immunochemical Test (FIT)  · Fecal DNA/Cologuard Test  · Flexible Sigmoidoscopy Age: 54-65 years old   Colonoscopy: every 10 years (May be performed more frequently if at higher risk)  OR  FOBT/FIT: every 1 year  OR  Cologuard: every 3 years  OR  Sigmoidoscopy: every 5 years  Screening may be recommended earlier than age 48 if at higher risk for colorectal cancer  Also, an individualized decision between you and your healthcare provider will decide whether screening between the ages of 74-80 would be appropriate   Colonoscopy: Not on file  FOBT/FIT: Not on file  Cologuard: Not on file  Sigmoidoscopy: Not on file          Prostate Cancer Screening Individualized decision between patient and health care provider in men between ages of 53-78   Medicare will cover every 12 months beginning on the day after your 50th birthday PSA: No results in last 5 years     Screening Not Indicated     Hepatitis C Screening Once for adults born between [facility]  More frequently in patients at high risk for Hepatitis C Hep C Antibody: Not on file        Diabetes Screening 1-2 times per year if you're at risk for diabetes or have pre-diabetes Fasting glucose: 89 mg/dL   A1C: 4 8 %        Cholesterol Screening Once every 5 years if you don't have a lipid disorder  May order more often based on risk factors  Lipid panel: Not on file           Other Preventive Screenings Covered by Medicare:  1  Abdominal Aortic Aneurysm (AAA) Screening: covered once if your at risk  You're considered to be at risk if you have a family history of AAA or a male between the age of 73-68 who smoking at least 100 cigarettes in your lifetime  2  Lung Cancer Screening: covers low dose CT scan once per year if you meet all of the following conditions: (1) Age 50-69; (2) No signs or symptoms of lung cancer; (3) Current smoker or have quit smoking within the last 15 years; (4) You have a tobacco smoking history of at least 30 pack years (packs per day x number of years you smoked); (5) You get a written order from a healthcare provider  3  Glaucoma Screening: covered annually if you're considered high risk: (1) You have diabetes OR (2) Family history of glaucoma OR (3)  aged 48 and older OR (3)  American aged 72 and older  3  Osteoporosis Screening: covered every 2 years if you meet one of the following conditions: (1) Have a vertebral abnormality; (2) On glucocorticoid therapy for more than 3 months; (3) Have primary hyperparathyroidism; (4) On osteoporosis medications and need to assess response to drug therapy  5  HIV Screening: covered annually if you're between the age of 12-76  Also covered annually if you are younger than 13 and older than 72 with risk factors for HIV infection  For pregnant patients, it is covered up to 3 times per pregnancy      Immunizations:  Immunization Recommendations   Influenza Vaccine Annual influenza vaccination during flu season is recommended for all persons aged >= 6 months who do not have contraindications   Pneumococcal Vaccine (Prevnar and Pneumovax)  * Prevnar = PCV13  * Pneumovax = PPSV23 Adults 25-60 years old: 1-3 doses may be recommended based on certain risk factors  Adults 72 years old: Prevnar (PCV13) vaccine recommended followed by Pneumovax (PPSV23) vaccine  If already received PPSV23 since turning 65, then PCV13 recommended at least one year after PPSV23 dose  Hepatitis B Vaccine 3 dose series if at intermediate or high risk (ex: diabetes, end stage renal disease, liver disease)   Tetanus (Td) Vaccine - COST NOT COVERED BY MEDICARE PART B Following completion of primary series, a booster dose should be given every 10 years to maintain immunity against tetanus  Td may also be given as tetanus wound prophylaxis  Tdap Vaccine - COST NOT COVERED BY MEDICARE PART B Recommended at least once for all adults  For pregnant patients, recommended with each pregnancy  Shingles Vaccine (Shingrix) - COST NOT COVERED BY MEDICARE PART B  2 shot series recommended in those aged 48 and above     Health Maintenance Due:      Topic Date Due    Hepatitis C Screening  Never done    HIV Screening  Discontinued     Immunizations Due:      Topic Date Due    COVID-19 Vaccine (1) Never done    Influenza Vaccine (1) 09/01/2022     Advance Directives   What are advance directives? Advance directives are legal documents that state your wishes and plans for medical care  These plans are made ahead of time in case you lose your ability to make decisions for yourself  Advance directives can apply to any medical decision, such as the treatments you want, and if you want to donate organs  What are the types of advance directives? There are many types of advance directives, and each state has rules about how to use them  You may choose a combination of any of the following:  · Living will: This is a written record of the treatment you want  You can also choose which treatments you do not want, which to limit, and which to stop at a certain time  This includes surgery, medicine, IV fluid, and tube feedings  · Durable power of  for healthcare [facility]):   This is a written record that states who you want to make healthcare choices for you when you are unable to make them for yourself  This person, called a proxy, is usually a family member or a friend  You may choose more than 1 proxy  · Do not resuscitate (DNR) order:  A DNR order is used in case your heart stops beating or you stop breathing  It is a request not to have certain forms of treatment, such as CPR  A DNR order may be included in other types of advance directives  · Medical directive: This covers the care that you want if you are in a coma, near death, or unable to make decisions for yourself  You can list the treatments you want for each condition  Treatment may include pain medicine, surgery, blood transfusions, dialysis, IV or tube feedings, and a ventilator (breathing machine)  · Values history: This document has questions about your views, beliefs, and how you feel and think about life  This information can help others choose the care that you would choose  Why are advance directives important? An advance directive helps you control your care  Although spoken wishes may be used, it is better to have your wishes written down  Spoken wishes can be misunderstood, or not followed  Treatments may be given even if you do not want them  An advance directive may make it easier for your family to make difficult choices about your care  © Copyright [employer] 2018 Information is for End User's use only and may not be sold, redistributed or otherwise used for commercial purposes  All illustrations and images included in CareNotes® are the copyrighted property of [employer]  or [address] of care:  Maximize your health and quality of life by:   · Increasing your level of function and activity  · Decreasing the negative effects of pain on your life  · Minimizing the risks and side effects of medications and ensuring safe use of opioid medication     Ways for you to help meet your goals:  Maintain a healthy lifestyle   This includes proper nutrition, regular physical activity as able, try for 8 hours of sleep per night, use stress reduction strategies, avoid triggers  Risks and side effects of opioid use:  Prescription opioids carry serious risks of addiction and  overdose, especially with prolonged use  An opioid overdose,  often marked by slowed breathing, can cause sudden death  The  use of prescription opioids can have a number of side effects as  well, even when taken as directed:  · Tolerance--meaning you might need to take more of a medication for the same pain relief  · Physical dependence--meaning you have symptoms of withdrawal when a medication is stopped  · Increased sensitivity to pain  · Constipation  · Nausea, vomiting, dry mouth  · Sleepiness and dizziness   · Confusion  · Depression  · Low levels of testosterone that can result in lower sex drive, energy, and strength  · Itching and sweating    If you are prescribed opioids for pain:  · Never take opioids in greater amounts or more often than prescribed  · Help prevent misuse and abuse  - Never sell or share prescription opioids         - Never use another persons prescription opioids  · Store prescription opioids in a secure place and out of reach of others (this may include visitors, children, friends, and family)  · Safely dispose of unused prescription opioids: Find your community drug take-back program or your pharmacy mail-back program, or flush them down the toilet, following guidance from the Food and Drug Administration (www fda gov/Drugs/ResourcesForYou)  · Visit www cdc gov/drugoverdose to learn about the risks of opioid abuse and overdose  · If you believe you may be struggling with addiction, tell your health care provider and ask for guidance or call [address] at 4-711-534-ILDR

## 2022-08-01 LAB
6MAM UR QL CFM: NEGATIVE NG/ML
7AMINOCLONAZEPAM UR QL CFM: NEGATIVE NG/ML
A-OH ALPRAZ UR QL CFM: NORMAL NG/ML
ACCEPTABLE CREAT UR QL: NORMAL MG/DL
ACCEPTIBLE SP GR UR QL: NORMAL
AMPHET UR QL CFM: NEGATIVE NG/ML
BUPRENORPHINE UR QL CFM: NEGATIVE NG/ML
BUTALBITAL UR QL CFM: NEGATIVE NG/ML
BZE UR QL CFM: NEGATIVE NG/ML
CODEINE UR QL CFM: NEGATIVE NG/ML
EDDP UR QL CFM: NEGATIVE NG/ML
ETHYL GLUCURONIDE UR QL CFM: NEGATIVE NG/ML
ETHYL SULFATE UR QL SCN: NEGATIVE NG/ML
EUTYLONE UR QL: NEGATIVE NG/ML
FENTANYL UR QL CFM: NEGATIVE NG/ML
GLIADIN IGG SER IA-ACNC: NEGATIVE NG/ML
HYDROCODONE UR QL CFM: NEGATIVE NG/ML
HYDROMORPHONE UR QL CFM: NEGATIVE NG/ML
LORAZEPAM UR QL CFM: NEGATIVE NG/ML
ME-PHENIDATE UR QL CFM: NEGATIVE NG/ML
MEPERIDINE UR QL CFM: NEGATIVE NG/ML
METHADONE UR QL CFM: NEGATIVE NG/ML
METHAMPHET UR QL CFM: NEGATIVE NG/ML
MORPHINE UR QL CFM: NEGATIVE NG/ML
NALTREXONE UR QL CFM: NEGATIVE NG/ML
NITRITE UR QL: NORMAL UG/ML
NORBUPRENORPHINE UR QL CFM: NEGATIVE NG/ML
NORDIAZEPAM UR QL CFM: NEGATIVE NG/ML
NORFENTANYL UR QL CFM: NEGATIVE NG/ML
NORHYDROCODONE UR QL CFM: NEGATIVE NG/ML
NORMEPERIDINE UR QL CFM: NEGATIVE NG/ML
NOROXYCODONE UR QL CFM: NEGATIVE NG/ML
OXAZEPAM UR QL CFM: NEGATIVE NG/ML
OXYCODONE UR QL CFM: NEGATIVE NG/ML
OXYMORPHONE UR QL CFM: NEGATIVE NG/ML
OXYMORPHONE UR QL CFM: NEGATIVE NG/ML
PHENOBARB UR QL CFM: NEGATIVE NG/ML
RESULT ALL_PRESCRIBED MEDS AND SPECIAL INSTRUCTIONS: NORMAL
SECOBARBITAL UR QL CFM: NEGATIVE NG/ML
SL AMB 3-METHYL-FENTANYL QUANTIFICATION: NORMAL NG/ML
SL AMB 4-ANPP QUANTIFICATION: NORMAL NG/ML
SL AMB 4-FIBF QUANTIFICATION: NORMAL NG/ML
SL AMB 5F-ADB-M7 METABOLITE QUANTIFICATION: NEGATIVE NG/ML
SL AMB 7-OH-MITRAGYNINE (KRATOM ALKALOID) QUANTIFICATION: NEGATIVE NG/ML
SL AMB AB-FUBINACA-M3 METABOLITE QUANTIFICATION: NEGATIVE NG/ML
SL AMB ACETYL FENTANYL QUANTIFICATION: NORMAL NG/ML
SL AMB ACETYL NORFENTANYL QUANTIFICATION: NORMAL NG/ML
SL AMB ACRYL FENTANYL QUANTIFICATION: NORMAL NG/ML
SL AMB BUTRYL FENTANYL QUANTIFICATION: NORMAL NG/ML
SL AMB CARFENTANIL QUANTIFICATION: NORMAL NG/ML
SL AMB CTHC (MARIJUANA METABOLITE) QUANTIFICATION: ABNORMAL NG/ML
SL AMB CYCLOPROPYL FENTANYL QUANTIFICATION: NORMAL NG/ML
SL AMB DEXTRORPHAN (DEXTROMETHORPHAN METABOLITE) QUANT: NEGATIVE NG/ML
SL AMB FURANYL FENTANYL QUANTIFICATION: NORMAL NG/ML
SL AMB GABAPENTIN QUANTIFICATION: NEGATIVE
SL AMB JWH018 METABOLITE QUANTIFICATION: NEGATIVE NG/ML
SL AMB JWH073 METABOLITE QUANTIFICATION: NEGATIVE NG/ML
SL AMB MDMB-FUBINACA-M1 METABOLITE QUANTIFICATION: NEGATIVE NG/ML
SL AMB METHOXYACETYL FENTANYL QUANTIFICATION: NORMAL NG/ML
SL AMB METHYLONE QUANTIFICATION: NEGATIVE NG/ML
SL AMB N-DESMETHYL U-47700 QUANTIFICATION: NORMAL NG/ML
SL AMB N-DESMETHYL-TRAMADOL QUANTIFICATION: NEGATIVE NG/ML
SL AMB PHENTERMINE QUANTIFICATION: NEGATIVE NG/ML
SL AMB PREGABALIN QUANTIFICATION: NEGATIVE
SL AMB RCS4 METABOLITE QUANTIFICATION: NEGATIVE NG/ML
SL AMB RITALINIC ACID QUANTIFICATION: NEGATIVE NG/ML
SL AMB U-47700 QUANTIFICATION: NORMAL NG/ML
SMOOTH MUSCLE AB TITR SER IF: NEGATIVE NG/ML
SPECIMEN DRAWN SERPL: NEGATIVE NG/ML
SPECIMEN PH ACCEPTABLE UR: NORMAL
TAPENTADOL UR QL CFM: NEGATIVE NG/ML
TEMAZEPAM UR QL CFM: NEGATIVE NG/ML
TEMAZEPAM UR QL CFM: NEGATIVE NG/ML
TRAMADOL UR QL CFM: NEGATIVE NG/ML
URATE/CREAT 24H UR: NEGATIVE NG/ML

## 2022-08-23 DIAGNOSIS — F41.9 ANXIETY: ICD-10-CM

## 2022-08-23 RX ORDER — ALPRAZOLAM 2 MG/1
TABLET ORAL
Qty: 150 TABLET | Refills: 0 | Status: SHIPPED | OUTPATIENT
Start: 2022-08-23 | End: 2022-09-21 | Stop reason: SDUPTHER

## 2022-09-21 DIAGNOSIS — F41.9 ANXIETY: ICD-10-CM

## 2022-09-21 RX ORDER — ALPRAZOLAM 2 MG/1
TABLET ORAL
Qty: 150 TABLET | Refills: 0 | Status: SHIPPED | OUTPATIENT
Start: 2022-09-21 | End: 2022-09-21

## 2022-09-21 RX ORDER — ALPRAZOLAM 2 MG/1
TABLET ORAL
Qty: 150 TABLET | Refills: 0 | Status: SHIPPED | OUTPATIENT
Start: 2022-09-21 | End: 2022-10-20 | Stop reason: SDUPTHER

## 2022-09-21 NOTE — TELEPHONE ENCOUNTER
PDMP REVIEWED : YES  PAIN MANAGEMENT ON FILE : YES                          [id number] 08/23/2022 08/23/2022 ALPRAZolam (Tablet) 150 0 30 2 MG NA [facility]  Medicare 0 / 0 [place]     2 [id number] 07/25/2022 07/25/2022 ALPRAZolam (Tablet) 150 0 90 2 MG NA [facility]  Medicare 0 / 0 [place]    2 [id number] 12/17/2021 09/09/2021 Ketamine Hcl (Powder) 1 5 30  NA [facility] Private Pay 3 / 5 [place]    3 [id number] 11/17/2021 11/17/2021 ALPRAZolam (Tablet) 150 0 30 2 MG NA [facility]  Medicare 00 / 3 [place]    4 [id number] 11/02/2021 09/09/2021 Ketamine Hcl (Powder) 1 5 30  NA [facility] [address] Private Pay          Please approve or refuse refill

## 2022-10-20 DIAGNOSIS — F41.9 ANXIETY: ICD-10-CM

## 2022-10-20 RX ORDER — ALPRAZOLAM 2 MG/1
TABLET ORAL
Qty: 150 TABLET | Refills: 0 | Status: SHIPPED | OUTPATIENT
Start: 2022-10-20

## 2022-10-20 NOTE — TELEPHONE ENCOUNTER
1 [id number] 09/21/2022 09/21/2022 ALPRAZolam (Tablet) 150 0 30 2 MG NA [facility]  Medicare 0 / 0 [place]    1 [id number] 08/23/2022 08/23/2022 ALPRAZolam (Tablet) 150 0 30 2 MG NA [facility]  Medicare 0 / 0 [place]    2 [id number] 07/25/2022 07/25/2022 ALPRAZolam (Tablet) 150 0 90 2 MG NA [facility]  Medicare 0 / 0 [place]    2 [id number] 12/17/2021 09/09/2021 Ketamine Hcl (Powder) 1 5 30  NA [facility] Private Pay 3 / 5 [place]    3 [id number] 11/17/2021 11/17/2021 ALPRAZolam (Tablet) 150 0 30 2 MG NA [facility]  Medicare 00 / 3 [place]        pts mom called in request -- reports pt needs med today   Please call when sent in

## 2022-11-02 ENCOUNTER — OFFICE VISIT (OUTPATIENT)
Dept: FAMILY MEDICINE CLINIC | Facility: CLINIC | Age: 32
End: 2022-11-02

## 2022-11-02 VITALS
HEART RATE: 69 BPM | BODY MASS INDEX: 23.99 KG/M2 | WEIGHT: 181 LBS | OXYGEN SATURATION: 98 % | SYSTOLIC BLOOD PRESSURE: 130 MMHG | HEIGHT: 73 IN | TEMPERATURE: 98.6 F | DIASTOLIC BLOOD PRESSURE: 70 MMHG

## 2022-11-02 DIAGNOSIS — G47.00 INSOMNIA, UNSPECIFIED TYPE: ICD-10-CM

## 2022-11-02 DIAGNOSIS — F13.20 BENZODIAZEPINE DEPENDENCE (HCC): ICD-10-CM

## 2022-11-02 DIAGNOSIS — G71.19 PARAMYOTONIA CONGENITA: ICD-10-CM

## 2022-11-02 DIAGNOSIS — G90.521 COMPLEX REGIONAL PAIN SYNDROME TYPE 1 OF RIGHT LOWER EXTREMITY: ICD-10-CM

## 2022-11-02 DIAGNOSIS — F41.9 ANXIETY: Primary | ICD-10-CM

## 2022-11-02 DIAGNOSIS — M51.17 INTERVERTEBRAL DISC DISORDER WITH RADICULOPATHY OF LUMBOSACRAL REGION: ICD-10-CM

## 2022-11-02 RX ORDER — ALPRAZOLAM 2 MG/1
TABLET ORAL
Qty: 150 TABLET | Refills: 3 | Status: SHIPPED | OUTPATIENT
Start: 2022-11-02

## 2022-11-02 NOTE — PROGRESS NOTES
Name: Bart Bellamy      : 1990      MRN: [id number]  Encounter Provider: Laine Gray MD  Encounter Date: 2022   [facility] [address] [place] [address]  Anxiety  Assessment & Plan:  Continue Xanax 4 mg b i d  Orders:  -     [facility] All Prescribed Meds and Special Instructions  -     Amphetamines, Methamphetamines  -     Butalbital  -     Phenobarbital  -     Secobarbital  -     Temazepam  -     Alprazolam  -     Clonazepam  -     Diazepam  -     Lorazepam  -     Oxazepam  -     Gabapentin  -     Pregabalin  -     Cocaine  -     Heroin  -     Buprenorphine  -     Levorphanol  -     Meperidine  -     Naltrexone  -     Fentanyl  -     Methadone  -     Oxycodone  -     Oxymorphone  -     Tapentadol  -     THC  -     Tramadol  -     Codeine, Hydrocodone, Hydropmorphone, Morphine  -     Bath Salts  -     Ethyl Glucuronide/Ethyl Sulfate  -     Kratom  -     Spice  -     Methylphenidate  -     Phentermine  -     Validity Oxidant  -     Validity Creatinine  -     Validity pH  -     Validity Specific  -     ALPRAZolam (XANAX) 2 MG tablet; TAKE 2 TABLETS TWICE A DAY AND 1 TABLET AT BEDTIME    2  Complex regional pain syndrome type 1 of right lower extremity    3  Intervertebral disc disorder with radiculopathy of lumbosacral region    4  Paramyotonia congenita  Assessment & Plan:  Continue Mexitil 150 mg b i d   Follow-up with Neurology      5  [facility]    6  Insomnia, unspecified type  Assessment & Plan:  Continue Xanax 2 mg q h s  Return visit in 4 months  30 minutes was spent evaluating patient and planning his care  Depression Screening and Follow-up Plan: Patient was screened for depression during today's encounter  They screened negative with a PHQ-2 score of 0  Subjective      Patient comes in with his mother for checkup    He did have a great deal pain is right leg make it difficult for him to get around especially in colder weather  He requests a handicap driving placard which we will do for him  He has plans to see Neurology in the near future  His medications have remained the same  Review of Systems   Constitutional: Negative  Respiratory: Negative  Cardiovascular: Negative  Musculoskeletal: Positive for arthralgias and gait problem  Psychiatric/Behavioral: The patient is nervous/anxious  Current Outpatient Medications on File Prior to Visit   Medication Sig   • mexiletine (MEXITIL) 150 mg capsule Take 150 mg by mouth 1 (one) time   • [DISCONTINUED] ALPRAZolam (XANAX) 2 MG tablet TAKE 2 TABLETS TWICE A DAY AND 1 TABLET AT BEDTIME   • hydrOXYzine pamoate (VISTARIL) 25 mg capsule TAKE 1 CAPSULE (25 MG TOTAL) BY MOUTH EVERY 6 (SIX) HOURS AS NEEDED FOR ITCHING OR ANXIETY (Patient not taking: Reported on 11/2/2022)       Objective     /70 (BP Location: Left arm, Patient Position: Sitting, Cuff Size: Adult)   Pulse 69   Temp 98 6 °F (37 °C) (Tympanic)   Ht 6' 1" (1 854 m)   Wt 82 1 kg (181 lb)   SpO2 98%   BMI 23 88 kg/m²     Physical Exam  Constitutional:       Appearance: Normal appearance  He is well-developed  HENT:      Head: Normocephalic and atraumatic  Eyes:      Pupils: Pupils are equal, round, and reactive to light  Cardiovascular:      Rate and Rhythm: Normal rate and regular rhythm  Heart sounds: Normal heart sounds  Pulmonary:      Effort: Pulmonary effort is normal       Breath sounds: Normal breath sounds  Abdominal:      General: Bowel sounds are normal       Palpations: Abdomen is soft  Musculoskeletal:      Cervical back: Neck supple  Comments: Exquisite pain to light touch right lower extremity   Skin:     General: Skin is warm and dry  Neurological:      Mental Status: He is alert  Psychiatric:         Mood and Affect: Mood normal          Behavior: Behavior normal          Thought Content:  Thought content normal  Liv Sen MD

## 2022-11-05 LAB
6MAM UR QL CFM: NEGATIVE NG/ML
7AMINOCLONAZEPAM UR QL CFM: NEGATIVE NG/ML
A-OH ALPRAZ UR QL CFM: NORMAL NG/ML
ACCEPTABLE CREAT UR QL: NORMAL MG/DL
ACCEPTIBLE SP GR UR QL: NORMAL
AMPHET UR QL CFM: NEGATIVE NG/ML
BUPRENORPHINE UR QL CFM: NEGATIVE NG/ML
BUTALBITAL UR QL CFM: NEGATIVE NG/ML
BZE UR QL CFM: NEGATIVE NG/ML
CODEINE UR QL CFM: NEGATIVE NG/ML
EDDP UR QL CFM: NEGATIVE NG/ML
ETHYL GLUCURONIDE UR QL CFM: NEGATIVE NG/ML
ETHYL SULFATE UR QL SCN: NEGATIVE NG/ML
EUTYLONE UR QL: NEGATIVE NG/ML
FENTANYL UR QL CFM: NEGATIVE NG/ML
GLIADIN IGG SER IA-ACNC: NEGATIVE NG/ML
HYDROCODONE UR QL CFM: NEGATIVE NG/ML
HYDROMORPHONE UR QL CFM: NEGATIVE NG/ML
LORAZEPAM UR QL CFM: NEGATIVE NG/ML
ME-PHENIDATE UR QL CFM: NEGATIVE NG/ML
MEPERIDINE UR QL CFM: NEGATIVE NG/ML
METHADONE UR QL CFM: NEGATIVE NG/ML
METHAMPHET UR QL CFM: NEGATIVE NG/ML
MORPHINE UR QL CFM: NEGATIVE NG/ML
NALTREXONE UR QL CFM: NEGATIVE NG/ML
NITRITE UR QL: NORMAL UG/ML
NORBUPRENORPHINE UR QL CFM: NEGATIVE NG/ML
NORDIAZEPAM UR QL CFM: NEGATIVE NG/ML
NORFENTANYL UR QL CFM: NEGATIVE NG/ML
NORHYDROCODONE UR QL CFM: NEGATIVE NG/ML
NORMEPERIDINE UR QL CFM: NEGATIVE NG/ML
NOROXYCODONE UR QL CFM: NEGATIVE NG/ML
OXAZEPAM UR QL CFM: NEGATIVE NG/ML
OXYCODONE UR QL CFM: NEGATIVE NG/ML
OXYMORPHONE UR QL CFM: NEGATIVE NG/ML
OXYMORPHONE UR QL CFM: NEGATIVE NG/ML
PARA-FLUOROFENTANYL QUANTIFICATION: NORMAL NG/ML
PHENOBARB UR QL CFM: NEGATIVE NG/ML
RESULT ALL_PRESCRIBED MEDS AND SPECIAL INSTRUCTIONS: NORMAL
SECOBARBITAL UR QL CFM: NEGATIVE NG/ML
SL AMB 4-ANPP QUANTIFICATION: NORMAL NG/ML
SL AMB 5F-ADB-M7 METABOLITE QUANTIFICATION: NEGATIVE NG/ML
SL AMB 7-OH-MITRAGYNINE (KRATOM ALKALOID) QUANTIFICATION: NEGATIVE NG/ML
SL AMB AB-FUBINACA-M3 METABOLITE QUANTIFICATION: NEGATIVE NG/ML
SL AMB ACETYL FENTANYL QUANTIFICATION: NORMAL NG/ML
SL AMB ACETYL NORFENTANYL QUANTIFICATION: NORMAL NG/ML
SL AMB ACRYL FENTANYL QUANTIFICATION: NORMAL NG/ML
SL AMB CARFENTANIL QUANTIFICATION: NORMAL NG/ML
SL AMB CTHC (MARIJUANA METABOLITE) QUANTIFICATION: ABNORMAL NG/ML
SL AMB DEXTRORPHAN (DEXTROMETHORPHAN METABOLITE) QUANT: NEGATIVE NG/ML
SL AMB GABAPENTIN QUANTIFICATION: NEGATIVE
SL AMB JWH018 METABOLITE QUANTIFICATION: NEGATIVE NG/ML
SL AMB JWH073 METABOLITE QUANTIFICATION: NEGATIVE NG/ML
SL AMB MDMB-FUBINACA-M1 METABOLITE QUANTIFICATION: NEGATIVE NG/ML
SL AMB METHYLONE QUANTIFICATION: NEGATIVE NG/ML
SL AMB N-DESMETHYL-TRAMADOL QUANTIFICATION: NEGATIVE NG/ML
SL AMB PHENTERMINE QUANTIFICATION: NEGATIVE NG/ML
SL AMB PREGABALIN QUANTIFICATION: NEGATIVE
SL AMB RCS4 METABOLITE QUANTIFICATION: NEGATIVE NG/ML
SL AMB RITALINIC ACID QUANTIFICATION: NEGATIVE NG/ML
SMOOTH MUSCLE AB TITR SER IF: NEGATIVE NG/ML
SPECIMEN DRAWN SERPL: NEGATIVE NG/ML
SPECIMEN PH ACCEPTABLE UR: NORMAL
TAPENTADOL UR QL CFM: NEGATIVE NG/ML
TEMAZEPAM UR QL CFM: NEGATIVE NG/ML
TEMAZEPAM UR QL CFM: NEGATIVE NG/ML
TRAMADOL UR QL CFM: NEGATIVE NG/ML
URATE/CREAT 24H UR: NEGATIVE NG/ML

## 2023-01-03 ENCOUNTER — TELEPHONE (OUTPATIENT)
Dept: PAIN MEDICINE | Facility: CLINIC | Age: 33
End: 2023-01-03

## 2023-01-03 NOTE — TELEPHONE ENCOUNTER
Caller:Archana Diallo    Doctor: Zeke Pagan    Reason for call: pts mother has stimulator and wanted to know if pt can still see Dr Alanis He, explained that pt can call and make appmt

## 2023-03-02 ENCOUNTER — RA CDI HCC (OUTPATIENT)
Dept: OTHER | Facility: HOSPITAL | Age: 33
End: 2023-03-02

## 2023-03-02 NOTE — PROGRESS NOTES
[address]  coding opportunities       Chart reviewed, no opportunity found: CHART REVIEWED, NO OPPORTUNITY FOUND        Patients Insurance     Medicare Insurance: Medicare

## 2023-03-09 ENCOUNTER — OFFICE VISIT (OUTPATIENT)
Dept: FAMILY MEDICINE CLINIC | Facility: CLINIC | Age: 33
End: 2023-03-09

## 2023-03-09 VITALS
BODY MASS INDEX: 23.33 KG/M2 | HEART RATE: 83 BPM | TEMPERATURE: 98.8 F | WEIGHT: 176 LBS | HEIGHT: 73 IN | SYSTOLIC BLOOD PRESSURE: 114 MMHG | DIASTOLIC BLOOD PRESSURE: 82 MMHG | OXYGEN SATURATION: 98 %

## 2023-03-09 DIAGNOSIS — F41.9 ANXIETY: ICD-10-CM

## 2023-03-09 DIAGNOSIS — G90.521 COMPLEX REGIONAL PAIN SYNDROME TYPE 1 OF RIGHT LOWER EXTREMITY: Primary | ICD-10-CM

## 2023-03-09 DIAGNOSIS — M51.17 INTERVERTEBRAL DISC DISORDER WITH RADICULOPATHY OF LUMBOSACRAL REGION: ICD-10-CM

## 2023-03-09 DIAGNOSIS — G71.19 PARAMYOTONIA CONGENITA: ICD-10-CM

## 2023-03-09 RX ORDER — ALPRAZOLAM 2 MG/1
TABLET ORAL
Qty: 150 TABLET | Refills: 3 | Status: SHIPPED | OUTPATIENT
Start: 2023-03-09

## 2023-03-09 NOTE — PROGRESS NOTES
Name: Juan Moser      : 1990      MRN: [id number]  Encounter Provider: Gerson Zarco MD  Encounter Date: 3/9/2023   Encounter department: [place] [address] [address] [place] [address]   Return visit 4 months  1  Complex regional pain syndrome type 1 of right lower extremity    2  Paramyotonia congenita    3  Anxiety  -     ALPRAZolam (XANAX) 2 MG tablet; TAKE 2 TABLETS TWICE A DAY AND 1 TABLET AT BEDTIME    4  Intervertebral disc disorder with radiculopathy of lumbosacral region  Assessment & Plan:  Continue with nerve stimulator  Follow-up with pain management        Depression Screening and Follow-up Plan: Patient was screened for depression during today's encounter  They screened negative with a PHQ-2 score of 1  Subjective      Patient comes in for checkup  He continues to follow-up with neurology and pain management neurostimulator for his right lower back  Review of Systems   Constitutional: Negative  Respiratory: Negative  Cardiovascular: Negative  Gastrointestinal: Negative  Musculoskeletal: Positive for arthralgias and back pain  Current Outpatient Medications on File Prior to Visit   Medication Sig   • [DISCONTINUED] ALPRAZolam (XANAX) 2 MG tablet TAKE 2 TABLETS TWICE A DAY AND 1 TABLET AT BEDTIME   • [DISCONTINUED] hydrOXYzine pamoate (VISTARIL) 25 mg capsule TAKE 1 CAPSULE (25 MG TOTAL) BY MOUTH EVERY 6 (SIX) HOURS AS NEEDED FOR ITCHING OR ANXIETY (Patient not taking: Reported on 2022)   • [DISCONTINUED] mexiletine (MEXITIL) 150 mg capsule Take 150 mg by mouth 1 (one) time       Objective     /82 (BP Location: Left arm, Patient Position: Sitting, Cuff Size: Adult)   Pulse 83   Temp 98 8 °F (37 1 °C) (Tympanic)   Ht 6' 1" (1 854 m)   Wt 79 8 kg (176 lb)   SpO2 98%   BMI 23 22 kg/m²     Physical Exam  Constitutional:       Appearance: Normal appearance  He is well-developed     HENT:      Head: Normocephalic and atraumatic  Eyes:      Pupils: Pupils are equal, round, and reactive to light  Cardiovascular:      Rate and Rhythm: Normal rate and regular rhythm  Heart sounds: Normal heart sounds  Pulmonary:      Effort: Pulmonary effort is normal       Breath sounds: Normal breath sounds  Musculoskeletal:         General: Normal range of motion  Cervical back: Neck supple  Skin:     General: Skin is warm and dry  Neurological:      Mental Status: He is alert  Psychiatric:         Mood and Affect: Mood normal          Behavior: Behavior normal          Thought Content:  Thought content normal        Gerson Zarco MD

## 2023-05-24 ENCOUNTER — HOSPITAL ENCOUNTER (OUTPATIENT)
Dept: RADIOLOGY | Facility: HOSPITAL | Age: 33
Discharge: HOME | End: 2023-05-24

## 2023-05-24 ENCOUNTER — CONSULT (OUTPATIENT)
Dept: PAIN MEDICINE | Facility: CLINIC | Age: 33
End: 2023-05-24

## 2023-05-24 VITALS
WEIGHT: 176 LBS | DIASTOLIC BLOOD PRESSURE: 74 MMHG | HEART RATE: 53 BPM | SYSTOLIC BLOOD PRESSURE: 118 MMHG | BODY MASS INDEX: 23.22 KG/M2

## 2023-05-24 DIAGNOSIS — G90.521 COMPLEX REGIONAL PAIN SYNDROME TYPE 1 OF RIGHT LOWER EXTREMITY: Primary | ICD-10-CM

## 2023-05-24 DIAGNOSIS — G90.521 COMPLEX REGIONAL PAIN SYNDROME TYPE 1 OF RIGHT LOWER EXTREMITY: ICD-10-CM

## 2023-05-24 NOTE — PROGRESS NOTES
Assessment  1  Complex regional pain syndrome type 1 of right lower extremity        Plan  The patient symptoms, history/physical are consistent with ongoing pain from his CRPS type 1 of the right lower extremity which is well controlled  At this time, advised him that he may start doing more weight based exercises to help strengthen muscle tone and build more stamina  I will also order x-rays of the lumbar spine to evaluate the DRG lead locations  My impressions and treatment recommendations were discussed in detail with the patient who verbalized understanding and had no further questions  Discharge instructions were provided  I personally saw and examined the patient and I agree with the above discussed plan of care  Orders Placed This Encounter   Procedures   • X-ray lumbar spine complete 4+ views     Evaluate DRG lead locations     Standing Status:   Future     Number of Occurrences:   1     Standing Expiration Date:   5/24/2027     Scheduling Instructions:      Bring along any outside films relating to this procedure  No orders of the defined types were placed in this encounter  History of Present Illness    Brad Krause is a 34 y o  male referred by Dr Kalee Swift for chronic right leg pain that has been present for 5 years  He has a history of CRPS type II and had DRG spinal cord stimulator placed in 2019  He reports ongoing moderate to severe pain rated 8-10/10 on a numeric rating scale that is felt constantly  Symptoms are dull, aching, throbbing, pressure-like, sharp, shooting, burning, cramping down the right leg  He feels weakness of the leg  Symptoms are aggravated standing, bending, sitting, coughing, sneezing, bowel movements  He is getting good relief with the DRG stimulation he reports being more active because of it but still has episodes of discomfort with the right foot  He understands his limitations more but is wondering if there is more that can be done    He has weaned himself off of all medications that he was previously on as they were causing mental cognition issues  I have personally reviewed and/or updated the patient's past medical history, past surgical history, family history, social history, current medications, allergies, and vital signs today  Review of Systems   Constitutional: Negative for fever and unexpected weight change  HENT: Negative for trouble swallowing  Eyes: Positive for pain and visual disturbance  Respiratory: Negative for shortness of breath and wheezing  Cardiovascular: Negative for chest pain and palpitations  Gastrointestinal: Negative for constipation, diarrhea, nausea and vomiting  Endocrine: Negative for cold intolerance, heat intolerance and polydipsia  Genitourinary: Negative for difficulty urinating and frequency  Musculoskeletal: Positive for back pain, gait problem and joint swelling  Negative for arthralgias and myalgias  Skin: Positive for rash  Neurological: Negative for dizziness, seizures, syncope, weakness and headaches  Hematological: Does not bruise/bleed easily  Psychiatric/Behavioral: Positive for decreased concentration  Negative for dysphoric mood  The patient is nervous/anxious  All other systems reviewed and are negative        Patient Active Problem List   Diagnosis   • Anxiety   • Insomnia   • Complex regional pain syndrome type 1 of right lower extremity   • Prolonged QT interval   • Raynaud phenomenon   • Chronic pain syndrome   • Intervertebral disc disorder with radiculopathy of lumbosacral region   • Paramyotonia congenita   • [facility]       Past Medical History:   Diagnosis Date   • Abnormal NCS (nerve conduction studies)    • Anxiety    • Chronic pain disorder    • Intervertebral disc disorder with radiculopathy of lumbosacral region 4/24/2019       Past Surgical History:   Procedure Laterality Date   • ARTHROSCOPIC REPAIR ACL Right    • KNEE ARTHROSCOPY Right     ACL   • OTHER SURGICAL HISTORY      trial stimulator   • [place] PRQ IMPLTJ NSTIM ELECTRODE ARRAY EPIDURAL Right 2019    Procedure: PLACEMENT DORSAL ROOT GANGLION (DRG) ELECTRODE RIGHT L5, S1 W IMPLANTABLE PULSE GENERATOR, RIGHT;  Surgeon: Nancie Eddy MD;  Location: [facility];  Service: Neurosurgery   • ROTATOR CUFF REPAIR Right        Family History   Problem Relation Age of Onset   • No Known Problems Mother    • No Known Problems Father        Social History     Occupational History   • Occupation: Full-time employment   Tobacco Use   • Smoking status: Former     Types: Cigarettes     Start date:      Quit date:      Years since quittin 4   • Smokeless tobacco: Former   • Tobacco comments:     encourged smoking cessation   Vaping Use   • Vaping Use: Never used   Substance and Sexual Activity   • Alcohol use: Never   • Drug use: No   • Sexual activity: Not on file       Current Outpatient Medications on File Prior to Visit   Medication Sig   • ALPRAZolam (XANAX) 2 MG tablet TAKE 2 TABLETS TWICE A DAY AND 1 TABLET AT BEDTIME     No current facility-administered medications on file prior to visit  No Known Allergies    Physical Exam    /74   Pulse (!) 53   Wt 79 8 kg (176 lb)   BMI 23 22 kg/m²     Constitutional: normal, well developed, well nourished, alert, in no distress and non-toxic and no overt pain behavior    Eyes: anicteric  HEENT: grossly intact  Neck: supple, symmetric, trachea midline and no masses   Pulmonary:even and unlabored  Cardiovascular:No edema or pitting edema present  Skin:Normal without rashes or lesions and well hydrated  Psychiatric:Mood and affect appropriate  Neurologic:Cranial Nerves II-XII grossly intact  Musculoskeletal:Minimal tenderness at the IPG site; 5/5 strength in the bilateral lower extremity flexors extensors except with right foot dorsiflexion which is 3/5

## 2023-05-26 NOTE — PATIENT INSTRUCTIONS
Complex Regional Pain Syndrome   WHAT YOU NEED TO KNOW:   What is complex regional pain syndrome? Complex regional pain syndrome (CRPS) is a painful condition that can occur in one or more of your limbs  CRPS has 2 types  Type 1 has no known cause  Type 2 is caused by nerve damage  What increases my risk for CRPS? Trauma that causes a sprain or a broken bone    Surgery    Cuts, puncture wounds, burns, or frostbite    Immobilization of a limb, such as in a cast    Medical conditions, such as heart attack or stroke    What are the signs and symptoms of CRPS? You may have any of the following in the limb:  Severe burning, stabbing pain    Tingling or electrical feeling    Increased sweating    Swelling and muscle spasms    Speckled skin that is sensitive to the touch    Joint stiffness or loss of movement    Hair loss and nail changes    How is CRPS diagnosed? Your healthcare provider will ask if you have had any recent injuries, surgeries, or traumas  Tell your provider about your medical conditions  You may also need any of the following:  Blood tests  may be needed to give your providers information about how your body is working  X-rays or bone scans  may show problems with your bones  An MRI  takes pictures of the inside of your body  This test may be used to check for problems and changes in your bones and tissues  Never  enter the MRI room with any metal objects  This can cause serious injury  Tell the provider if you have any metal implants in your body  Skin temperature and sweat tests  may be done to check for temperature differences between your injured and uninjured limbs  Sweat testing is done to check for increased sweating in your injured limb  Nerve conduction tests  may be done to check for damaged nerves in your injured limb  How is CRPS treated? Medicines  will be given to treat swelling, nerve pain, and blood flow   Ask for written instructions so that your medicines reduce your pain  Physical therapy  may be ordered by your provider  A physical therapist can teach you exercises to help improve movement and strength in your injured limb  A nerve block  may be done to stop the pain signals that go to your brain  Your provider will inject a substance into or around the nerve that is causing your pain  Surgery  may be needed to destroy the nerves that are causing your pain  You may need a device implanted to deliver constant pain medicine or to send signals to the spinal cord to control your pain  If your CRPS becomes severe enough to affect your quality of life, the limb may need to be amputated  How can I help manage my CRPS? Stay active  Movement of the injured limb may help lower your pain  Find ways to relax  Your pain may increase when you are anxious  Ask your provider about relaxation techniques that help lower pain  When should I contact my healthcare provider? You have limb pain that you think is worse than any recent injury should cause  You have limb pain that continues after an injury heals  You have limb pain caused by things that do not normally cause pain, such as a touch to the skin  You have swelling, sweating, and skin temperature changes in an injured limb  You have trouble moving an injured limb  CARE AGREEMENT:   You have the right to help plan your care  Learn about your health condition and how it may be treated  Discuss treatment options with your healthcare providers to decide what care you want to receive  You always have the right to refuse treatment  The above information is an  only  It is not intended as medical advice for individual conditions or treatments  Talk to your doctor, nurse or pharmacist before following any medical regimen to see if it is safe and effective for you    © Copyright Brad Sanchez 2022 Information is for End User's use only and may not be sold, redistributed or otherwise used for commercial purposes

## 2023-06-05 ENCOUNTER — TELEPHONE (OUTPATIENT)
Dept: PAIN MEDICINE | Facility: CLINIC | Age: 33
End: 2023-06-05

## 2023-06-06 NOTE — TELEPHONE ENCOUNTER
Please let patient know that x-rays of the lumbar spine that were done when he was in the office show that his stimulator leads are still well-positioned

## 2023-06-16 ENCOUNTER — OFFICE VISIT (OUTPATIENT)
Dept: URGENT CARE | Facility: MEDICAL CENTER | Age: 33
End: 2023-06-16
Payer: MEDICARE

## 2023-06-16 VITALS
SYSTOLIC BLOOD PRESSURE: 102 MMHG | WEIGHT: 180 LBS | HEIGHT: 73 IN | DIASTOLIC BLOOD PRESSURE: 62 MMHG | TEMPERATURE: 97.8 F | HEART RATE: 56 BPM | BODY MASS INDEX: 23.86 KG/M2 | RESPIRATION RATE: 18 BRPM | OXYGEN SATURATION: 98 %

## 2023-06-16 DIAGNOSIS — L23.7 POISON IVY: Primary | ICD-10-CM

## 2023-06-16 PROCEDURE — 99213 OFFICE O/P EST LOW 20 MIN: CPT | Performed by: PHYSICIAN ASSISTANT

## 2023-06-16 PROCEDURE — G0463 HOSPITAL OUTPT CLINIC VISIT: HCPCS | Performed by: PHYSICIAN ASSISTANT

## 2023-06-16 RX ORDER — PREDNISONE 10 MG/1
TABLET ORAL
Qty: 21 TABLET | Refills: 0 | Status: SHIPPED | OUTPATIENT
Start: 2023-06-16

## 2023-06-17 NOTE — PROGRESS NOTES
[address] [facility]        NAME: Kandy Aase is a 34 y o  male  : 1990    MRN: [id number]  DATE: 2023  TIME: 10:42 PM    Assessment and Plan   Poison ivy [L23 7]  1  Poison ivy  predniSONE 10 mg tablet            Patient Instructions       Follow up with PCP in 3-5 days  Proceed to  ER if symptoms worsen  Chief Complaint     Chief Complaint   Patient presents with   • Poison Ivy     Pt has itchy, tingling poison ivy rash on forearms x4 days         History of Present Illness       Here for evaluation of an itchy rash on his bilateral forearms  Patient did have exposure to to possible poison ivy or poison sumac  He denies any fevers, chills  Poison Ivy        Review of Systems   Review of Systems   Constitutional: Negative  Respiratory: Negative  Musculoskeletal: Negative  Skin: Positive for rash  Neurological: Negative            Current Medications       Current Outpatient Medications:   •  ALPRAZolam (XANAX) 2 MG tablet, TAKE 2 TABLETS TWICE A DAY AND 1 TABLET AT BEDTIME, Disp: 150 tablet, Rfl: 3  •  predniSONE 10 mg tablet, Six tablets day 1; 5 tablets day 2; 4 tablets day 3; 3 tablets day 4; 2 tablets day 5; and 1 tablet day 6, Disp: 21 tablet, Rfl: 0    Current Allergies     Allergies as of 2023   • (No Known Allergies)            The following portions of the patient's history were reviewed and updated as appropriate: allergies, current medications, past family history, past medical history, past social history, past surgical history and problem list      Past Medical History:   Diagnosis Date   • Abnormal NCS (nerve conduction studies)    • Anxiety    • Chronic pain disorder    • Intervertebral disc disorder with radiculopathy of lumbosacral region 2019       Past Surgical History:   Procedure Laterality Date   • ARTHROSCOPIC REPAIR ACL Right    • KNEE ARTHROSCOPY Right     ACL   • OTHER SURGICAL HISTORY      trial stimulator   • [place] PRQ IMPLTJ NSTIM "ELECTRODE ARRAY EPIDURAL Right 7/12/2019    Procedure: PLACEMENT DORSAL ROOT GANGLION (DRG) ELECTRODE RIGHT L5, S1 W IMPLANTABLE PULSE GENERATOR, RIGHT;  Surgeon: Cuauhtemoc Silva MD;  Location: [facility];  Service: Neurosurgery   • ROTATOR CUFF REPAIR Right        Family History   Problem Relation Age of Onset   • No Known Problems Mother    • No Known Problems Father          Medications have been verified  Objective   /62   Pulse 56   Temp 97 8 °F (36 6 °C) (Temporal)   Resp 18   Ht 6' 1\" (1 854 m)   Wt 81 6 kg (180 lb)   SpO2 98%   BMI 23 75 kg/m²   No LMP for male patient  Physical Exam     Physical Exam  Vitals and nursing note reviewed  Constitutional:       General: He is not in acute distress  Appearance: Normal appearance  He is well-developed  He is not ill-appearing, toxic-appearing or diaphoretic  HENT:      Head: Normocephalic and atraumatic  Eyes:      Extraocular Movements: Extraocular movements intact  Conjunctiva/sclera: Conjunctivae normal       Pupils: Pupils are equal, round, and reactive to light  Skin:     General: Skin is warm and dry  Findings: Rash (Macular papular vesicular rash on the bilateral forearms  There are some large patches with some linear streaking throughout the rash  The rash is very pruritic  There is some swelling of the right forearm  No deep erythema  No pustules  ) present  Neurological:      General: No focal deficit present  Mental Status: He is alert and oriented to person, place, and time  Psychiatric:         Mood and Affect: Mood normal          Behavior: Behavior normal          Thought Content:  Thought content normal          Judgment: Judgment normal                    "

## 2023-06-17 NOTE — PATIENT INSTRUCTIONS
Avoid heat exposure    Cool compresses as directed    Elevate your arms frequently for swelling      You may take over-the-counter Zyrtec or Benadryl as needed    Follow-up with your primary care physician if symptoms persist    Go to the emergency room for any worsening symptoms

## 2023-06-18 ENCOUNTER — RA CDI HCC (OUTPATIENT)
Dept: OTHER | Facility: HOSPITAL | Age: 33
End: 2023-06-18

## 2023-06-26 ENCOUNTER — OFFICE VISIT (OUTPATIENT)
Dept: FAMILY MEDICINE CLINIC | Facility: CLINIC | Age: 33
End: 2023-06-26
Payer: MEDICARE

## 2023-06-26 VITALS
TEMPERATURE: 98.2 F | WEIGHT: 182.4 LBS | OXYGEN SATURATION: 98 % | SYSTOLIC BLOOD PRESSURE: 120 MMHG | HEART RATE: 68 BPM | DIASTOLIC BLOOD PRESSURE: 82 MMHG | HEIGHT: 73 IN | BODY MASS INDEX: 24.18 KG/M2

## 2023-06-26 DIAGNOSIS — F41.9 ANXIETY: ICD-10-CM

## 2023-06-26 DIAGNOSIS — M51.17 INTERVERTEBRAL DISC DISORDER WITH RADICULOPATHY OF LUMBOSACRAL REGION: ICD-10-CM

## 2023-06-26 DIAGNOSIS — G90.521 COMPLEX REGIONAL PAIN SYNDROME TYPE 1 OF RIGHT LOWER EXTREMITY: Primary | ICD-10-CM

## 2023-06-26 DIAGNOSIS — G71.19 PARAMYOTONIA CONGENITA: ICD-10-CM

## 2023-06-26 PROCEDURE — 99214 OFFICE O/P EST MOD 30 MIN: CPT | Performed by: FAMILY MEDICINE

## 2023-06-26 RX ORDER — ALPRAZOLAM 2 MG/1
TABLET ORAL
Qty: 150 TABLET | Refills: 3 | Status: SHIPPED | OUTPATIENT
Start: 2023-06-26

## 2023-07-06 ENCOUNTER — RA CDI HCC (OUTPATIENT)
Dept: OTHER | Facility: HOSPITAL | Age: 33
End: 2023-07-06

## 2023-07-06 NOTE — PROGRESS NOTES
[address] [facility] coding opportunities       Chart reviewed, no opportunity found: CHART REVIEWED, NO OPPORTUNITY FOUND        Patients Insurance     Medicare Insurance: Medicare

## 2023-07-13 ENCOUNTER — OFFICE VISIT (OUTPATIENT)
Dept: FAMILY MEDICINE CLINIC | Facility: CLINIC | Age: 33
End: 2023-07-13
Payer: MEDICARE

## 2023-07-13 VITALS
WEIGHT: 180 LBS | OXYGEN SATURATION: 99 % | TEMPERATURE: 98 F | DIASTOLIC BLOOD PRESSURE: 80 MMHG | SYSTOLIC BLOOD PRESSURE: 122 MMHG | BODY MASS INDEX: 23.86 KG/M2 | HEART RATE: 82 BPM | HEIGHT: 73 IN

## 2023-07-13 DIAGNOSIS — F41.9 ANXIETY: ICD-10-CM

## 2023-07-13 DIAGNOSIS — G90.521 COMPLEX REGIONAL PAIN SYNDROME TYPE 1 OF RIGHT LOWER EXTREMITY: Primary | ICD-10-CM

## 2023-07-13 DIAGNOSIS — G47.00 INSOMNIA, UNSPECIFIED TYPE: ICD-10-CM

## 2023-07-13 DIAGNOSIS — M51.17 INTERVERTEBRAL DISC DISORDER WITH RADICULOPATHY OF LUMBOSACRAL REGION: ICD-10-CM

## 2023-07-13 DIAGNOSIS — G89.4 CHRONIC PAIN SYNDROME: ICD-10-CM

## 2023-07-13 DIAGNOSIS — R94.31 PROLONGED QT INTERVAL: ICD-10-CM

## 2023-07-13 DIAGNOSIS — G71.19 PARAMYOTONIA CONGENITA: ICD-10-CM

## 2023-07-13 PROCEDURE — G0439 PPPS, SUBSEQ VISIT: HCPCS | Performed by: FAMILY MEDICINE

## 2023-07-13 PROCEDURE — 99214 OFFICE O/P EST MOD 30 MIN: CPT | Performed by: FAMILY MEDICINE

## 2023-07-13 NOTE — PATIENT INSTRUCTIONS
Medicare Preventive Visit Patient Instructions  Thank you for completing your Welcome to Medicare Visit or Medicare Annual Wellness Visit today. Your next wellness visit will be due in one year (7/13/2024). The screening/preventive services that you may require over the next 5-10 years are detailed below. Some tests may not apply to you based off risk factors and/or age. Screening tests ordered at today's visit but not completed yet may show as past due. Also, please note that scanned in results may not display below. Preventive Screenings:  Service Recommendations Previous Testing/Comments   Colorectal Cancer Screening  · Colonoscopy    · Fecal Occult Blood Test (FOBT)/Fecal Immunochemical Test (FIT)  · Fecal DNA/Cologuard Test  · Flexible Sigmoidoscopy Age: 43-73 years old   Colonoscopy: every 10 years (May be performed more frequently if at higher risk)  OR  FOBT/FIT: every 1 year  OR  Cologuard: every 3 years  OR  Sigmoidoscopy: every 5 years  Screening may be recommended earlier than age 39 if at higher risk for colorectal cancer. Also, an individualized decision between you and your healthcare provider will decide whether screening between the ages of 77-80 would be appropriate.  Colonoscopy: Not on file  FOBT/FIT: Not on file  Cologuard: Not on file  Sigmoidoscopy: Not on file          Prostate Cancer Screening Individualized decision between patient and health care provider in men between ages of 53-66   Medicare will cover every 12 months beginning on the day after your 50th birthday PSA: No results in last 5 years     Screening Not Indicated     Hepatitis C Screening Once for adults born between [address] [place] [address]  More frequently in patients at high risk for Hepatitis C Hep C Antibody: Not on file        Diabetes Screening 1-2 times per year if you're at risk for diabetes or have pre-diabetes Fasting glucose: 89 mg/dL (7/29/2020)  A1C: 4.8 % (7/8/2019)      Cholesterol Screening Once every 5 years if you don't have a lipid disorder. May order more often based on risk factors. Lipid panel: Not on file         Other Preventive Screenings Covered by Medicare:  1. Abdominal Aortic Aneurysm (AAA) Screening: covered once if your at risk. You're considered to be at risk if you have a family history of AAA or a male between the age of 70-76 who smoking at least 100 cigarettes in your lifetime. 2. Lung Cancer Screening: covers low dose CT scan once per year if you meet all of the following conditions: (1) Age 48-67; (2) No signs or symptoms of lung cancer; (3) Current smoker or have quit smoking within the last 15 years; (4) You have a tobacco smoking history of at least 20 pack years (packs per day x number of years you smoked); (5) You get a written order from a healthcare provider. 3. Glaucoma Screening: covered annually if you're considered high risk: (1) You have diabetes OR (2) Family history of glaucoma OR (3)  aged 48 and older OR (3)  American aged 72 and older  3. Osteoporosis Screening: covered every 2 years if you meet one of the following conditions: (1) Have a vertebral abnormality; (2) On glucocorticoid therapy for more than 3 months; (3) Have primary hyperparathyroidism; (4) On osteoporosis medications and need to assess response to drug therapy. 5. HIV Screening: covered annually if you're between the age of 14-79. Also covered annually if you are younger than 13 and older than 72 with risk factors for HIV infection. For pregnant patients, it is covered up to 3 times per pregnancy.     Immunizations:  Immunization Recommendations   Influenza Vaccine Annual influenza vaccination during flu season is recommended for all persons aged >= 6 months who do not have contraindications   Pneumococcal Vaccine   * Pneumococcal conjugate vaccine = PCV13 (Prevnar 13), PCV15 (Vaxneuvance), PCV20 (Prevnar 20)  * Pneumococcal polysaccharide vaccine = PPSV23 (Pneumovax) Adults 20-63 years old: 1-3 doses may be recommended based on certain risk factors  Adults 72 years old: 1-2 doses may be recommended based off what pneumonia vaccine you previously received   Hepatitis B Vaccine 3 dose series if at intermediate or high risk (ex: diabetes, end stage renal disease, liver disease)   Tetanus (Td) Vaccine - COST NOT COVERED BY MEDICARE PART B Following completion of primary series, a booster dose should be given every 10 years to maintain immunity against tetanus. Td may also be given as tetanus wound prophylaxis. Tdap Vaccine - COST NOT COVERED BY MEDICARE PART B Recommended at least once for all adults. For pregnant patients, recommended with each pregnancy. Shingles Vaccine (Shingrix) - COST NOT COVERED BY MEDICARE PART B  2 shot series recommended in those aged 48 and above     Health Maintenance Due:      Topic Date Due   • Hepatitis C Screening  Never done   • HIV Screening  Discontinued     Immunizations Due:      Topic Date Due   • COVID-19 Vaccine (1) Never done   • Influenza Vaccine (1) 09/01/2023     Advance Directives   What are advance directives? Advance directives are legal documents that state your wishes and plans for medical care. These plans are made ahead of time in case you lose your ability to make decisions for yourself. Advance directives can apply to any medical decision, such as the treatments you want, and if you want to donate organs. What are the types of advance directives? There are many types of advance directives, and each state has rules about how to use them. You may choose a combination of any of the following:  · Living will: This is a written record of the treatment you want. You can also choose which treatments you do not want, which to limit, and which to stop at a certain time. This includes surgery, medicine, IV fluid, and tube feedings. · Durable power of  for healthcare [facility]):   This is a written record that states who you want to make healthcare choices for you when you are unable to make them for yourself. This person, called a proxy, is usually a family member or a friend. You may choose more than 1 proxy. · Do not resuscitate (DNR) order:  A DNR order is used in case your heart stops beating or you stop breathing. It is a request not to have certain forms of treatment, such as CPR. A DNR order may be included in other types of advance directives. · Medical directive: This covers the care that you want if you are in a coma, near death, or unable to make decisions for yourself. You can list the treatments you want for each condition. Treatment may include pain medicine, surgery, blood transfusions, dialysis, IV or tube feedings, and a ventilator (breathing machine). · Values history: This document has questions about your views, beliefs, and how you feel and think about life. This information can help others choose the care that you would choose. Why are advance directives important? An advance directive helps you control your care. Although spoken wishes may be used, it is better to have your wishes written down. Spoken wishes can be misunderstood, or not followed. Treatments may be given even if you do not want them. An advance directive may make it easier for your family to make difficult choices about your care. © Copyright [employer] 2018 Information is for End User's use only and may not be sold, redistributed or otherwise used for commercial purposes.  All illustrations and images included in CareNotes® are the copyrighted property of [employer] or [address] [place], [place] [address]ols

## 2023-07-13 NOTE — PROGRESS NOTES
Assessment and Plan:   Return visit in 3 months  Problem List Items Addressed This Visit        Nervous and Auditory    Complex regional pain syndrome type 1 of right lower extremity - Primary     Follow-up with pain management         Intervertebral disc disorder with radiculopathy of lumbosacral region       Musculoskeletal and Integument    Paramyotonia congenita     Follow-up with neurology. Other    Anxiety     Continue Xanax         Insomnia    Prolonged QT interval    Chronic pain syndrome       Depression Screening and Follow-up Plan: Patient was screened for depression during today's encounter. They screened negative with a PHQ-2 score of 0. Preventive health issues were discussed with patient, and age appropriate screening tests were ordered as noted in patient's After Visit Summary. Personalized health advice and appropriate referrals for health education or preventive services given if needed, as noted in patient's After Visit Summary. History of Present Illness:     Patient presents for a Medicare Wellness Visit    Patient comes in for annual wellness visit. He has pain stimulator for lumbar radiculopathy right lower extremity as well as complex regional pain syndrome. He also has paramyotonia congenita and chronic anxiety. Patient Care Team:  Karolina Alejo MD as PCP - General (Family Medicine)  Karolina Alejo MD as PCP - [address] [place] [address] (RTE)  Nam Eubanks MD (Pain Medicine)     Review of Systems:     Review of Systems   Constitutional: Negative. Respiratory: Negative. Cardiovascular: Negative. Gastrointestinal: Negative. Musculoskeletal: Positive for back pain. Psychiatric/Behavioral: Positive for sleep disturbance. The patient is nervous/anxious.          Problem List:     Patient Active Problem List   Diagnosis   • Anxiety   • Insomnia   • Complex regional pain syndrome type 1 of right lower extremity   • Prolonged QT interval   • Raynaud phenomenon • Chronic pain syndrome   • Intervertebral disc disorder with radiculopathy of lumbosacral region   • Paramyotonia congenita   • Benzodiazepine dependence (HCC)      Past Medical and Surgical History:     Past Medical History:   Diagnosis Date   • Abnormal NCS (nerve conduction studies)    • Anxiety    • Chronic pain disorder    • Intervertebral disc disorder with radiculopathy of lumbosacral region 2019     Past Surgical History:   Procedure Laterality Date   • ARTHROSCOPIC REPAIR ACL Right    • KNEE ARTHROSCOPY Right     ACL   • OTHER SURGICAL HISTORY      trial stimulator   • SD PRQ IMPLTJ NSTIM ELECTRODE ARRAY EPIDURAL Right 2019    Procedure: PLACEMENT DORSAL ROOT GANGLION (DRG) ELECTRODE RIGHT L5, S1 W IMPLANTABLE PULSE GENERATOR, RIGHT;  Surgeon: Ivana Nelson MD;  Location:  MAIN OR;  Service: Neurosurgery   • ROTATOR CUFF REPAIR Right       Family History:     Family History   Problem Relation Age of Onset   • No Known Problems Mother    • No Known Problems Father       Social History:     Social History     Socioeconomic History   • Marital status: Single     Spouse name: None   • Number of children: None   • Years of education: None   • Highest education level: None   Occupational History   • Occupation: Full-time employment   Tobacco Use   • Smoking status: Former     Types: Cigarettes     Start date:      Quit date: 2019     Years since quittin.5   • Smokeless tobacco: Former   • Tobacco comments:     encourged smoking cessation   Vaping Use   • Vaping Use: Never used   Substance and Sexual Activity   • Alcohol use: Never   • Drug use: No   • Sexual activity: None   Other Topics Concern   • None   Social History Narrative    Always uses a seat belt    Lives with family    Exercises regularly     Social Determinants of Health     Financial Resource Strain: Low Risk  (2023)    Overall Financial Resource Strain (CARDIA)    • Difficulty of Paying Living Expenses: Not very hard Food Insecurity: Not on file   Transportation Needs: No Transportation Needs (7/7/2023)    PRAPARE - Transportation    • Lack of Transportation (Medical): No    • Lack of Transportation (Non-Medical): No   Physical Activity: Not on file   Stress: Not on file   Social Connections: Not on file   Intimate Partner Violence: Not on file   Housing Stability: Not on file      Medications and Allergies:     Current Outpatient Medications   Medication Sig Dispense Refill   • ALPRAZolam (XANAX) 2 MG tablet TAKE 2 TABLETS TWICE A DAY AND 1 TABLET AT BEDTIME 150 tablet 3     No current facility-administered medications for this visit. No Known Allergies   Immunizations: There is no immunization history for the selected administration types on file for this patient. Health Maintenance:         Topic Date Due   • Hepatitis C Screening  Never done   • HIV Screening  Discontinued         Topic Date Due   • COVID-19 Vaccine (1) Never done   • Influenza Vaccine (1) 09/01/2023      Medicare Screening Tests and Risk Assessments:     Lana García is here for his Subsequent Wellness visit. Last Medicare Wellness visit information reviewed, patient interviewed and updates made to the record today. Health Risk Assessment:   Patient rates overall health as good. Patient feels that their physical health rating is slightly better. Patient is satisfied with their life. Eyesight was rated as same. Hearing was rated as same. Patient feels that their emotional and mental health rating is same. Patients states they are never, rarely angry. Patient states they are always unusually tired/fatigued. Pain experienced in the last 7 days has been a lot. Patient's pain rating has been 8/10. Patient states that he has experienced no weight loss or gain in last 6 months. Depression Screening:   PHQ-2 Score: 0      Fall Risk Screening:    In the past year, patient has experienced: no history of falling in past year      Home Safety:  Patient does not have trouble with stairs inside or outside of their home. Patient has working smoke alarms and has working carbon monoxide detector. Home safety hazards include: none. Nutrition:   Current diet is Regular. Medications:   Patient is not currently taking any over-the-counter supplements. Patient is able to manage medications. Activities of Daily Living (ADLs)/Instrumental Activities of Daily Living (IADLs):   Walk and transfer into and out of bed and chair?: Yes  Dress and groom yourself?: Yes    Bathe or shower yourself?: Yes    Feed yourself?  Yes  Do your laundry/housekeeping?: Yes  Manage your money, pay your bills and track your expenses?: Yes  Make your own meals?: Yes    Do your own shopping?: Yes    Previous Hospitalizations:   Any hospitalizations or ED visits within the last 12 months?: No      Advance Care Planning:   Living will: No    Durable POA for healthcare: No    Advanced directive: No    Advanced directive counseling given: Yes    Five wishes given: Yes    End of Life Decisions reviewed with patient: Yes    Provider agrees with end of life decisions: Yes      PREVENTIVE SCREENINGS      Cardiovascular Screening:    General: Risks and Benefits Discussed    Due for: Lipid Panel      Diabetes Screening:     General: Risks and Benefits Discussed    Due for: Blood Glucose      Colorectal Cancer Screening:     General: Screening Not Indicated      Prostate Cancer Screening:    General: Screening Not Indicated      Osteoporosis Screening:    General: Screening Not Indicated      Abdominal Aortic Aneurysm (AAA) Screening:    Risk factors include: tobacco use        General: Screening Not Indicated      Lung Cancer Screening:     General: Screening Not Indicated      Hepatitis C Screening:    General: Risks and Benefits Discussed    Hep C Screening Accepted: Yes      Screening, Brief Intervention, and Referral to Treatment (SBIRT)    Screening  Typical number of drinks in a day: 0  Typical number of drinks in a week: 0  Interpretation: Low risk drinking behavior. AUDIT-C Screenin) How often did you have a drink containing alcohol in the past year? never  2) How many drinks did you have on a typical day when you were drinking in the past year? 0  3) How often did you have 6 or more drinks on one occasion in the past year? never    AUDIT-C Score: 0  Interpretation: Score 0-3 (male): Negative screen for alcohol misuse    Single Item Drug Screening:  How often have you used an illegal drug (including marijuana) or a prescription medication for non-medical reasons in the past year? never    Single Item Drug Screen Score: 0  Interpretation: Negative screen for possible drug use disorder    Brief Intervention  Alcohol & drug use screenings were reviewed. No concerns regarding substance use disorder identified. Other Counseling Topics:   Car/seat belt/driving safety and sunscreen. No results found. Physical Exam:     /80 (BP Location: Left arm, Patient Position: Sitting, Cuff Size: Adult)   Pulse 82   Temp 98 °F (36.7 °C) (Tympanic)   Ht 6' 1" (1.854 m)   Wt 81.6 kg (180 lb)   SpO2 99%   BMI 23.75 kg/m²     Physical Exam  Vitals and nursing note reviewed. Constitutional:       Appearance: Normal appearance. He is well-developed. HENT:      Head: Normocephalic and atraumatic. Eyes:      Conjunctiva/sclera: Conjunctivae normal.   Cardiovascular:      Rate and Rhythm: Normal rate and regular rhythm. Heart sounds: Normal heart sounds. Pulmonary:      Effort: Pulmonary effort is normal.      Breath sounds: Normal breath sounds. Abdominal:      General: Bowel sounds are normal.      Palpations: Abdomen is soft. There is no mass. Tenderness: There is no abdominal tenderness. Musculoskeletal:         General: No swelling. Cervical back: Neck supple. Skin:     General: Skin is warm and dry. Capillary Refill: Capillary refill takes less than 2 seconds. Neurological:      Mental Status: He is alert.    Psychiatric:         Mood and Affect: Mood normal.         Behavior: Behavior normal.          Jenna Bautista MD

## 2023-10-04 ENCOUNTER — OFFICE VISIT (OUTPATIENT)
Dept: FAMILY MEDICINE CLINIC | Facility: CLINIC | Age: 33
End: 2023-10-04
Payer: MEDICARE

## 2023-10-04 VITALS
DIASTOLIC BLOOD PRESSURE: 74 MMHG | WEIGHT: 186 LBS | TEMPERATURE: 97.4 F | OXYGEN SATURATION: 99 % | BODY MASS INDEX: 24.65 KG/M2 | HEIGHT: 73 IN | SYSTOLIC BLOOD PRESSURE: 116 MMHG | HEART RATE: 82 BPM

## 2023-10-04 DIAGNOSIS — G47.00 INSOMNIA, UNSPECIFIED TYPE: ICD-10-CM

## 2023-10-04 DIAGNOSIS — G90.521 COMPLEX REGIONAL PAIN SYNDROME TYPE 1 OF RIGHT LOWER EXTREMITY: Primary | ICD-10-CM

## 2023-10-04 DIAGNOSIS — F41.9 ANXIETY: ICD-10-CM

## 2023-10-04 DIAGNOSIS — G71.19 PARAMYOTONIA CONGENITA: ICD-10-CM

## 2023-10-04 PROBLEM — F13.20 BENZODIAZEPINE DEPENDENCE (HCC): Status: RESOLVED | Noted: 2022-11-02 | Resolved: 2023-10-04

## 2023-10-04 PROCEDURE — 99213 OFFICE O/P EST LOW 20 MIN: CPT | Performed by: FAMILY MEDICINE

## 2023-10-04 RX ORDER — ALPRAZOLAM 2 MG/1
TABLET ORAL
Qty: 150 TABLET | Refills: 3 | Status: SHIPPED | OUTPATIENT
Start: 2023-10-04

## 2023-10-04 NOTE — PROGRESS NOTES
Name: Lizz Ochoa      : 1990      MRN: [id number]  Encounter Provider: Minnie Wong MD  Encounter Date: 10/4/2023   Encounter department: [address] [place] [address] [address]     1. Complex regional pain syndrome type 1 of right lower extremity    2. Paramyotonia congenita    3. Anxiety  -     ALPRAZolam (XANAX) 2 MG tablet; TAKE 2 TABLETS TWICE A DAY AND 1 TABLET AT BEDTIME    4. Insomnia, unspecified type           Subjective      Patient comes in for checkup. He continues take Xanax for chronic anxiety and to help him sleep at night. He continues to have pain in his right lower extremity from complex regional pain syndrome. He takes no medication for this. He continues to follow with neurology for this. Review of Systems   Constitutional: Negative. Respiratory: Negative. Current Outpatient Medications on File Prior to Visit   Medication Sig   • [DISCONTINUED] ALPRAZolam (XANAX) 2 MG tablet TAKE 2 TABLETS TWICE A DAY AND 1 TABLET AT BEDTIME       Objective     /74 (BP Location: Left arm, Patient Position: Sitting, Cuff Size: Standard)   Pulse 82   Temp (!) 97.4 °F (36.3 °C) (Tympanic)   Ht 6' 1" (1.854 m)   Wt 84.4 kg (186 lb)   SpO2 99%   BMI 24.54 kg/m²     Physical Exam  Constitutional:       Appearance: Normal appearance. He is well-developed. HENT:      Head: Normocephalic and atraumatic. Eyes:      Pupils: Pupils are equal, round, and reactive to light. Cardiovascular:      Rate and Rhythm: Normal rate and regular rhythm. Heart sounds: Normal heart sounds. Pulmonary:      Effort: Pulmonary effort is normal.      Breath sounds: Normal breath sounds. Abdominal:      General: Bowel sounds are normal.      Palpations: Abdomen is soft. Musculoskeletal:      Cervical back: Neck supple. Skin:     General: Skin is warm and dry. Neurological:      Mental Status: He is alert.    Psychiatric:         Mood and Affect: Mood normal.         Behavior: Behavior normal.         Thought Content:  Thought content normal.       Angel Witt MD

## 2024-01-11 ENCOUNTER — OFFICE VISIT (OUTPATIENT)
Dept: FAMILY MEDICINE CLINIC | Facility: CLINIC | Age: 34
End: 2024-01-11
Payer: MEDICARE

## 2024-01-11 VITALS
BODY MASS INDEX: 25.84 KG/M2 | SYSTOLIC BLOOD PRESSURE: 112 MMHG | TEMPERATURE: 98.5 F | WEIGHT: 195 LBS | HEART RATE: 72 BPM | OXYGEN SATURATION: 98 % | HEIGHT: 73 IN | DIASTOLIC BLOOD PRESSURE: 64 MMHG

## 2024-01-11 DIAGNOSIS — F11.20 CONTINUOUS OPIOID DEPENDENCE (HCC): Primary | ICD-10-CM

## 2024-01-11 DIAGNOSIS — G90.521 COMPLEX REGIONAL PAIN SYNDROME TYPE 1 OF RIGHT LOWER EXTREMITY: ICD-10-CM

## 2024-01-11 DIAGNOSIS — G47.00 INSOMNIA, UNSPECIFIED TYPE: ICD-10-CM

## 2024-01-11 DIAGNOSIS — F41.9 ANXIETY: ICD-10-CM

## 2024-01-11 DIAGNOSIS — G71.19 PARAMYOTONIA CONGENITA: ICD-10-CM

## 2024-01-11 PROBLEM — G89.4 CHRONIC PAIN SYNDROME: Status: RESOLVED | Noted: 2019-03-15 | Resolved: 2024-01-11

## 2024-01-11 PROBLEM — I73.00 RAYNAUD PHENOMENON: Status: RESOLVED | Noted: 2017-11-30 | Resolved: 2024-01-11

## 2024-01-11 PROCEDURE — 99214 OFFICE O/P EST MOD 30 MIN: CPT | Performed by: FAMILY MEDICINE

## 2024-01-11 RX ORDER — ALPRAZOLAM 2 MG/1
TABLET ORAL
Qty: 150 TABLET | Refills: 3 | Status: SHIPPED | OUTPATIENT
Start: 2024-01-11

## 2024-01-11 NOTE — PATIENT INSTRUCTIONS
Goals of care:  Maximize your health and quality of life by:   Increasing your level of function and activity  Decreasing the negative effects of pain on your life  Minimizing the risks and side effects of medications and ensuring safe use of opioid medication     Ways for you to help meet your goals:  Maintain a healthy lifestyle. This includes proper nutrition, regular physical activity as able, try for 8 hours of sleep per night, use stress reduction strategies, avoid triggers.      Risks and side effects of opioid use:  Prescription opioids carry serious risks of addiction and  overdose, especially with prolonged use. An opioid overdose,  often marked by slowed breathing, can cause sudden death. The  use of prescription opioids can have a number of side effects as  well, even when taken as directed:  Tolerance--meaning you might need to take more of a medication for the same pain relief  Physical dependence--meaning you have symptoms of withdrawal when a medication is stopped  Increased sensitivity to pain  Constipation  Nausea, vomiting, dry mouth  Sleepiness and dizziness   Confusion  Depression  Low levels of testosterone that can result in lower sex drive, energy, and strength  Itching and sweating    If you are prescribed opioids for pain:  Never take opioids in greater amounts or more often than prescribed.  Help prevent misuse and abuse.        - Never sell or share prescription opioids.        - Never use another person’s prescription opioids.  ‡Store prescription opioids in a secure place and out of reach of others (this may include visitors, children, friends, and family).  Safely dispose of unused prescription opioids: Find your community drug take-back program or your pharmacy mail-back program, or flush them down the toilet, following guidance from the Food and Drug Administration (www.fda.gov/Drugs/ResourcesForYou).  ‡Visit www.cdc.gov/drugoverdose to learn about the risks of opioid abuse and  overdose.  If you believe you may be struggling with addiction, tell your health care provider and ask for guidance or call [facility]’s National Helpline at 8-459-616-QPMJ.

## 2024-01-11 NOTE — PROGRESS NOTES
Assessment/Plan   Return visit in 4 months  Problem List Items Addressed This Visit       Anxiety    Relevant Medications    ALPRAZolam (XANAX) 2 MG tablet    Insomnia     Continue Xanax 2 mg nightly         Complex regional pain syndrome type 1 of right lower extremity    Paramyotonia congenita     Other Visit Diagnoses       Continuous opioid dependence (HCC)    -  Primary    Relevant Orders    [facility] All Prescribed Meds and Special Instructions    Amphetamines, Methamphetamines    Butalbital    Phenobarbital    Secobarbital    Alprazolam    Clonazepam    Diazepam, Temazepam, Oxazepam    Lorazepam    Gabapentin    Pregabalin    Cocaine    Heroin    Buprenorphine    Levorphanol    Meperidine    Naltrexone    Fentanyl    Methadone    Oxycodone    Tapentadol    THC    Tramadol    Codeine, Hydrocodone, Hydropmorphone, Morphine    Bath Salts    Ethyl Glucuronide/Ethyl Sulfate    Kratom    Spice    Methylphenidate    Phentermine    Validity Oxidant    Validity Creatinine    Validity pH    Validity Specific    Xylazine Definitive Test             Opioid Management Plan      Subjective         Screening Tools/Assessments:    PHQ-2/9:  Last PHQ-2 score: 0 (Last PHQ-2 date: 7/13/2023)      Opioid agreement:  Active Opioid agreement on file?: No    Opioid agreement signed date: 7/26/2022  Opioid agreement expiration date: 7/26/2023    Naloxone:  Currently prescribed Naloxone (Narcan): No      Patient comes in for checkup.  His muscle pain is doing bit worse in colder weather.  He is able to go to the gym and workout but notices no gains from lifting weight.  He continues to take Xanax for his muscle aches and to sleep at night.  He had appointment with neurology recently and no new medications were started.           PDMP Review       None           Review of Systems   HENT: Negative.     Cardiovascular: Negative.    Gastrointestinal: Negative.    Musculoskeletal:  Positive for myalgias.     Objective     /64    "Pulse 72   Temp 98.5 °F (36.9 °C)   Ht 6' 1\" (1.854 m)   Wt 88.5 kg (195 lb)   SpO2 98%   BMI 25.73 kg/m²     Physical Exam  Vitals and nursing note reviewed.   Constitutional:       Appearance: Normal appearance. He is well-developed.   HENT:      Head: Normocephalic and atraumatic.   Eyes:      Conjunctiva/sclera: Conjunctivae normal.   Cardiovascular:      Rate and Rhythm: Normal rate and regular rhythm.   Pulmonary:      Effort: Pulmonary effort is normal.      Breath sounds: Normal breath sounds.   Musculoskeletal:         General: No swelling.      Cervical back: Neck supple.   Skin:     General: Skin is warm and dry.      Capillary Refill: Capillary refill takes less than 2 seconds.   Neurological:      Mental Status: He is alert.   Psychiatric:         Mood and Affect: Mood normal.         Peng Mendez MD          "

## 2024-01-16 LAB
4-HYDROXY XYLAZINE: NEGATIVE NG/ML
6MAM UR QL CFM: NEGATIVE NG/ML
7AMINOCLONAZEPAM UR QL CFM: NEGATIVE NG/ML
A-OH ALPRAZ UR QL CFM: NORMAL NG/ML
ACCEPTABLE CREAT UR QL: NORMAL MG/DL
ACCEPTIBLE SP GR UR QL: NORMAL
AMPHET UR QL CFM: NEGATIVE NG/ML
BUPRENORPHINE UR QL CFM: NEGATIVE NG/ML
BUTALBITAL UR QL CFM: NEGATIVE NG/ML
BZE UR QL CFM: NEGATIVE NG/ML
CODEINE UR QL CFM: NEGATIVE NG/ML
EDDP UR QL CFM: NEGATIVE NG/ML
ETHYL GLUCURONIDE UR QL CFM: NEGATIVE NG/ML
ETHYL SULFATE UR QL SCN: NEGATIVE NG/ML
EUTYLONE UR QL: NEGATIVE NG/ML
FENTANYL UR QL CFM: NEGATIVE NG/ML
GLIADIN IGG SER IA-ACNC: NEGATIVE NG/ML
HYDROCODONE UR QL CFM: NEGATIVE NG/ML
HYDROMORPHONE UR QL CFM: NEGATIVE NG/ML
LORAZEPAM UR QL CFM: NEGATIVE NG/ML
ME-PHENIDATE UR QL CFM: NEGATIVE NG/ML
MEPERIDINE UR QL CFM: NEGATIVE NG/ML
METHADONE UR QL CFM: NEGATIVE NG/ML
METHAMPHET UR QL CFM: NEGATIVE NG/ML
MORPHINE UR QL CFM: NEGATIVE NG/ML
NALTREXONE UR QL CFM: NEGATIVE NG/ML
NITRITE UR QL: NORMAL UG/ML
NORBUPRENORPHINE UR QL CFM: NEGATIVE NG/ML
NORDIAZEPAM UR QL CFM: NEGATIVE NG/ML
NORFENTANYL UR QL CFM: NEGATIVE NG/ML
NORHYDROCODONE UR QL CFM: NEGATIVE NG/ML
NORMEPERIDINE UR QL CFM: NEGATIVE NG/ML
NOROXYCODONE UR QL CFM: NEGATIVE NG/ML
OXAZEPAM UR QL CFM: NEGATIVE NG/ML
OXYCODONE UR QL CFM: NEGATIVE NG/ML
OXYMORPHONE UR QL CFM: NEGATIVE NG/ML
PARA-FLUOROFENTANYL QUANTIFICATION: NORMAL NG/ML
PHENOBARB UR QL CFM: NEGATIVE NG/ML
RESULT ALL_PRESCRIBED MEDS AND SPECIAL INSTRUCTIONS: NORMAL
SECOBARBITAL UR QL CFM: NEGATIVE NG/ML
SL AMB 4-ANPP QUANTIFICATION: NORMAL NG/ML
SL AMB 5F-ADB-M7 METABOLITE QUANTIFICATION: NEGATIVE NG/ML
SL AMB 7-OH-MITRAGYNINE (KRATOM ALKALOID) QUANTIFICATION: NEGATIVE NG/ML
SL AMB AB-FUBINACA-M3 METABOLITE QUANTIFICATION: NEGATIVE NG/ML
SL AMB ACETYL FENTANYL QUANTIFICATION: NORMAL NG/ML
SL AMB ACETYL NORFENTANYL QUANTIFICATION: NORMAL NG/ML
SL AMB ACRYL FENTANYL QUANTIFICATION: NORMAL NG/ML
SL AMB CARFENTANIL QUANTIFICATION: NORMAL NG/ML
SL AMB CTHC (MARIJUANA METABOLITE) QUANTIFICATION: ABNORMAL NG/ML
SL AMB DEXTRORPHAN (DEXTROMETHORPHAN METABOLITE) QUANT: NEGATIVE NG/ML
SL AMB GABAPENTIN QUANTIFICATION: NEGATIVE
SL AMB JWH018 METABOLITE QUANTIFICATION: NEGATIVE NG/ML
SL AMB JWH073 METABOLITE QUANTIFICATION: NEGATIVE NG/ML
SL AMB MDMB-FUBINACA-M1 METABOLITE QUANTIFICATION: NEGATIVE NG/ML
SL AMB METHYLONE QUANTIFICATION: NEGATIVE NG/ML
SL AMB N-DESMETHYL-TRAMADOL QUANTIFICATION: NEGATIVE NG/ML
SL AMB PHENTERMINE QUANTIFICATION: NEGATIVE NG/ML
SL AMB PREGABALIN QUANTIFICATION: NEGATIVE
SL AMB RCS4 METABOLITE QUANTIFICATION: NEGATIVE NG/ML
SL AMB RITALINIC ACID QUANTIFICATION: NEGATIVE NG/ML
SMOOTH MUSCLE AB TITR SER IF: NEGATIVE NG/ML
SPECIMEN DRAWN SERPL: NEGATIVE NG/ML
SPECIMEN PH ACCEPTABLE UR: NORMAL
TAPENTADOL UR QL CFM: NEGATIVE NG/ML
TEMAZEPAM UR QL CFM: NEGATIVE NG/ML
TRAMADOL UR QL CFM: NEGATIVE NG/ML
URATE/CREAT 24H UR: NEGATIVE NG/ML
XYLAZINE QUANTIFICATION: NEGATIVE NG/ML

## 2024-03-04 ENCOUNTER — APPOINTMENT (EMERGENCY)
Dept: CT IMAGING | Facility: HOSPITAL | Age: 34
End: 2024-03-04
Payer: MEDICARE

## 2024-03-04 ENCOUNTER — HOSPITAL ENCOUNTER (EMERGENCY)
Facility: HOSPITAL | Age: 34
Discharge: HOME | End: 2024-03-05
Attending: EMERGENCY MEDICINE
Payer: MEDICARE

## 2024-03-04 VITALS
SYSTOLIC BLOOD PRESSURE: 147 MMHG | OXYGEN SATURATION: 99 % | HEART RATE: 86 BPM | RESPIRATION RATE: 18 BRPM | TEMPERATURE: 97.6 F | DIASTOLIC BLOOD PRESSURE: 75 MMHG

## 2024-03-04 DIAGNOSIS — S01.112A EYEBROW LACERATION, LEFT, INITIAL ENCOUNTER: Primary | ICD-10-CM

## 2024-03-04 DIAGNOSIS — S06.0XAA CONCUSSION: ICD-10-CM

## 2024-03-04 DIAGNOSIS — S09.90XA CLOSED HEAD INJURY, INITIAL ENCOUNTER: ICD-10-CM

## 2024-03-04 DIAGNOSIS — T14.8XXA CONTUSION OF SOFT TISSUE: ICD-10-CM

## 2024-03-04 PROCEDURE — 99284 EMERGENCY DEPT VISIT MOD MDM: CPT

## 2024-03-04 PROCEDURE — 70486 CT MAXILLOFACIAL W/O DYE: CPT

## 2024-03-04 PROCEDURE — 70450 CT HEAD/BRAIN W/O DYE: CPT

## 2024-03-04 PROCEDURE — 12013 RPR F/E/E/N/L/M 2.6-5.0 CM: CPT | Performed by: EMERGENCY MEDICINE

## 2024-03-04 PROCEDURE — 99284 EMERGENCY DEPT VISIT MOD MDM: CPT | Performed by: EMERGENCY MEDICINE

## 2024-03-04 RX ORDER — IBUPROFEN 600 MG/1
600 TABLET ORAL ONCE
Status: DISCONTINUED | OUTPATIENT
Start: 2024-03-04 | End: 2024-03-05 | Stop reason: HOSPADM

## 2024-03-04 RX ORDER — LIDOCAINE HYDROCHLORIDE AND EPINEPHRINE 10; 10 MG/ML; UG/ML
20 INJECTION, SOLUTION INFILTRATION; PERINEURAL ONCE
Status: COMPLETED | OUTPATIENT
Start: 2024-03-04 | End: 2024-03-04

## 2024-03-04 RX ORDER — ACETAMINOPHEN 325 MG/1
975 TABLET ORAL ONCE
Status: DISCONTINUED | OUTPATIENT
Start: 2024-03-04 | End: 2024-03-05 | Stop reason: HOSPADM

## 2024-03-04 RX ADMIN — LIDOCAINE HYDROCHLORIDE,EPINEPHRINE BITARTRATE 20 ML: 10; .01 INJECTION, SOLUTION INFILTRATION; PERINEURAL at 23:22

## 2024-03-04 NOTE — Clinical Note
Nigel Diallo was seen and treated in our emergency department on 3/4/2024.    No restrictions            Diagnosis:     Nigel  may return to work on return date.    He may return on this date: 03/06/2024         If you have any questions or concerns, please don't hesitate to call.      David Wu MD    ______________________________           _______________          _______________  Hospital Representative                              Date                                Time

## 2024-03-05 NOTE — ED PROVIDER NOTES
History  Chief Complaint   Patient presents with    Eye Laceration     Presents with left eyebrow laceration and left eye pain.       HPI    Prior to Admission Medications   Prescriptions Last Dose Informant Patient Reported? Taking?   ALPRAZolam (XANAX) 2 MG tablet   No No   Sig: TAKE 2 TABLETS TWICE A DAY AND 1 TABLET AT BEDTIME      Facility-Administered Medications: None       Past Medical History:   Diagnosis Date    Abnormal NCS (nerve conduction studies)     Anxiety     Chronic pain disorder     Intervertebral disc disorder with radiculopathy of lumbosacral region 2019       Past Surgical History:   Procedure Laterality Date    ARTHROSCOPIC REPAIR ACL Right     KNEE ARTHROSCOPY Right     ACL    OTHER SURGICAL HISTORY      trial stimulator    [place] PRQ IMPLTJ NSTIM ELECTRODE ARRAY EPIDURAL Right 2019    Procedure: PLACEMENT DORSAL ROOT GANGLION (DRG) ELECTRODE RIGHT L5, S1 W IMPLANTABLE PULSE GENERATOR, RIGHT;  Surgeon: Guzman Ramesh MD;  Location: [facility];  Service: Neurosurgery    ROTATOR CUFF REPAIR Right        Family History   Problem Relation Age of Onset    No Known Problems Mother     No Known Problems Father      I have reviewed and agree with the history as documented.    E-Cigarette/Vaping    E-Cigarette Use Never User      E-Cigarette/Vaping Substances    Nicotine No     THC Yes     CBD No     Flavoring No     Other No     Unknown No      Social History     Tobacco Use    Smoking status: Former     Current packs/day: 0.00     Types: Cigarettes     Start date:      Quit date: 2019     Years since quittin.1    Smokeless tobacco: Current    Tobacco comments:     encourged smoking cessation   Vaping Use    Vaping status: Never Used   Substance Use Topics    Alcohol use: Not Currently    Drug use: No       Review of Systems    Physical Exam  Physical Exam    Vital Signs  ED Triage Vitals [24 2119]   Temperature Pulse Respirations Blood Pressure SpO2   97.6 °F (36.4 °C) 86 18  147/75 99 %      Temp Source Heart Rate Source Patient Position - Orthostatic VS BP Location FiO2 (%)   Oral Monitor Sitting Right arm --      Pain Score       --           Vitals:    03/04/24 2119   BP: 147/75   Pulse: 86   Patient Position - Orthostatic VS: Sitting         Visual Acuity      ED Medications  Medications - No data to display    Diagnostic Studies  Results Reviewed       None                   No orders to display              Procedures  Procedures         ED Course                                             MDM         Disposition  Final diagnoses:   None     ED Disposition       None          Follow-up Information    None         Patient's Medications   Discharge Prescriptions    No medications on file       No discharge procedures on file.    PDMP Review       None            ED Provider  Electronically Signed by

## 2024-03-05 NOTE — DISCHARGE INSTRUCTIONS
WASHING: Keep the area dry for the next 24 hours after. After that, you can wash the area as normal with soap and water. Do not scrubs vigorously at the area. You can take a shower and let the water run over the site.     Topical Ointment: You can use any topical antibiotic ointment on the area to aid in healing. You can also use petroleum jelly/Vaseline or Aquaphor if that is easier. The goal is to keep the area slightly moist. Apply the ointment twice per day until the sutures are removed or absorbed.    SUTURE REMOVAL: You will need to have your sutures removed by a healthcare professional. That can be either your primary care physician or an urgent care. Sutures can be removed in 7 days (eyelid, high tension) if you feel that are still present and need to have them removed. There are 5 sutures.    SUN EXPOSURE: It is very important that you keep the area out of direct sunlight. For the first two weeks, or until the area is completely healed, you should not allow the area to be exposed to any sunlight and you should keep the area covered. For the next 6 months, you should be very careful to keep the area covered with sunblock and reapply it every hour while outside.

## 2024-03-05 NOTE — ED PROVIDER NOTES
"History  Chief Complaint   Patient presents with    Eye Laceration     Presents with left eyebrow laceration and left eye pain.       Nigel is a 33-year-old male who presents to the emergency department after a reported fall at home without loss of consciousness.  Describes a mechanical fall in which she had hit his head off of the ground resulting in a laceration over the left eyebrow as well as soft tissue swelling over the left cheek.  Denies any loss of consciousness, numbness, paresthesias, or loss of vision.    Hemostatically controlled with regards to areas of bleeding at this time.  He states that he has never had a suture repair or needed sutures for laceration before in the past so he does endorse that this is \"all new to me\".  No focal neurological deficits appreciated on examination.  Came to the emergency department after the cut indicating that he wanted to be evaluated and see if there was any need for suturing.    Endorses significant amount of tenderness over his left face and wanted to know if \"I broke my face\".      History provided by:  Patient   used: No        Prior to Admission Medications   Prescriptions Last Dose Informant Patient Reported? Taking?   ALPRAZolam (XANAX) 2 MG tablet   No No   Sig: TAKE 2 TABLETS TWICE A DAY AND 1 TABLET AT BEDTIME      Facility-Administered Medications: None       Past Medical History:   Diagnosis Date    Abnormal NCS (nerve conduction studies)     Anxiety     Chronic pain disorder     Intervertebral disc disorder with radiculopathy of lumbosacral region 4/24/2019       Past Surgical History:   Procedure Laterality Date    ARTHROSCOPIC REPAIR ACL Right     KNEE ARTHROSCOPY Right     ACL    OTHER SURGICAL HISTORY      trial stimulator    [place] PRQ IMPLTJ NSTIM ELECTRODE ARRAY EPIDURAL Right 7/12/2019    Procedure: PLACEMENT DORSAL ROOT GANGLION (DRG) ELECTRODE RIGHT L5, S1 W IMPLANTABLE PULSE GENERATOR, RIGHT;  Surgeon: Guzman Ramesh MD;  " Location:  MAIN OR;  Service: Neurosurgery    ROTATOR CUFF REPAIR Right        Family History   Problem Relation Age of Onset    No Known Problems Mother     No Known Problems Father      I have reviewed and agree with the history as documented.    E-Cigarette/Vaping    E-Cigarette Use Never User      E-Cigarette/Vaping Substances    Nicotine No     THC Yes     CBD No     Flavoring No     Other No     Unknown No      Social History     Tobacco Use    Smoking status: Former     Current packs/day: 0.00     Types: Cigarettes     Start date:      Quit date: 2019     Years since quittin.1    Smokeless tobacco: Current    Tobacco comments:     encourged smoking cessation   Vaping Use    Vaping status: Never Used   Substance Use Topics    Alcohol use: Not Currently    Drug use: No        Review of Systems   Constitutional:  Negative for activity change, appetite change, chills and fever.   HENT:  Negative for congestion, ear pain and sore throat.    Eyes:  Negative for pain, discharge, itching and visual disturbance.   Respiratory:  Negative for apnea, cough, chest tightness and shortness of breath.    Cardiovascular:  Negative for chest pain and palpitations.   Gastrointestinal:  Negative for abdominal distention, abdominal pain, diarrhea, nausea, rectal pain and vomiting.   Endocrine: Negative for cold intolerance.   Genitourinary:  Negative for difficulty urinating, dysuria, enuresis and hematuria.   Musculoskeletal:  Negative for arthralgias and back pain.   Skin:  Negative for color change and rash.   Neurological:  Negative for dizziness, seizures, syncope, facial asymmetry and headaches.   Hematological:  Negative for adenopathy.   Psychiatric/Behavioral:  Negative for agitation.    All other systems reviewed and are negative.      Physical Exam  ED Triage Vitals [249]   Temperature Pulse Respirations Blood Pressure SpO2   97.6 °F (36.4 °C) 86 18 147/75 99 %      Temp Source Heart Rate Source  Patient Position - Orthostatic VS BP Location FiO2 (%)   Oral Monitor Sitting Right arm --      Pain Score       --             Orthostatic Vital Signs  Vitals:    03/04/24 2119   BP: 147/75   Pulse: 86   Patient Position - Orthostatic VS: Sitting       Physical Exam  Vitals and nursing note reviewed.   Constitutional:       Appearance: Normal appearance. He is well-developed. He is obese. He is not ill-appearing or diaphoretic.   HENT:      Head: Normocephalic.      Comments: 3 cm linear laceration noted in the left eyebrow.     Right Ear: External ear normal.      Left Ear: External ear normal.      Nose: Nose normal. No congestion.      Mouth/Throat:      Mouth: Mucous membranes are moist.      Pharynx: No oropharyngeal exudate or posterior oropharyngeal erythema.   Eyes:      General:         Right eye: No discharge.         Left eye: No discharge.      Extraocular Movements: Extraocular movements intact.      Conjunctiva/sclera: Conjunctivae normal.      Pupils: Pupils are equal, round, and reactive to light.   Cardiovascular:      Rate and Rhythm: Normal rate and regular rhythm.      Pulses: Normal pulses.      Heart sounds: No murmur heard.  Pulmonary:      Effort: Pulmonary effort is normal. No respiratory distress.      Breath sounds: Normal breath sounds. No wheezing or rhonchi.   Abdominal:      Palpations: Abdomen is soft.      Tenderness: There is no abdominal tenderness. There is no guarding or rebound.   Musculoskeletal:         General: No swelling, deformity or signs of injury. Normal range of motion.      Cervical back: Normal range of motion and neck supple. No rigidity.   Skin:     General: Skin is warm and dry.      Capillary Refill: Capillary refill takes less than 2 seconds.   Neurological:      General: No focal deficit present.      Mental Status: He is alert and oriented to person, place, and time. Mental status is at baseline.      Cranial Nerves: No cranial nerve deficit.      Motor: No  weakness.   Psychiatric:         Mood and Affect: Mood normal.         ED Medications  Medications   tetanus-diphtheria-acellular pertussis (BOOSTRIX) IM injection 0.5 mL (0.5 mL Intramuscular Not Given 3/4/24 2222)   ibuprofen (MOTRIN) tablet 600 mg (600 mg Oral Not Given 3/4/24 2324)   acetaminophen (TYLENOL) tablet 975 mg (975 mg Oral Not Given 3/4/24 2324)   lidocaine-epinephrine (XYLOCAINE/EPINEPHRINE) 1 %-1:100,000 injection 20 mL (20 mL Infiltration Given by Other 3/4/24 2322)       Diagnostic Studies  Results Reviewed       None                   CT facial bones without contrast   ED Interpretation by David Wu MD (03/05 0023)   FINDINGS:     FACIAL BONES:   Intact nasal bones. Chronic left nasal septal deviation and spur.     No orbital, zygomatic or maxillary fracture.     Normal alignment of the temporomandibular joints. No mandible fracture.     ORBITS: Intact globes. No retro-orbital inflammation.     SINUSES: Clear.     SOFT TISSUES: Subcutaneous fat stranding overlying the left maxilla and zygoma suggesting a contusion.     IMPRESSION:        1. No evidence of acute maxillofacial fracture.  2. Subcutaneous contusion over the left maxilla and zygoma..           Final Result by Jesse Ling MD (03/05 0018)         1. No evidence of acute maxillofacial fracture.   2. Subcutaneous contusion over the left maxilla and zygoma..               Workstation performed: [id number]         CT head without contrast   ED Interpretation by David Wu MD (03/05 0023)   FINDINGS:     PARENCHYMA: No acute intracranial hemorrhage or mass effect.     VENTRICLES AND EXTRA-AXIAL SPACES: No hydrocephalus or extra-axial collection.     VISUALIZED ORBITS: Intact.     PARANASAL SINUSES: Clear.     CALVARIUM AND EXTRACRANIAL SOFT TISSUES: No acute calvarial fracture.     IMPRESSION:     No acute intracranial abnormality.                 Workstation performed: [id number]        Final  "Result by Jesse Ling MD (03/05 0016)      No acute intracranial abnormality.                  Workstation performed: [id number]               Procedures  Universal Protocol:  Procedure performed by:  Consent: Verbal consent obtained.  Risks and benefits: risks, benefits and alternatives were discussed  Consent given by: patient and spouse  Required items: required blood products, implants, devices, and special equipment available  Patient identity confirmed: verbally with patient, arm band and hospital-assigned identification number  Laceration repair    Date/Time: 3/4/2024 11:20 PM    Performed by: David Wu MD  Authorized by: David Wu MD  Body area: head/neck  Location details: left eyebrow  Laceration length: 3 cm  Contamination: The wound is contaminated.  Foreign bodies: no foreign bodies  Tendon involvement: none  Nerve involvement: none  Vascular damage: no  Anesthesia: local infiltration    Anesthesia:  Local Anesthetic: lidocaine 1% with epinephrine  Anesthetic total: 6 mL    Wound Dehiscence:  Superficial Wound Dehiscence: simple closure      Procedure Details:  Preparation: Patient was prepped and draped in the usual sterile fashion.  Irrigation solution: saline  Irrigation method: syringe  Amount of cleaning: standard  Debridement: none  Degree of undermining: none  Wound skin closure material used: 5-0 Chromic Gut.  Number of sutures: 5  Approximation: close  Approximation difficulty: simple  Patient tolerance: patient tolerated the procedure well with no immediate complications            ED Course  ED Course as of 03/05/24 0111   Mon Mar 04, 2024   2203 Fall with head strike resulting in 2-3 cm laceration over the left eyebrow.  Extraocular movements preserved.  No sensory deficits appreciated.  Will proceed with laceration repair.   2203 Patient states that it has been \"a very long time\" since his last tetanus shot.  Patient does not know when his last " "tetanus shot was.  When on review of electronic medical record, it do not see any previously documented tetanus shot.  Will update tetanus today given laceration to the skin.   Tue Mar 05, 2024   0023 CT facial bones without contrast  FINDINGS:     PARENCHYMA: No acute intracranial hemorrhage or mass effect.     VENTRICLES AND EXTRA-AXIAL SPACES: No hydrocephalus or extra-axial collection.     VISUALIZED ORBITS: Intact.     PARANASAL SINUSES: Clear.     CALVARIUM AND EXTRACRANIAL SOFT TISSUES: No acute calvarial fracture.     IMPRESSION:     No acute intracranial abnormality.                 Workstation performed: [id number]     0023 CT head without contrast  FINDINGS:      PARENCHYMA: No acute intracranial hemorrhage or mass effect.      VENTRICLES AND EXTRA-AXIAL SPACES: No hydrocephalus or extra-axial collection.      VISUALIZED ORBITS: Intact.      PARANASAL SINUSES: Clear.      CALVARIUM AND EXTRACRANIAL SOFT TISSUES: No acute calvarial fracture.      IMPRESSION:      No acute intracranial abnormality.                                          Medical Decision Making  Nigel is a 33-year-old male presents to the emergency department after mechanical fall resulting in left eyebrow laceration.    DDx including but not limited to: laceration, deep structure involvement, foreign body, fracture, wound infection.     Based on initial presenting complaint, I feel that is most concerning for laceration.  No deep structure involvement.  No foreign body appreciated.  Lower suspicion for fracture and wound infection.  Laceration repaired as indicated in the procedure section of this note.    Based on the patient's expressed concern for \"broken bones\", CT imaging of the head as well as facial bones was done with no acute traumatic process noted.  Feel patient's pain is related to the soft tissue swelling that the patient had expressed over his left cheek.    Medications were offered to the patient as well as tetanus " vaccination secondary to wound as well as patient expressing that he did not know the last time he had got a vaccine and no previously documented vaccine that I was able to see in the chart.  Patient deferred these medications.  After discussion by nursing staff with the patient, patient was still resistant to receiving these medications.  I did not feel that it was necessary at this time for administering medications against the patient as well.    Based of unremarkable physical examination, imaging studies, and appropriate laceration repair, patient was deemed stable for discharge home and encouraged to follow-up with primary care provider as well as using ice to the areas of swelling as well as utilization of anti-inflammatory medication for pain control.  Strict return precautions as well as red flag symptoms that would warrant continued evaluation in the emergency department were discussed at length at bedside.  Patient and spouse are in agreement with this plan.    Amount and/or Complexity of Data Reviewed  Radiology: ordered. Decision-making details documented in ED Course.     Details: Imaging grossly unremarkable for any acute traumatic process.    Risk  OTC drugs.  Prescription drug management.          Disposition  Final diagnoses:   Eyebrow laceration, left, initial encounter   Closed head injury, initial encounter   Concussion   Contusion of soft tissue - Cheek     Time reflects when diagnosis was documented in both MDM as applicable and the Disposition within this note       Time User Action Codes Description Comment    3/4/2024 11:10 PM David Wu [S01.112A] Eyebrow laceration, left, initial encounter     3/4/2024 11:10 PM David Wu [S09.90XA] Closed head injury, initial encounter     3/4/2024 11:10 PM David Wu [S06.0XAA] Concussion     3/5/2024 12:24 AM David Wu [T14.8XXA] Contusion of soft tissue     3/5/2024 12:24 AM David Wu  Modify [T14.8XXA] Contusion of soft tissue Cheek          ED Disposition       ED Disposition   Discharge    Condition   Stable    Date/Time   Tue Mar 5, 2024 12:24 AM    Comment   Nigel Arben Imani discharge to home/self care.                   Follow-up Information       Follow up With Specialties Details Why Contact Info    Peng Mendez MD Family Medicine Schedule an appointment as soon as possible for a visit  As needed [address] [place] [id number]  [phone]              Discharge Medication List as of 3/5/2024 12:24 AM        CONTINUE these medications which have NOT CHANGED    Details   ALPRAZolam (XANAX) 2 MG tablet TAKE 2 TABLETS TWICE A DAY AND 1 TABLET AT BEDTIME, Normal           No discharge procedures on file.    PDMP Review       None             ED Provider  Attending physically available and evaluated Nigel Diallo. I managed the patient along with the ED Attending.    Electronically Signed by           David Wu MD  03/05/24 0111

## 2024-05-13 ENCOUNTER — OFFICE VISIT (OUTPATIENT)
Dept: FAMILY MEDICINE CLINIC | Facility: CLINIC | Age: 34
End: 2024-05-13
Payer: MEDICARE

## 2024-05-13 VITALS
HEART RATE: 78 BPM | OXYGEN SATURATION: 96 % | DIASTOLIC BLOOD PRESSURE: 86 MMHG | BODY MASS INDEX: 26.64 KG/M2 | SYSTOLIC BLOOD PRESSURE: 136 MMHG | WEIGHT: 201 LBS | HEIGHT: 73 IN

## 2024-05-13 DIAGNOSIS — F41.9 ANXIETY: ICD-10-CM

## 2024-05-13 DIAGNOSIS — G47.00 INSOMNIA, UNSPECIFIED TYPE: ICD-10-CM

## 2024-05-13 DIAGNOSIS — G90.521 COMPLEX REGIONAL PAIN SYNDROME TYPE 1 OF RIGHT LOWER EXTREMITY: Primary | ICD-10-CM

## 2024-05-13 PROCEDURE — G2211 COMPLEX E/M VISIT ADD ON: HCPCS | Performed by: FAMILY MEDICINE

## 2024-05-13 PROCEDURE — 99213 OFFICE O/P EST LOW 20 MIN: CPT | Performed by: FAMILY MEDICINE

## 2024-05-13 RX ORDER — ALPRAZOLAM 2 MG/1
TABLET ORAL
Qty: 150 TABLET | Refills: 3 | Status: SHIPPED | OUTPATIENT
Start: 2024-05-13 | End: 2024-05-13 | Stop reason: SDUPTHER

## 2024-05-13 RX ORDER — ALPRAZOLAM 2 MG/1
TABLET ORAL
Qty: 150 TABLET | Refills: 4 | Status: SHIPPED | OUTPATIENT
Start: 2024-05-13

## 2024-05-13 NOTE — PROGRESS NOTES
"Name: Nigel Diallo      : 1990      MRN: [id number]  Encounter Provider: Peng Mendez MD  Encounter Date: 2024   Encounter department: [place] [id number] [address]    Assessment & Plan     1. Complex regional pain syndrome type 1 of right lower extremity    2. Anxiety  -     ALPRAZolam (XANAX) 2 MG tablet; TAKE 2 TABLETS TWICE A DAY AND 1 TABLET AT BEDTIME    3. Insomnia, unspecified type        Depression Screening and Follow-up Plan: Patient was screened for depression during today's encounter. They screened negative with a PHQ-2 score of 0.    Tobacco Cessation Counseling: Tobacco cessation counseling was provided. The patient is sincerely urged to quit consumption of tobacco. He is not ready to quit tobacco.         Subjective      Patient comes in for checkup.  He continues to be disabled by complex regional pain syndrome right lower extremity.  He takes Xanax for anxiety and to help him sleep at night.  He continues to follow with neurology.      Review of Systems   Constitutional: Negative.    Respiratory: Negative.     Cardiovascular: Negative.        Current Outpatient Medications on File Prior to Visit   Medication Sig    [DISCONTINUED] ALPRAZolam (XANAX) 2 MG tablet TAKE 2 TABLETS TWICE A DAY AND 1 TABLET AT BEDTIME       Objective     /86 (BP Location: Left arm, Patient Position: Sitting, Cuff Size: Standard)   Pulse 78   Ht 6' 1\" (1.854 m)   Wt 91.2 kg (201 lb)   SpO2 96%   BMI 26.52 kg/m²     Physical Exam  Constitutional:       Appearance: Normal appearance. He is well-developed.   HENT:      Head: Normocephalic and atraumatic.   Eyes:      Pupils: Pupils are equal, round, and reactive to light.   Cardiovascular:      Rate and Rhythm: Normal rate and regular rhythm.      Heart sounds: Normal heart sounds.   Pulmonary:      Effort: Pulmonary effort is normal.      Breath sounds: Normal breath sounds.   Musculoskeletal:         General: " Normal range of motion.      Cervical back: Neck supple.   Skin:     General: Skin is warm and dry.   Neurological:      Mental Status: He is alert.   Psychiatric:         Mood and Affect: Mood normal.         Behavior: Behavior normal.       Peng Mendez MD

## 2024-09-11 ENCOUNTER — OFFICE VISIT (OUTPATIENT)
Dept: FAMILY MEDICINE CLINIC | Facility: CLINIC | Age: 34
End: 2024-09-11
Payer: MEDICARE

## 2024-09-11 VITALS
OXYGEN SATURATION: 99 % | HEART RATE: 77 BPM | WEIGHT: 198 LBS | HEIGHT: 73 IN | SYSTOLIC BLOOD PRESSURE: 130 MMHG | DIASTOLIC BLOOD PRESSURE: 82 MMHG | BODY MASS INDEX: 26.24 KG/M2 | TEMPERATURE: 98.6 F

## 2024-09-11 DIAGNOSIS — G90.521 COMPLEX REGIONAL PAIN SYNDROME TYPE 1 OF RIGHT LOWER EXTREMITY: ICD-10-CM

## 2024-09-11 DIAGNOSIS — F41.9 ANXIETY: ICD-10-CM

## 2024-09-11 DIAGNOSIS — G47.00 INSOMNIA, UNSPECIFIED TYPE: ICD-10-CM

## 2024-09-11 DIAGNOSIS — E78.5 DYSLIPIDEMIA: ICD-10-CM

## 2024-09-11 DIAGNOSIS — Z11.59 NEED FOR HEPATITIS C SCREENING TEST: ICD-10-CM

## 2024-09-11 DIAGNOSIS — Z00.00 MEDICARE ANNUAL WELLNESS VISIT, SUBSEQUENT: Primary | ICD-10-CM

## 2024-09-11 PROCEDURE — G0439 PPPS, SUBSEQ VISIT: HCPCS | Performed by: FAMILY MEDICINE

## 2024-09-11 PROCEDURE — 99214 OFFICE O/P EST MOD 30 MIN: CPT | Performed by: FAMILY MEDICINE

## 2024-09-11 RX ORDER — ALPRAZOLAM 2 MG
TABLET ORAL
Qty: 150 TABLET | Refills: 5 | Status: SHIPPED | OUTPATIENT
Start: 2024-09-11

## 2024-09-11 NOTE — PROGRESS NOTES
Ambulatory Visit  Name: Nigel Diallo      : 1990      MRN: [id number]  Encounter Provider: Peng Mendez MD  Encounter Date: 2024   Encounter department: [address]    Assessment & Plan  Medicare annual wellness visit, subsequent         Complex regional pain syndrome type 1 of right lower extremity         Anxiety    Orders:    ALPRAZolam (XANAX) 2 MG tablet; TAKE 2 TABLETS TWICE A DAY AND 1 TABLET AT BEDTIME    Insomnia, unspecified type  Continue Xanax       Dyslipidemia  Low-carb diet  Orders:    CBC and differential; Future    Comprehensive metabolic panel; Future    Lipid panel; Future    Need for hepatitis C screening test    Orders:    Hepatitis C antibody; Future      Depression Screening and Follow-up Plan: Patient was screened for depression during today's encounter. They screened negative with a PHQ-2 score of 0.      Preventive health issues were discussed with patient, and age appropriate screening tests were ordered as noted in patient's After Visit Summary. Personalized health advice and appropriate referrals for health education or preventive services given if needed, as noted in patient's After Visit Summary.    History of Present Illness     Patient comes in for checkup.  He has chronic right leg pain.  He takes nothing for his pain but take Xanax to reduce his anxiety and to help him sleep at night.       Patient Care Team:  Peng Mendez MD as PCP - General (Family Medicine)  Peng Mendez MD as PCP - PCP-[facility] ([facility], [place])  Som Lopez MD (Pain Medicine)    Review of Systems   Constitutional: Negative.    Respiratory: Negative.     Cardiovascular: Negative.      Medical History Reviewed by provider this encounter:  Tobacco  Allergies  Meds  Problems  Med Hx  Surg Hx  Fam Hx       Annual Wellness Visit Questionnaire   Nigel is here for his Subsequent Wellness visit. Last Medicare Wellness visit information  reviewed, patient interviewed and updates made to the record today.      Health Risk Assessment:   Patient rates overall health as good. Patient feels that their physical health rating is same. Patient is satisfied with their life. Eyesight was rated as same. Hearing was rated as same. Patient feels that their emotional and mental health rating is same. Patients states they are sometimes angry. Patient states they are often unusually tired/fatigued. Pain experienced in the last 7 days has been some. Patient's pain rating has been 8/10. Patient states that he has experienced no weight loss or gain in last 6 months.     Depression Screening:   PHQ-2 Score: 0      Fall Risk Screening:   In the past year, patient has experienced: no history of falling in past year      Home Safety:  Patient does not have trouble with stairs inside or outside of their home. Patient has working smoke alarms and has working carbon monoxide detector. Home safety hazards include: none.     Nutrition:   Current diet is Regular.     Medications:   Patient is not currently taking any over-the-counter supplements. Patient is able to manage medications.     Activities of Daily Living (ADLs)/Instrumental Activities of Daily Living (IADLs):   Walk and transfer into and out of bed and chair?: Yes  Dress and groom yourself?: Yes    Bathe or shower yourself?: Yes    Feed yourself? Yes  Do your laundry/housekeeping?: No  Manage your money, pay your bills and track your expenses?: Yes  Make your own meals?: Yes    Do your own shopping?: Yes    Previous Hospitalizations:   Any hospitalizations or ED visits within the last 12 months?: No      Advance Care Planning:   Living will: No    Durable POA for healthcare: No    Advanced directive: No    Advanced directive counseling given: Yes    ACP document given: Yes    End of Life Decisions reviewed with patient: Yes    Provider agrees with end of life decisions: Yes      PREVENTIVE SCREENINGS       Cardiovascular Screening:    General: Risks and Benefits Discussed    Due for: Lipid Panel      Diabetes Screening:     General: Risks and Benefits Discussed    Due for: Blood Glucose      Colorectal Cancer Screening:     General: Screening Not Indicated      Prostate Cancer Screening:    General: Screening Not Indicated      Osteoporosis Screening:    General: Screening Not Indicated      Abdominal Aortic Aneurysm (AAA) Screening:    Risk factors include: tobacco use        Lung Cancer Screening:     General: Screening Not Indicated      Hepatitis C Screening:    General: Risks and Benefits Discussed    Hep C Screening Accepted: Yes      Screening, Brief Intervention, and Referral to Treatment (SBIRT)    Screening  Typical number of drinks in a day: 0  Typical number of drinks in a week: 0  Interpretation: Low risk drinking behavior.    AUDIT-C Screenin) How often did you have a drink containing alcohol in the past year? never  2) How many drinks did you have on a typical day when you were drinking in the past year? 0  3) How often did you have 6 or more drinks on one occasion in the past year? never    AUDIT-C Score: 0  Interpretation: Score 0-3 (male): Negative screen for alcohol misuse    Single Item Drug Screening:  How often have you used an illegal drug (including marijuana) or a prescription medication for non-medical reasons in the past year? never    Single Item Drug Screen Score: 0  Interpretation: Negative screen for possible drug use disorder    Social Determinants of Health     Financial Resource Strain: Low Risk  (2023)    Overall Financial Resource Strain (CARDIA)     Difficulty of Paying Living Expenses: Not very hard   Food Insecurity: No Food Insecurity (2024)    Hunger Vital Sign     Worried About Running Out of Food in the Last Year: Never true     Ran Out of Food in the Last Year: Never true   Transportation Needs: No Transportation Needs (2024)    PRAPARE - Transportation      "Lack of Transportation (Medical): No     Lack of Transportation (Non-Medical): No   Housing Stability: Low Risk  (9/4/2024)    Housing Stability Vital Sign     Unable to Pay for Housing in the Last Year: No     Number of Times Moved in the Last Year: 0     Homeless in the Last Year: No   Utilities: Not At Risk (9/4/2024)    [facility] Utilities     Threatened with loss of utilities: No     No results found.    Objective     /82   Pulse 77   Temp 98.6 °F (37 °C)   Ht 6' 1\" (1.854 m)   Wt 89.8 kg (198 lb)   SpO2 99%   BMI 26.12 kg/m²     Physical Exam  Constitutional:       Appearance: Normal appearance. He is well-developed.   HENT:      Head: Normocephalic and atraumatic.   Eyes:      Pupils: Pupils are equal, round, and reactive to light.   Cardiovascular:      Rate and Rhythm: Normal rate and regular rhythm.      Heart sounds: Normal heart sounds.   Pulmonary:      Effort: Pulmonary effort is normal.      Breath sounds: Normal breath sounds.   Musculoskeletal:         General: Normal range of motion.      Cervical back: Neck supple.   Skin:     General: Skin is warm and dry.   Neurological:      Mental Status: He is alert.   Psychiatric:         Mood and Affect: Mood normal.         Behavior: Behavior normal.         "

## 2024-09-11 NOTE — ASSESSMENT & PLAN NOTE
Low-carb diet  Orders:    CBC and differential; Future    Comprehensive metabolic panel; Future    Lipid panel; Future

## 2024-09-11 NOTE — PATIENT INSTRUCTIONS
Medicare Preventive Visit Patient Instructions  Thank you for completing your Welcome to Medicare Visit or Medicare Annual Wellness Visit today. Your next wellness visit will be due in one year (9/12/2025).  The screening/preventive services that you may require over the next 5-10 years are detailed below. Some tests may not apply to you based off risk factors and/or age. Screening tests ordered at today's visit but not completed yet may show as past due. Also, please note that scanned in results may not display below.  Preventive Screenings:  Service Recommendations Previous Testing/Comments   Colorectal Cancer Screening  Colonoscopy    Fecal Occult Blood Test (FOBT)/Fecal Immunochemical Test (FIT)  Fecal DNA/Cologuard Test  Flexible Sigmoidoscopy Age: 45-75 years old   Colonoscopy: every 10 years (May be performed more frequently if at higher risk)  OR  FOBT/FIT: every 1 year  OR  Cologuard: every 3 years  OR  Sigmoidoscopy: every 5 years  Screening may be recommended earlier than age 45 if at higher risk for colorectal cancer. Also, an individualized decision between you and your healthcare provider will decide whether screening between the ages of 76-85 would be appropriate. Colonoscopy: Not on file  FOBT/FIT: Not on file  Cologuard: Not on file  Sigmoidoscopy: Not on file          Prostate Cancer Screening Individualized decision between patient and health care provider in men between ages of 55-69   Medicare will cover every 12 months beginning on the day after your 50th birthday PSA: No results in last 5 years     Screening Not Indicated     Hepatitis C Screening Once for adults born between 1945 and 1965  More frequently in patients at high risk for Hepatitis C Hep C Antibody: Not on file        Diabetes Screening 1-2 times per year if you're at risk for diabetes or have pre-diabetes Fasting glucose: 89 mg/dL (7/29/2020)  A1C: No results in last 5 years (No results in last 5 years)      Cholesterol Screening  Once every 5 years if you don't have a lipid disorder. May order more often based on risk factors. Lipid panel: Not on file         Other Preventive Screenings Covered by Medicare:  Abdominal Aortic Aneurysm (AAA) Screening: covered once if your at risk. You're considered to be at risk if you have a family history of AAA or a male between the age of 65-75 who smoking at least 100 cigarettes in your lifetime.  Lung Cancer Screening: covers low dose CT scan once per year if you meet all of the following conditions: (1) Age 55-77; (2) No signs or symptoms of lung cancer; (3) Current smoker or have quit smoking within the last 15 years; (4) You have a tobacco smoking history of at least 20 pack years (packs per day x number of years you smoked); (5) You get a written order from a healthcare provider.  Glaucoma Screening: covered annually if you're considered high risk: (1) You have diabetes OR (2) Family history of glaucoma OR (3)  aged 50 and older OR (4)  American aged 65 and older  Osteoporosis Screening: covered every 2 years if you meet one of the following conditions: (1) Have a vertebral abnormality; (2) On glucocorticoid therapy for more than 3 months; (3) Have primary hyperparathyroidism; (4) On osteoporosis medications and need to assess response to drug therapy.  HIV Screening: covered annually if you're between the age of 15-65. Also covered annually if you are younger than 15 and older than 65 with risk factors for HIV infection. For pregnant patients, it is covered up to 3 times per pregnancy.    Immunizations:  Immunization Recommendations   Influenza Vaccine Annual influenza vaccination during flu season is recommended for all persons aged >= 6 months who do not have contraindications   Pneumococcal Vaccine   * Pneumococcal conjugate vaccine = PCV13 (Prevnar 13), PCV15 (Vaxneuvance), PCV20 (Prevnar 20)  * Pneumococcal polysaccharide vaccine = PPSV23 (Pneumovax) Adults 19-63 yo  with certain risk factors or if 65+ yo  If never received any pneumonia vaccine: recommend Prevnar 20 (PCV20)  Give PCV20 if previously received 1 dose of PCV13 or PPSV23   Hepatitis B Vaccine 3 dose series if at intermediate or high risk (ex: diabetes, end stage renal disease, liver disease)   Respiratory syncytial virus (RSV) Vaccine - COVERED BY MEDICARE PART D  * RSVPreF3 (Arexvy) CDC recommends that adults 60 years of age and older may receive a single dose of RSV vaccine using shared clinical decision-making (SCDM)   Tetanus (Td) Vaccine - COST NOT COVERED BY MEDICARE PART B Following completion of primary series, a booster dose should be given every 10 years to maintain immunity against tetanus. Td may also be given as tetanus wound prophylaxis.   Tdap Vaccine - COST NOT COVERED BY MEDICARE PART B Recommended at least once for all adults. For pregnant patients, recommended with each pregnancy.   Shingles Vaccine (Shingrix) - COST NOT COVERED BY MEDICARE PART B  2 shot series recommended in those 19 years and older who have or will have weakened immune systems or those 50 years and older     Health Maintenance Due:      Topic Date Due   • Hepatitis C Screening  Never done   • HIV Screening  Discontinued     Immunizations Due:      Topic Date Due   • COVID-19 Vaccine (1 - 2023-24 season) Never done   • Influenza Vaccine (1) 09/01/2024     Advance Directives   What are advance directives?  Advance directives are legal documents that state your wishes and plans for medical care. These plans are made ahead of time in case you lose your ability to make decisions for yourself. Advance directives can apply to any medical decision, such as the treatments you want, and if you want to donate organs.   What are the types of advance directives?  There are many types of advance directives, and each state has rules about how to use them. You may choose a combination of any of the following:  Living will:  This is a written  record of the treatment you want. You can also choose which treatments you do not want, which to limit, and which to stop at a certain time. This includes surgery, medicine, IV fluid, and tube feedings.   Durable power of  for healthcare (DPAHC):  This is a written record that states who you want to make healthcare choices for you when you are unable to make them for yourself. This person, called a proxy, is usually a family member or a friend. You may choose more than 1 proxy.  Do not resuscitate (DNR) order:  A DNR order is used in case your heart stops beating or you stop breathing. It is a request not to have certain forms of treatment, such as CPR. A DNR order may be included in other types of advance directives.  Medical directive:  This covers the care that you want if you are in a coma, near death, or unable to make decisions for yourself. You can list the treatments you want for each condition. Treatment may include pain medicine, surgery, blood transfusions, dialysis, IV or tube feedings, and a ventilator (breathing machine).  Values history:  This document has questions about your views, beliefs, and how you feel and think about life. This information can help others choose the care that you would choose.  Why are advance directives important?  An advance directive helps you control your care. Although spoken wishes may be used, it is better to have your wishes written down. Spoken wishes can be misunderstood, or not followed. Treatments may be given even if you do not want them. An advance directive may make it easier for your family to make difficult choices about your care.   How to Quit Using Smokeless Tobacco   Why it is important to stop using smokeless tobacco:  Smokeless tobacco comes in many forms. Examples include chew, snuff, dip, dissolvable tobacco, and snus. All smokeless tobacco products contain nicotine and may contain as much nicotine as 3 cigarettes. You may be physically  dependent on nicotine. You may also be emotionally addicted to it. The cravings can be strong, but it is important to quit using smokeless tobacco. You will improve your health and decrease your cancer, stroke, and heart attack risk. Mouth sores and tooth problems will also improve when you quit. You can benefit from quitting no matter how long you have used smokeless tobacco.   Prepare to stop using smokeless tobacco:  Nicotine is a highly addictive drug. Withdrawal symptoms can happen when you stop and make it hard to quit. The following can help keep you on track:  Set a quit date.    Tell friends, family, and coworkers that you plan to quit.    Remove all smokeless tobacco products from your home, car, and workplace.    Manage weight gain after you quit:  Nicotine can affect your metabolism. You may gain a few pounds after you quit. The following can help you control your weight:  Eat healthy foods.    Drink water before, during, and between meals.    Exercise as directed.      Weight Management   Why it is important to manage your weight:  Being overweight increases your risk of health conditions such as heart disease, high blood pressure, type 2 diabetes, and certain types of cancer. It can also increase your risk for osteoarthritis, sleep apnea, and other respiratory problems. Aim for a slow, steady weight loss. Even a small amount of weight loss can lower your risk of health problems.  How to lose weight safely:  A safe and healthy way to lose weight is to eat fewer calories and get regular exercise. You can lose up about 1 pound a week by decreasing the number of calories you eat by 500 calories each day.   Healthy meal plan for weight management:  A healthy meal plan includes a variety of foods, contains fewer calories, and helps you stay healthy. A healthy meal plan includes the following:  Eat whole-grain foods more often.  A healthy meal plan should contain fiber. Fiber is the part of grains, fruits,  and vegetables that is not broken down by your body. Whole-grain foods are healthy and provide extra fiber in your diet. Some examples of whole-grain foods are whole-wheat breads and pastas, oatmeal, brown rice, and bulgur.  Eat a variety of vegetables every day.  Include dark, leafy greens such as spinach, kale, samara greens, and mustard greens. Eat yellow and orange vegetables such as carrots, sweet potatoes, and winter squash.   Eat a variety of fruits every day.  Choose fresh or canned fruit (canned in its own juice or light syrup) instead of juice. Fruit juice has very little or no fiber.  Eat low-fat dairy foods.  Drink fat-free (skim) milk or 1% milk. Eat fat-free yogurt and low-fat cottage cheese. Try low-fat cheeses such as mozzarella and other reduced-fat cheeses.  Choose meat and other protein foods that are low in fat.  Choose beans or other legumes such as split peas or lentils. Choose fish, skinless poultry (chicken or turkey), or lean cuts of red meat (beef or pork). Before you cook meat or poultry, cut off any visible fat.   Use less fat and oil.  Try baking foods instead of frying them. Add less fat, such as margarine, sour cream, regular salad dressing and mayonnaise to foods. Eat fewer high-fat foods. Some examples of high-fat foods include french fries, doughnuts, ice cream, and cakes.  Eat fewer sweets.  Limit foods and drinks that are high in sugar. This includes candy, cookies, regular soda, and sweetened drinks.  Exercise:  Exercise at least 30 minutes per day on most days of the week. Some examples of exercise include walking, biking, dancing, and swimming. You can also fit in more physical activity by taking the stairs instead of the elevator or parking farther away from stores. Ask your healthcare provider about the best exercise plan for you.      © Copyright [employer] 2018 Information is for End User's use only and may not be sold, redistributed or otherwise used for commercial  purposes. All illustrations and images included in CareNotes® are the copyrighted property of [email or web address] or [employer]

## 2024-09-17 ENCOUNTER — TELEPHONE (OUTPATIENT)
Age: 34
End: 2024-09-17

## 2024-09-17 NOTE — TELEPHONE ENCOUNTER
"Patient was just in the office and received paperwork for labs.  However, \"the dog ate them!\"   He would like copies mailed to him at his home.  Thank you.  "

## 2025-03-10 LAB
ALBUMIN SERPL-MCNC: 5.2 G/DL (ref 3.5–5.7)
ALP SERPL-CCNC: 35 U/L (ref 35–120)
ALT SERPL-CCNC: 28 U/L
ANION GAP SERPL CALCULATED.3IONS-SCNC: 10 MMOL/L (ref 3–11)
AST SERPL-CCNC: 32 U/L
BASOPHILS # BLD AUTO: 0 THOU/CMM (ref 0–0.1)
BASOPHILS NFR BLD AUTO: 1 %
BILIRUB SERPL-MCNC: 1.7 MG/DL (ref 0.2–1)
BUN SERPL-MCNC: 9 MG/DL (ref 7–28)
CALCIUM SERPL-MCNC: 9.8 MG/DL (ref 8.5–10.5)
CHLORIDE SERPL-SCNC: 102 MMOL/L (ref 100–109)
CHOLEST SERPL-MCNC: 202 MG/DL
CHOLEST/HDLC SERPL: 2.1 {RATIO}
CO2 SERPL-SCNC: 30 MMOL/L (ref 21–31)
CREAT SERPL-MCNC: 0.78 MG/DL (ref 0.53–1.3)
CYTOLOGY CMNT CVX/VAG CYTO-IMP: ABNORMAL
DIFFERENTIAL METHOD BLD: ABNORMAL
EOSINOPHIL # BLD AUTO: 0.1 THOU/CMM (ref 0–0.5)
EOSINOPHIL NFR BLD AUTO: 2 %
ERYTHROCYTE [DISTWIDTH] IN BLOOD BY AUTOMATED COUNT: 13.1 % (ref 12–16)
GFR/BSA.PRED SERPLBLD CYS-BASED-ARV: 121 ML/MIN/{1.73_M2}
GLUCOSE SERPL-MCNC: 100 MG/DL (ref 65–99)
HCT VFR BLD AUTO: 47.1 % (ref 37–48)
HCV AB SERPL QL IA: NONREACTIVE
HDLC SERPL-MCNC: 95 MG/DL (ref 23–92)
HGB BLD-MCNC: 16.6 G/DL (ref 12.5–17)
LDLC SERPL CALC-MCNC: 92 MG/DL
LYMPHOCYTES # BLD AUTO: 0.6 THOU/CMM (ref 1–3)
LYMPHOCYTES NFR BLD AUTO: 15 %
MCH RBC QN AUTO: 31.6 PG (ref 27–36)
MCHC RBC AUTO-ENTMCNC: 35.3 G/DL (ref 32–37)
MCV RBC AUTO: 90 FL (ref 80–100)
MONOCYTES # BLD AUTO: 0.4 THOU/CMM (ref 0.3–1)
MONOCYTES NFR BLD AUTO: 8 %
NEUTROPHILS # BLD AUTO: 3.1 THOU/CMM (ref 1.8–7.8)
NEUTROPHILS NFR BLD AUTO: 74 %
NONHDLC SERPL-MCNC: 107 MG/DL
PLATELET # BLD AUTO: 197 THOU/CMM (ref 140–350)
PMV BLD REES-ECKER: 7.9 FL (ref 7.5–11.3)
POTASSIUM SERPL-SCNC: 3.8 MMOL/L (ref 3.5–5.2)
PROT SERPL-MCNC: 7 G/DL (ref 6.3–8.3)
RBC # BLD AUTO: 5.25 MILL/CMM (ref 4–5.4)
SODIUM SERPL-SCNC: 142 MMOL/L (ref 135–145)
TRIGL SERPL-MCNC: 75 MG/DL
WBC # BLD AUTO: 4.2 THOU/CMM (ref 4–10.5)

## 2025-03-11 ENCOUNTER — RESULTS FOLLOW-UP (OUTPATIENT)
Dept: FAMILY MEDICINE CLINIC | Facility: CLINIC | Age: 35
End: 2025-03-11

## 2025-03-11 ENCOUNTER — OFFICE VISIT (OUTPATIENT)
Dept: FAMILY MEDICINE CLINIC | Facility: CLINIC | Age: 35
End: 2025-03-11
Payer: MEDICARE

## 2025-03-11 VITALS
BODY MASS INDEX: 28.6 KG/M2 | HEIGHT: 73 IN | DIASTOLIC BLOOD PRESSURE: 80 MMHG | HEART RATE: 68 BPM | OXYGEN SATURATION: 98 % | SYSTOLIC BLOOD PRESSURE: 130 MMHG | WEIGHT: 215.8 LBS

## 2025-03-11 DIAGNOSIS — F41.9 ANXIETY: ICD-10-CM

## 2025-03-11 DIAGNOSIS — G90.521 COMPLEX REGIONAL PAIN SYNDROME TYPE 1 OF RIGHT LOWER EXTREMITY: Primary | ICD-10-CM

## 2025-03-11 DIAGNOSIS — E78.5 DYSLIPIDEMIA: ICD-10-CM

## 2025-03-11 DIAGNOSIS — M51.17 INTERVERTEBRAL DISC DISORDER WITH RADICULOPATHY OF LUMBOSACRAL REGION: ICD-10-CM

## 2025-03-11 DIAGNOSIS — G47.00 INSOMNIA, UNSPECIFIED TYPE: ICD-10-CM

## 2025-03-11 PROCEDURE — G2211 COMPLEX E/M VISIT ADD ON: HCPCS | Performed by: FAMILY MEDICINE

## 2025-03-11 PROCEDURE — 99213 OFFICE O/P EST LOW 20 MIN: CPT | Performed by: FAMILY MEDICINE

## 2025-03-11 RX ORDER — AMOXICILLIN 500 MG/1
CAPSULE ORAL
COMMUNITY
Start: 2025-01-28 | End: 2025-03-11

## 2025-03-11 RX ORDER — ALPRAZOLAM 2 MG/1
TABLET ORAL
Qty: 150 TABLET | Refills: 5 | Status: SHIPPED | OUTPATIENT
Start: 2025-03-11

## 2025-03-11 NOTE — PROGRESS NOTES
Depression Screening and Follow-up Plan: Patient was screened for depression during today's encounter. They screened negative with a PHQ-2 score of 0.      Tobacco Cessation Counseling: Tobacco cessation counseling was provided. The patient is sincerely urged to quit consumption of tobacco. He is not ready to quit tobacco.     Assessment/Plan:    Return visit in 6 months     Problem List Items Addressed This Visit       Anxiety    Relevant Medications    ALPRAZolam (XANAX) 2 MG tablet    Complex regional pain syndrome type 1 of right lower extremity - Primary    Dyslipidemia    Insomnia    Intervertebral disc disorder with radiculopathy of lumbosacral region         Subjective:      Patient ID: Nigel Diallo is a 34 y.o. male.    Patient comes in for checkup.  He continues to be disabled by pain down his legs.  He takes no pain medication for this but takes Xanax to help him sleep at night and for anxiety related to this.  This is working well for him.        The following portions of the patient's history were reviewed and updated as appropriate:   Past Medical History:  He has a past medical history of Abnormal NCS (nerve conduction studies), Anxiety, Chronic pain disorder, and Intervertebral disc disorder with radiculopathy of lumbosacral region (4/24/2019).,  _______________________________________________________________________  Medical Problems:  does not have any pertinent problems on file.,  _______________________________________________________________________  Past Surgical History:   has a past surgical history that includes Arthroscopic repair ACL (Right); Rotator cuff repair (Right); Other surgical history; Knee arthroscopy (Right); pr prq impltj nstim electrode array epidural (Right, 07/12/2019); and Tonsillectomy (2010).,  _______________________________________________________________________  Family History:  family history includes No Known Problems in his father and  "mother.,  _______________________________________________________________________  Social History:   reports that he quit smoking about 6 years ago. His smoking use included cigarettes. He started smoking about 14 years ago. He uses smokeless tobacco. He reports that he does not currently use alcohol. He reports that he does not use drugs.,  _______________________________________________________________________  Allergies:  has no known allergies..  _______________________________________________________________________  Current Outpatient Medications   Medication Sig Dispense Refill    ALPRAZolam (XANAX) 2 MG tablet TAKE 2 TABLETS TWICE A DAY AND 1 TABLET AT BEDTIME 150 tablet 5     No current facility-administered medications for this visit.     _______________________________________________________________________  Review of Systems   Constitutional: Negative.    Respiratory: Negative.     Cardiovascular: Negative.          Objective:  Vitals:    03/11/25 1147   BP: 130/80   BP Location: Left arm   Patient Position: Sitting   Cuff Size: Standard   Pulse: 68   SpO2: 98%   Weight: 97.9 kg (215 lb 12.8 oz)   Height: 6' 1\" (1.854 m)     Body mass index is 28.47 kg/m².     Physical Exam  Constitutional:       Appearance: Normal appearance. He is well-developed.   HENT:      Head: Normocephalic and atraumatic.   Eyes:      Pupils: Pupils are equal, round, and reactive to light.   Cardiovascular:      Rate and Rhythm: Normal rate and regular rhythm.      Heart sounds: Normal heart sounds.   Pulmonary:      Effort: Pulmonary effort is normal.      Breath sounds: Normal breath sounds.   Abdominal:      General: Bowel sounds are normal.      Palpations: Abdomen is soft. There is no mass.      Tenderness: There is no abdominal tenderness.   Musculoskeletal:         General: Normal range of motion.      Cervical back: Neck supple.   Skin:     General: Skin is warm and dry.   Neurological:      Mental Status: He is alert. "   Psychiatric:         Mood and Affect: Mood normal.         Behavior: Behavior normal.

## (undated) DEVICE — PENCIL ELECTROSURG E-Z CLEAN -0035H

## (undated) DEVICE — INTENDED FOR TISSUE SEPARATION, AND OTHER PROCEDURES THAT REQUIRE A SHARP SURGICAL BLADE TO PUNCTURE OR CUT.: Brand: BARD-PARKER SAFETY BLADES SIZE 10, STERILE

## (undated) DEVICE — GLOVE SRG BIOGEL 6.5

## (undated) DEVICE — GLOVE INDICATOR PI UNDERGLOVE SZ 7.5 BLUE

## (undated) DEVICE — Device

## (undated) DEVICE — DRAPE C-ARM X-RAY

## (undated) DEVICE — ELECTRODE BLADE MOD E-Z CLEAN 2.5IN 6.4CM -0012M

## (undated) DEVICE — SPONGE PVP SCRUB WING STERILE

## (undated) DEVICE — UTILITY MARKER,BLACK WITH LABELS: Brand: DEVON

## (undated) DEVICE — BETHLEHEM UNIVERSAL MINOR GEN: Brand: CARDINAL HEALTH

## (undated) DEVICE — TIBURON SPLIT SHEET: Brand: CONVERTORS

## (undated) DEVICE — RX COUDÉ® EPIDURAL NEEDLE 14G TW X 4.0": Brand: EPIMED

## (undated) DEVICE — NEEDLE HYPO 22G X 1-1/2 IN

## (undated) DEVICE — PROGRAMMER PATIENT DRG PROCLAIM

## (undated) DEVICE — SUT MONOCRYL 4-0 PS-2 27 IN Y426H

## (undated) DEVICE — ADHESIVE SKN CLSR HISTOACRYL FLEX 0.5ML LF

## (undated) DEVICE — GLOVE SRG BIOGEL ECLIPSE 7

## (undated) DEVICE — SUT SILK 2-0 SH 30 IN K833H

## (undated) DEVICE — ANTIBACTERIAL VIOLET BRAIDED (POLYGLACTIN 910), SYNTHETIC ABSORBABLE SUTURE: Brand: COATED VICRYL

## (undated) DEVICE — 3M™ IOBAN™ 2 ANTIMICROBIAL INCISE DRAPE 6650EZ: Brand: IOBAN™ 2

## (undated) DEVICE — PREP SURGICAL PURPREP 26ML

## (undated) DEVICE — VIAL DECANTER

## (undated) DEVICE — GLOVE INDICATOR PI UNDERGLOVE SZ 6.5 BLUE

## (undated) DEVICE — DRAPE C-ARMOUR